# Patient Record
Sex: MALE | Race: ASIAN | NOT HISPANIC OR LATINO
[De-identification: names, ages, dates, MRNs, and addresses within clinical notes are randomized per-mention and may not be internally consistent; named-entity substitution may affect disease eponyms.]

---

## 2017-02-09 ENCOUNTER — RECORDS - HEALTHEAST (OUTPATIENT)
Dept: ADMINISTRATIVE | Facility: OTHER | Age: 22
End: 2017-02-09

## 2017-02-11 ENCOUNTER — RECORDS - HEALTHEAST (OUTPATIENT)
Dept: ADMINISTRATIVE | Facility: OTHER | Age: 22
End: 2017-02-11

## 2017-05-11 ENCOUNTER — RECORDS - HEALTHEAST (OUTPATIENT)
Dept: ADMINISTRATIVE | Facility: OTHER | Age: 22
End: 2017-05-11

## 2017-10-02 ENCOUNTER — RECORDS - HEALTHEAST (OUTPATIENT)
Dept: ADMINISTRATIVE | Facility: OTHER | Age: 22
End: 2017-10-02

## 2018-06-19 ENCOUNTER — OFFICE VISIT (OUTPATIENT)
Dept: FAMILY MEDICINE | Facility: CLINIC | Age: 23
End: 2018-06-19
Payer: COMMERCIAL

## 2018-06-19 VITALS
HEIGHT: 70 IN | OXYGEN SATURATION: 96 % | DIASTOLIC BLOOD PRESSURE: 84 MMHG | TEMPERATURE: 98.9 F | WEIGHT: 315 LBS | HEART RATE: 85 BPM | BODY MASS INDEX: 45.1 KG/M2 | SYSTOLIC BLOOD PRESSURE: 138 MMHG

## 2018-06-19 DIAGNOSIS — M10.072 ACUTE IDIOPATHIC GOUT OF LEFT ANKLE: ICD-10-CM

## 2018-06-19 DIAGNOSIS — Z11.3 SCREENING EXAMINATION FOR VENEREAL DISEASE: ICD-10-CM

## 2018-06-19 DIAGNOSIS — E66.01 MORBID OBESITY (H): ICD-10-CM

## 2018-06-19 DIAGNOSIS — M25.572 PAIN IN JOINT, ANKLE AND FOOT, LEFT: Primary | ICD-10-CM

## 2018-06-19 DIAGNOSIS — R79.82 ELEVATED C-REACTIVE PROTEIN (CRP): ICD-10-CM

## 2018-06-19 LAB
ERYTHROCYTE [SEDIMENTATION RATE] IN BLOOD BY WESTERGREN METHOD: 15 MM/H (ref 0–15)
URATE SERPL-MCNC: 10.9 MG/DL (ref 3.5–7.2)

## 2018-06-19 PROCEDURE — 86140 C-REACTIVE PROTEIN: CPT | Performed by: NURSE PRACTITIONER

## 2018-06-19 PROCEDURE — 36415 COLL VENOUS BLD VENIPUNCTURE: CPT | Performed by: NURSE PRACTITIONER

## 2018-06-19 PROCEDURE — 99214 OFFICE O/P EST MOD 30 MIN: CPT | Performed by: NURSE PRACTITIONER

## 2018-06-19 PROCEDURE — 87591 N.GONORRHOEAE DNA AMP PROB: CPT | Performed by: NURSE PRACTITIONER

## 2018-06-19 PROCEDURE — 87491 CHLMYD TRACH DNA AMP PROBE: CPT | Performed by: NURSE PRACTITIONER

## 2018-06-19 PROCEDURE — 86618 LYME DISEASE ANTIBODY: CPT | Performed by: NURSE PRACTITIONER

## 2018-06-19 PROCEDURE — 86038 ANTINUCLEAR ANTIBODIES: CPT | Performed by: NURSE PRACTITIONER

## 2018-06-19 PROCEDURE — 87389 HIV-1 AG W/HIV-1&-2 AB AG IA: CPT | Performed by: NURSE PRACTITIONER

## 2018-06-19 PROCEDURE — 85652 RBC SED RATE AUTOMATED: CPT | Performed by: NURSE PRACTITIONER

## 2018-06-19 PROCEDURE — 84550 ASSAY OF BLOOD/URIC ACID: CPT | Performed by: NURSE PRACTITIONER

## 2018-06-19 RX ORDER — HYDROCODONE BITARTRATE AND ACETAMINOPHEN 5; 325 MG/1; MG/1
1 TABLET ORAL 2 TIMES DAILY PRN
Qty: 20 TABLET | Refills: 0 | Status: SHIPPED | OUTPATIENT
Start: 2018-06-19 | End: 2021-10-01

## 2018-06-19 RX ORDER — DICLOFENAC SODIUM 75 MG/1
75 TABLET, DELAYED RELEASE ORAL 2 TIMES DAILY PRN
Qty: 30 TABLET | Refills: 1 | Status: SHIPPED | OUTPATIENT
Start: 2018-06-19 | End: 2021-10-01

## 2018-06-19 NOTE — LETTER
June 19, 2018      Saravanan Park  [address] [facility] [address]        To Whom It May Concern:    Saravanan Park  was seen on 06/19/18 due to injury.  Please excuse him  until 6/22/18 due to injury.    If you have questions or concerns please call the clinic.    Sincerely,        ESVIN Maynard, FNP-BC/mp

## 2018-06-19 NOTE — PATIENT INSTRUCTIONS
Call insurance and verify coverage of MRI, schedule as able.  Wear ankle brace when active, to prevent re-injury.  I will let you know your lab/ imaging results when I get them and can let you know plan of care if abnormalities are seen.  Follow below instructions.       Treating Ankle Sprains  Treatment will depend on how bad your sprain is. For a severe sprain, healing may take 3 months or more.  Right after your injury: Use R.I.C.E.    Rest: At first, keep weight off the ankle as much as you can. You may be given crutches to help you walk without putting weight on the ankle.    Ice: Put an ice pack on the ankle for 20 minutes. Remove the pack and wait at least 30 minutes. Repeat for up to 3 days. This helps reduce swelling.    Compression: To reduce swelling and keep the joint stable, you may need to wrap the ankle with an elastic bandage. For more severe sprains, you may need an ankle brace, a boot, or a cast.    Elevation: To reduce swelling, keep your ankle raised above your heart when you sit or lie down.  Medicine  Your healthcare provider may suggest oral nonsteroidal anti-inflammatory medicine (NSAIDs), such as ibuprofen. This relieves the pain and helps reduce swelling. Be sure to take your medicine as directed.  Exercises    After about 2 to 3 weeks, you may be given exercises to strengthen the ligaments and muscles in the ankle. Doing these exercises will help prevent another ankle sprain. Exercises may include standing on your toes and then on your heels and doing ankle curls.  Ankle curls    Sit on the edge of a sturdy table or lie on your back.    Pull your toes toward you. Then point them away from you. Repeat for 2 to 3 minutes.   Date Last Reviewed: 1/1/2018 2000-2017 [employer]. [address] [place] [address], [place] [address]. All rights reserved. This information is not intended as a substitute for professional medical care. Always follow your healthcare professional's  instructions.        Ankle Sprain (Adult)  An ankle sprain is a stretching or tearing of the ligaments that hold the ankle joint together. There are no broken bones.  An ankle sprain is a common injury for both children and adults. It happens when the ankle turns, twists, or rolls in an awkward way. This can be caused by a sports injury. Or it can happen from doing something as simple as stepping on an uneven surface.  Ligaments are made of tough connective tissue. Normally, ligaments stretch a certain amount and then go back to their normal place. A sprain happens when a ligament is forced to stretch more than the normal amount. A severe sprain can actually tear the ligaments. If you have a severe sprain, you may have felt or heard something like a pop when you were injured.  Ankle sprains are given a grade depending on whether they are mild, moderate, or severe:    Grade 1 sprain. A mild sprain with minor stretching and damage to the ligament.    Grade 2 sprain. A moderate sprain where the ligament is partly torn.    Grade 3 sprain. The most severe kind of sprain. The ligament is completely torn.  Most sprains take about 4 to 6 weeks to heal. A severe sprain can take several months to recover.  Your healthcare provider may order X-rays to be sure you don t have a fracture, or broken bone.  The injured area will feel sore.  Swelling and pain may make it hard to walk. You may need crutches if walking is painful. Or your provider may have you use a cast boot or air splint. This will depend on the grade of ankle sprain that you have.    Home care    For a Grade 1 sprain, use RICE (rest, ice, compression, and elevation):    Rest your ankle. Don t walk on it.    Ice should be used right away to help control swelling. Place an ice pack over the injured area for 20 minutes. Do this every 3 to 6 hours for the first 24 to 48 hours. Keep using ice packs to ease pain and swelling as needed. To make an ice pack, put ice cubes  in a plastic bag that seals at the top. Wrap the bag in a clean, thin towel or cloth. Never put ice or an ice pack directly on the skin. The ice pack can be put right on the cast, bandage, or splint. As the ice melts, be careful that the cast, bandage, or splint doesn t get wet. If you have a boot, open it to apply an ice pack, unless told otherwise by your provider.    Compression devices help to control swelling. They also keep the ankle from moving and support your injured ankle. These devices include dressings, bandages, and wraps.    Elevate or raise your ankle above the level of your heart when sitting or lying down. This is very important for the first 48 hours.    Follow the RICE guidelines for a Grade 2 sprain. This type of sprain will take longer to heal. Your provider may have you wear a splint, cast, or brace to keep your ankle from moving.     If you have a Grade 3 sprain, you are at risk for long-term ankle instability. In rare cases, surgery may be needed. Your provider may have you wear a short leg cast or a walking boot for 2 to 3 weeks.    After 48 hours, it may be helpful to apply heat for 20 minutes several times a day. You can do this with a heating pad or warm compress. Or you may want to go back and forth between using ice and heat. Never apply heat directly to the skin. Always wrap the heating pad or warm compress in a clean, thin towel or cloth.    You may use over-the-counter pain medicine (NSAIDS or nonsteroidal anti-inflammatory drugs) to control pain, unless another pain medicine was prescribed. Talk with your provider before using these medicines if you have chronic liver or kidney disease, or have ever had a stomach ulcer or GI (gastrointestinal) bleeding.    Follow any rehabilitation exercises your provider gives you. These can help you be more flexible and improve your balance and coordination. This is helpful in preventing long-term ankle problems.  Prevention  To help prevent ankle  sprains, it s important to have good strength, balance, and flexibility. Be sure to:    Always warm up before you exercise or do something very active    Be careful when walking or running on uneven or cracked surfaces    Wear shoes that are in good condition and fit well    Listen to your body s signals to slow down when you are in pain or tired  Follow-up care  Any X-rays you had today don t show any broken bones, breaks, or fractures. Sometimes fractures don t show up on the first X-ray. Bruises and sprains can sometimes hurt as much as a fracture. These injuries can take time to heal completely. If your symptoms don t get better or they get worse, talk with your healthcare provider. You may need a repeat X-ray.  Follow up with your healthcare provider, or as advised. Check for any warning signs listed below.  When to seek medical advice  Call your healthcare provider right away if any of these occur:    Fever of 100.4 F (38 C) or higher, or as directed by your healthcare provider    The injury doesn t seem to be healing    The swelling comes back    The cast has a bad smell    The plaster cast or splint gets wet or soft    The fiberglass cast or splint gets wet and does not dry for 24 hours    The pain or swelling increases, or redness appears    Your toes become cold, blue, numb, or tingly    The skin is discolored (looks blue, purple, or gray), has blisters, or is irritated    You re-injure your ankle  Date Last Reviewed: 11/20/2015 2000-2017 [employer]. [address] [place] [address], [place] [address]. All rights reserved. This information is not intended as a substitute for professional medical care. Always follow your healthcare professional's instructions.

## 2018-06-19 NOTE — NURSING NOTE
"Chief Complaint   Patient presents with     Establish Care     Pain       Initial BP (!) 148/100  Pulse 85  Temp 98.9  F (37.2  C) (Oral)  Ht 5' 10.47\" (1.79 m)  Wt (!) 355 lb 9.6 oz (161.3 kg)  SpO2 96%  BMI 50.34 kg/m2 Estimated body mass index is 50.34 kg/(m^2) as calculated from the following:    Height as of this encounter: 5' 10.47\" (1.79 m).    Weight as of this encounter: 355 lb 9.6 oz (161.3 kg).  Medication Reconciliation: complete     Lenore Le MA  "

## 2018-06-19 NOTE — MR AVS SNAPSHOT
After Visit Summary   6/19/2018    Saravanan Park    MRN: [id number]           Patient Information     Date Of Birth          1995        Visit Information        Provider Department      6/19/2018 2:00 PM Haleigh Nowak NP [facility]        Today's Diagnoses     Pain in joint, ankle and foot, left    -  1    Screening examination for venereal disease          Care Instructions    Call insurance and verify coverage of MRI, schedule as able.  Wear ankle brace when active, to prevent re-injury.  I will let you know your lab/ imaging results when I get them and can let you know plan of care if abnormalities are seen.  Follow below instructions.       Treating Ankle Sprains  Treatment will depend on how bad your sprain is. For a severe sprain, healing may take 3 months or more.  Right after your injury: Use R.I.C.E.    Rest: At first, keep weight off the ankle as much as you can. You may be given crutches to help you walk without putting weight on the ankle.    Ice: Put an ice pack on the ankle for 20 minutes. Remove the pack and wait at least 30 minutes. Repeat for up to 3 days. This helps reduce swelling.    Compression: To reduce swelling and keep the joint stable, you may need to wrap the ankle with an elastic bandage. For more severe sprains, you may need an ankle brace, a boot, or a cast.    Elevation: To reduce swelling, keep your ankle raised above your heart when you sit or lie down.  Medicine  Your healthcare provider may suggest oral nonsteroidal anti-inflammatory medicine (NSAIDs), such as ibuprofen. This relieves the pain and helps reduce swelling. Be sure to take your medicine as directed.  Exercises    After about 2 to 3 weeks, you may be given exercises to strengthen the ligaments and muscles in the ankle. Doing these exercises will help prevent another ankle sprain. Exercises may include standing on your toes and then on your heels and doing ankle curls.  Ankle curls    Sit  on the edge of a sturdy table or lie on your back.    Pull your toes toward you. Then point them away from you. Repeat for 2 to 3 minutes.   Date Last Reviewed: 1/1/2018 2000-2017 The "[employer] [address] [place] [address], [place] [address]. All rights reserved. This information is not intended as a substitute for professional medical care. Always follow your healthcare professional's instructions.        Ankle Sprain (Adult)  An ankle sprain is a stretching or tearing of the ligaments that hold the ankle joint together. There are no broken bones.  An ankle sprain is a common injury for both children and adults. It happens when the ankle turns, twists, or rolls in an awkward way. This can be caused by a sports injury. Or it can happen from doing something as simple as stepping on an uneven surface.  Ligaments are made of tough connective tissue. Normally, ligaments stretch a certain amount and then go back to their normal place. A sprain happens when a ligament is forced to stretch more than the normal amount. A severe sprain can actually tear the ligaments. If you have a severe sprain, you may have felt or heard something like a pop when you were injured.  Ankle sprains are given a grade depending on whether they are mild, moderate, or severe:    Grade 1 sprain. A mild sprain with minor stretching and damage to the ligament.    Grade 2 sprain. A moderate sprain where the ligament is partly torn.    Grade 3 sprain. The most severe kind of sprain. The ligament is completely torn.  Most sprains take about 4 to 6 weeks to heal. A severe sprain can take several months to recover.  Your healthcare provider may order X-rays to be sure you don t have a fracture, or broken bone.  The injured area will feel sore.  Swelling and pain may make it hard to walk. You may need crutches if walking is painful. Or your provider may have you use a cast boot or air splint. This will depend on the grade of ankle sprain that  you have.    Home care    For a Grade 1 sprain, use RICE (rest, ice, compression, and elevation):    Rest your ankle. Don t walk on it.    Ice should be used right away to help control swelling. Place an ice pack over the injured area for 20 minutes. Do this every 3 to 6 hours for the first 24 to 48 hours. Keep using ice packs to ease pain and swelling as needed. To make an ice pack, put ice cubes in a plastic bag that seals at the top. Wrap the bag in a clean, thin towel or cloth. Never put ice or an ice pack directly on the skin. The ice pack can be put right on the cast, bandage, or splint. As the ice melts, be careful that the cast, bandage, or splint doesn t get wet. If you have a boot, open it to apply an ice pack, unless told otherwise by your provider.    Compression devices help to control swelling. They also keep the ankle from moving and support your injured ankle. These devices include dressings, bandages, and wraps.    Elevate or raise your ankle above the level of your heart when sitting or lying down. This is very important for the first 48 hours.    Follow the RICE guidelines for a Grade 2 sprain. This type of sprain will take longer to heal. Your provider may have you wear a splint, cast, or brace to keep your ankle from moving.     If you have a Grade 3 sprain, you are at risk for long-term ankle instability. In rare cases, surgery may be needed. Your provider may have you wear a short leg cast or a walking boot for 2 to 3 weeks.    After 48 hours, it may be helpful to apply heat for 20 minutes several times a day. You can do this with a heating pad or warm compress. Or you may want to go back and forth between using ice and heat. Never apply heat directly to the skin. Always wrap the heating pad or warm compress in a clean, thin towel or cloth.    You may use over-the-counter pain medicine (NSAIDS or nonsteroidal anti-inflammatory drugs) to control pain, unless another pain medicine was  prescribed. Talk with your provider before using these medicines if you have chronic liver or kidney disease, or have ever had a stomach ulcer or GI (gastrointestinal) bleeding.    Follow any rehabilitation exercises your provider gives you. These can help you be more flexible and improve your balance and coordination. This is helpful in preventing long-term ankle problems.  Prevention  To help prevent ankle sprains, it s important to have good strength, balance, and flexibility. Be sure to:    Always warm up before you exercise or do something very active    Be careful when walking or running on uneven or cracked surfaces    Wear shoes that are in good condition and fit well    Listen to your body s signals to slow down when you are in pain or tired  Follow-up care  Any X-rays you had today don t show any broken bones, breaks, or fractures. Sometimes fractures don t show up on the first X-ray. Bruises and sprains can sometimes hurt as much as a fracture. These injuries can take time to heal completely. If your symptoms don t get better or they get worse, talk with your healthcare provider. You may need a repeat X-ray.  Follow up with your healthcare provider, or as advised. Check for any warning signs listed below.  When to seek medical advice  Call your healthcare provider right away if any of these occur:    Fever of 100.4 F (38 C) or higher, or as directed by your healthcare provider    The injury doesn t seem to be healing    The swelling comes back    The cast has a bad smell    The plaster cast or splint gets wet or soft    The fiberglass cast or splint gets wet and does not dry for 24 hours    The pain or swelling increases, or redness appears    Your toes become cold, blue, numb, or tingly    The skin is discolored (looks blue, purple, or gray), has blisters, or is irritated    You re-injure your ankle  Date Last Reviewed: 11/20/2015 2000-2017 [employer]. [address] [facility],  "[place] [address]. All rights reserved. This information is not intended as a substitute for professional medical care. Always follow your healthcare professional's instructions.                Follow-ups after your visit        Follow-up notes from your care team     Return if symptoms worsen or fail to improve.      Future tests that were ordered for you today     Open Future Orders        Priority Expected Expires Ordered    MR Ankle Left w/o Contrast Routine  6/19/2019 6/19/2018            Who to contact     Normal or non-critical lab and imaging results will be communicated to you by MyChart, letter or phone within 4 business days after the clinic has received the results. If you do not hear from us within 7 days, please contact the clinic through [employer] or phone. If you have a critical or abnormal lab result, we will notify you by phone as soon as possible.  Submit refill requests through [employer] or call your pharmacy and they will forward the refill request to us. Please allow 3 business days for your refill to be completed.          If you need to speak with a  for additional information , please call: [phone]             Additional Information About Your Visit        Care EveryWhere ID     This is your Care EveryWhere ID. This could be used by other organizations to access your [facility] medical records  [id number]        Your Vitals Were     Pulse Temperature Height Pulse Oximetry BMI (Body Mass Index)       85 98.9  F (37.2  C) (Oral) 5' 10.47\" (1.79 m) 96% 50.34 kg/m2        Blood Pressure from Last 3 Encounters:   06/19/18 (!) 148/100    Weight from Last 3 Encounters:   06/19/18 (!) 355 lb 9.6 oz (161.3 kg)              We Performed the Following     Anti Nuclear Christine IgG by IFA with Reflex     Chlamydia trachomatis PCR     CRP inflammation     Erythrocyte sedimentation rate auto     HIV Antigen Antibody Combo     Lyme Disease Christine with reflex to WB Serum     Neisseria gonorrhoeae PCR     " Uric acid          Today's Medication Changes          These changes are accurate as of 6/19/18  2:11 PM.  If you have any questions, ask your nurse or doctor.               Start taking these medicines.        Dose/Directions    diclofenac 75 MG EC tablet   Commonly known as:  VOLTAREN   Used for:  Pain in joint, ankle and foot, left   Started by:  Haleigh Nowak NP        Dose:  75 mg   Take 1 tablet (75 mg) by mouth 2 times daily as needed for moderate pain   Quantity:  30 tablet   Refills:  1       HYDROcodone-acetaminophen 5-325 MG per tablet   Commonly known as:  NORCO   Used for:  Pain in joint, ankle and foot, left   Started by:  Haleigh Nowak NP        Dose:  1 tablet   Take 1 tablet by mouth 2 times daily as needed for moderate to severe pain (use sparingly, can be habit forming.)   Quantity:  20 tablet   Refills:  0            Where to get your medicines      These medications were sent to [facility] - [address] [facility]  [address] [facility] [place] [address]     Phone:  [phone]     diclofenac 75 MG EC tablet         Some of these will need a paper prescription and others can be bought over the counter.  Ask your nurse if you have questions.     Bring a paper prescription for each of these medications     HYDROcodone-acetaminophen 5-325 MG per tablet               Information about OPIOIDS     PRESCRIPTION OPIOIDS: WHAT YOU NEED TO KNOW   We gave you an opioid (narcotic) pain medicine. It is important to manage your pain, but opioids are not always the best choice. You should first try all the other options your care team gave you. Take this medicine for as short a time (and as few doses) as possible.     These medicines have risks:    DO NOT drive when on new or higher doses of pain medicine. These medicines can affect your alertness and reaction times, and you could be arrested for driving under the influence (DUI). If you need to use opioids long-term, talk  to your care team about driving.    DO NOT operate heave machinery    DO NOT do any other dangerous activities while taking these medicines.     DO NOT drink any alcohol while taking these medicines.      If the opioid prescribed includes acetaminophen, DO NOT take with any other medicines that contain acetaminophen. Read all labels carefully. Look for the word  acetaminophen  or  Tylenol.  Ask your pharmacist if you have questions or are unsure.    You can get addicted to pain medicines, especially if you have a history of addiction (chemical, alcohol or substance dependence). Talk to your care team about ways to reduce this risk.    Store your pills in a secure place, locked if possible. We will not replace any lost or stolen medicine. If you don t finish your medicine, please throw away (dispose) as directed by your pharmacist. The [place] Pollution Control Agency has more information about safe disposal: https://www.pca.[facility].[place]/living-green/managing-unwanted-medications.     All opioids tend to cause constipation. Drink plenty of water and eat foods that have a lot of fiber, such as fruits, vegetables, prune juice, apple juice and high-fiber cereal. Take a laxative (Miralax, milk of magnesia, Colace, Senna) if you don t move your bowels at least every other day.          Primary Care Provider Office Phone # Fax #    Haleigh Nowak [phone] [address] [facility] [address]        Equal Access to Services     ZULLY CURRAN : Hadii dalia turner hadasho Soomaali, waaxda luqadaha, qaybta kaalmada deep, kash bobby. So [facility] [phone].    ATENCIÓN: Si habla español, tiene a quesada disposición servicios gratuitos de asistencia lingüística. Eunice al [phone].    We comply with applicable federal civil rights laws and [place] laws. We do not discriminate on the basis of race, color, national origin, age, disability, sex, sexual orientation, or gender  identity.            Thank you!     Thank you for choosing [facility]  for your care. Our goal is always to provide you with excellent care. Hearing back from our patients is one way we can continue to improve our services. Please take a few minutes to complete the written survey that you may receive in the mail after your visit with us. Thank you!             Your Updated Medication List - Protect others around you: Learn how to safely use, store and throw away your medicines at www.disposemymeds.org.          This list is accurate as of 6/19/18  2:11 PM.  Always use your most recent med list.                   Brand Name Dispense Instructions for use Diagnosis    diclofenac 75 MG EC tablet    VOLTAREN    30 tablet    Take 1 tablet (75 mg) by mouth 2 times daily as needed for moderate pain    Pain in joint, ankle and foot, left       HYDROcodone-acetaminophen 5-325 MG per tablet    NORCO    20 tablet    Take 1 tablet by mouth 2 times daily as needed for moderate to severe pain (use sparingly, can be habit forming.)    Pain in joint, ankle and foot, left

## 2018-06-19 NOTE — LETTER
June 22, 2018      Saravanan Park  [address] [address]        Dear ,    Your uric acid level is elevated, if still having pain, I am sending in a course of prednisone. Since CRP inflammation is also high, I am placing a referral for rheumatology for further evaluation.        Resulted Orders   Chlamydia trachomatis PCR   Result Value Ref Range    Specimen Description Urine     Chlamydia Trachomatis PCR Negative NEG^Negative      Comment:      Negative for C. trachomatis rRNA by transcription mediated amplification.  A negative result by transcription mediated amplification does not preclude   the presence of C. trachomatis infection because results are dependent on   proper and adequate collection, absence of inhibitors, and sufficient rRNA to   be detected.     Neisseria gonorrhoeae PCR   Result Value Ref Range    Specimen Descrip Urine     N Gonorrhea PCR Negative NEG^Negative      Comment:      Negative for N. gonorrhoeae rRNA by transcription mediated amplification.  A negative result by transcription mediated amplification does not preclude   the presence of N. gonorrhoeae infection because results are dependent on   proper and adequate collection, absence of inhibitors, and sufficient rRNA to   be detected.     HIV Antigen Antibody Combo   Result Value Ref Range    HIV Antigen Antibody Combo Nonreactive NR^Nonreactive          Comment:      HIV-1 p24 Ag & HIV-1/HIV-2 Ab Not Detected   Uric acid   Result Value Ref Range    Uric Acid 10.9 (H) 3.5 - 7.2 mg/dL   Anti Nuclear Christine IgG by IFA with Reflex   Result Value Ref Range    SERGIO interpretation Negative NEG^Negative      Comment:                                         Reference range:  <1:40  NEGATIVE  1:40 - 1:80  BORDERLINE POSITIVE  >1:80 POSITIVE     Erythrocyte sedimentation rate auto   Result Value Ref Range    Sed Rate 15 0 - 15 mm/h   Lyme Disease Christine with reflex to WB Serum   Result Value Ref Range    Lyme Disease Antibodies  Serum 0.09 0.00 - 0.89      Comment:      Negative, Absence of detectable Borrelia burdorferi antibodies. A negative   result does not exclude the possibility of Borrelia burgdorferi infection. If   early Lyme disease is suspected, a second sample should be collected and   tested 2 to 4 weeks later.     CRP inflammation   Result Value Ref Range    CRP Inflammation 22.7 (H) 0.0 - 8.0 mg/L       If you have any questions or concerns, please call the clinic at the number listed above.       Sincerely,    Haleigh Nowak NP/edward

## 2018-06-19 NOTE — PROGRESS NOTES
SUBJECTIVE:   Saravanan Park is a 23 year old male who presents to clinic today for the following health issues:      New Patient/Transfer of Care    Muscle/Joint Pain     Onset: yesterday    Description:   Location: left ankle  Character: Sharp    Progression of Symptoms: worse; has injurted his ankle several times in the past.  Had xray at [facility] recently- no injury seen. Would like MRI. Pain 7/10 at rest, 8/10 with walking.     Accompanying Signs & Symptoms:  Other symptoms: weakness of ankle and swelling    Precipitating factors:   Trauma or overuse: YES- played sports last night    Alleviating factors:  Improved by: nothing  Therapies Tried and outcome: ibu       Problem list and histories reviewed & adjusted, as indicated.  Additional history: as documented    There is no problem list on file for this patient.    History reviewed. No pertinent surgical history.    Social History   Substance Use Topics     Smoking status: Never Smoker     Smokeless tobacco: Never Used     Alcohol use Yes      Comment: occ     Family History   Problem Relation Age of Onset     Diabetes Mother      Hypertension Father          Current Outpatient Prescriptions   Medication Sig Dispense Refill     diclofenac (VOLTAREN) 75 MG EC tablet Take 1 tablet (75 mg) by mouth 2 times daily as needed for moderate pain 30 tablet 1     HYDROcodone-acetaminophen (NORCO) 5-325 MG per tablet Take 1 tablet by mouth 2 times daily as needed for moderate to severe pain (use sparingly, can be habit forming.) 20 tablet 0     No Known Allergies  BP Readings from Last 3 Encounters:   06/19/18 138/84    Wt Readings from Last 3 Encounters:   06/19/18 (!) 355 lb 9.6 oz (161.3 kg)                  Labs reviewed in EPIC    Reviewed and updated as needed this visit by clinical staff  Tobacco  Allergies  Meds  Med Hx  Surg Hx  Fam Hx  Soc Hx      Reviewed and updated as needed this visit by Provider         ROS:  Constitutional, HEENT,  "cardiovascular, pulmonary, GI, , musculoskeletal, neuro, skin, endocrine and psych systems are negative, except as otherwise noted.    OBJECTIVE:     /84  Pulse 85  Temp 98.9  F (37.2  C) (Oral)  Ht 5' 10.47\" (1.79 m)  Wt (!) 355 lb 9.6 oz (161.3 kg)  SpO2 96%  BMI 50.34 kg/m2  Body mass index is 50.34 kg/(m^2).  GENERAL: healthy, alert and no distress  RESP: lungs clear to auscultation - no rales, rhonchi or wheezes  CV: regular rate and rhythm, normal S1 S2, no S3 or S4, no murmur, click or rub, no peripheral edema and peripheral pulses strong  MS: no gross musculoskeletal defects noted POSITIVE for left ankle edema, pain with palpation of top of foot/ bilateral sides of ankle.  ROM significantly decreased in all directions due to swelling and pain.   SKIN: no suspicious discoloration  PSYCH: mentation appears normal, affect normal/bright    Diagnostic Test Results:  See orders    ASSESSMENT/PLAN:           ICD-10-CM    1. Pain in joint, ankle and foot, left M25.572 MR Ankle Left w/o Contrast     Uric acid     Anti Nuclear Christine IgG by IFA with Reflex     Erythrocyte sedimentation rate auto     Lyme Disease Christine with reflex to WB Serum     CRP inflammation     diclofenac (VOLTAREN) 75 MG EC tablet     HYDROcodone-acetaminophen (NORCO) 5-325 MG per tablet    recurrent   2. Screening examination for venereal disease Z11.3 Chlamydia trachomatis PCR     Neisseria gonorrhoeae PCR     HIV Antigen Antibody Combo       See Patient Instructions: [place]  checked. Call insurance and verify coverage of MRI, schedule as able.  Wear ankle brace when active, to prevent re-injury.  I will let you know your lab/ imaging results when I get them and can let you know plan of care if abnormalities are seen.  Follow below instructions.     Haleigh Nowak, SAMANTHA  [facility] JONNY  "

## 2018-06-20 LAB
ANA SER QL IF: NEGATIVE
B BURGDOR IGG+IGM SER QL: 0.09 (ref 0–0.89)
C TRACH DNA SPEC QL NAA+PROBE: NEGATIVE
CRP SERPL-MCNC: 22.7 MG/L (ref 0–8)
HIV 1+2 AB+HIV1 P24 AG SERPL QL IA: NONREACTIVE
N GONORRHOEA DNA SPEC QL NAA+PROBE: NEGATIVE
SPECIMEN SOURCE: NORMAL
SPECIMEN SOURCE: NORMAL

## 2018-06-21 ENCOUNTER — TELEPHONE (OUTPATIENT)
Dept: FAMILY MEDICINE | Facility: CLINIC | Age: 23
End: 2018-06-21

## 2018-06-21 DIAGNOSIS — M25.572 PAIN IN JOINT INVOLVING ANKLE AND FOOT, LEFT: Primary | ICD-10-CM

## 2018-06-21 PROBLEM — E66.01 MORBID OBESITY (H): Status: ACTIVE | Noted: 2018-06-21

## 2018-06-21 RX ORDER — PREDNISONE 20 MG/1
TABLET ORAL
Qty: 20 TABLET | Refills: 0 | Status: SHIPPED | OUTPATIENT
Start: 2018-06-21 | End: 2021-10-01

## 2018-06-21 NOTE — TELEPHONE ENCOUNTER
Patient would like an order placed for [facility]-he thinks he can get in there sooner than [facility]. He wants to get in this week still.

## 2018-06-21 NOTE — PROGRESS NOTES
Please call pt, uric acid level is elevated, if still having pain, I am sending in a course of prednisone. Since CRP inflammation is also high, I am placing a referral for rheumatology for further evaluation.    SNEHAL Lynn

## 2018-12-11 ENCOUNTER — OFFICE VISIT (OUTPATIENT)
Dept: ORTHOPEDICS | Facility: CLINIC | Age: 23
End: 2018-12-11
Payer: COMMERCIAL

## 2018-12-11 ENCOUNTER — ANCILLARY PROCEDURE (OUTPATIENT)
Dept: GENERAL RADIOLOGY | Facility: CLINIC | Age: 23
End: 2018-12-11
Attending: PEDIATRICS
Payer: COMMERCIAL

## 2018-12-11 VITALS
DIASTOLIC BLOOD PRESSURE: 88 MMHG | HEIGHT: 71 IN | BODY MASS INDEX: 44.1 KG/M2 | WEIGHT: 315 LBS | SYSTOLIC BLOOD PRESSURE: 136 MMHG

## 2018-12-11 DIAGNOSIS — M25.571 RIGHT ANKLE PAIN: ICD-10-CM

## 2018-12-11 DIAGNOSIS — M25.571 RIGHT ANKLE PAIN, UNSPECIFIED CHRONICITY: Primary | ICD-10-CM

## 2018-12-11 PROCEDURE — 99213 OFFICE O/P EST LOW 20 MIN: CPT | Performed by: PEDIATRICS

## 2018-12-11 PROCEDURE — 73610 X-RAY EXAM OF ANKLE: CPT | Mod: RT

## 2018-12-11 ASSESSMENT — MIFFLIN-ST. JEOR: SCORE: 2614.46

## 2018-12-11 NOTE — PATIENT INSTRUCTIONS
Ice and elevate    Activity Modification: as discussed, avoid painful activities     Brace/compress for soreness.     Physical Therapy referral placed    Letter written for work    Take Diclofenac (Voltaren) OR ibuprofen (Advil), not both together. You can take acetaminophen (Tylenol) with either    Prescription dosages are as follow:  -Acetaminophen (Tylenol) 1000mg every 6 hours with food (Maximum of 3000mg/day)  -Ibuprofen (Advil) maximum of 800mg three times a day with food

## 2018-12-11 NOTE — LETTER
12/11/2018         RE: Saravanan Park  [address]  [place] [address]        Dear Colleague,    Thank you for referring your patient, Saravanan Park, to the [facility] [place]. Please see a copy of my visit note below.    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  Patient states he has sprained his ankle a few times, most recently over the summer.  No recent injury, but states it has never been the same.  Pain over the lateral aspect of his ankle.  Does have pain in his Achilles as well.   Has had an increase in pain over the past few days   Currently ambulating with one crutch.  Has a wrap around ankle brace     **  Right anterolateral ankle pain. Denies any injuries or exacerbating factors that could cause pain. Helped by ibuprofen. No pain currently with recent ibuprofen dosage. Positive for swelling. Positive for reduced ROM. Positive for antalgic gait. Positive for calf wil. Denies popping, cracking, swelling.        Injury: HX of inversion ankle sprains     Location of Pain: right lateral ankle   Duration of Pain: 3 day(s) of increasing pain  Rating of Pain at worst: 8/10  Rating of Pain Currently: 4/10  Symptoms are better with: Ibuprofen  Symptoms are worse with: weight bearing   Additional Features:   Positive: swelling and instability   Negative: bruising, popping, grinding, catching, locking, paresthesias, numbness, weakness, pain in other joints and systemic symptoms  Other evaluation and/or treatments so far consists of: Nothing  Prior History of related problems: repeated ankle sprains     Social History:      Review of Systems  Musculoskeletal: as above  Remainder of review of systems is negative including constitutional, CV, pulmonary, GI, Skin and Neurologic except as noted in HPI or medical history.    This document serves as a record  "of the services and decisions personally performed and made by DO SHENA Valdez. It was created on their behalf by Pk Reed, a trained medical scribe. The creation of this document is based the provider's statements to the medical scribe.    Pk Reed December 11, 2018 2:33 PM     Patient Active Problem List   Diagnosis     Morbid obesity (H)     PMHx: above  PSHx: noncontributory    Family History   Problem Relation Age of Onset     Diabetes Mother      Hypertension Father      Social History     Socioeconomic History     Marital status: Single     Spouse name: Not on file     Number of children: Not on file     Years of education: Not on file     Highest education level: Not on file   Social Needs     Financial resource strain: Not on file     Food insecurity - worry: Not on file     Food insecurity - inability: Not on file     Transportation needs - medical: Not on file     Transportation needs - non-medical: Not on file   Occupational History     Not on file   Tobacco Use     Smoking status: Never Smoker     Smokeless tobacco: Never Used   Substance and Sexual Activity     Alcohol use: Yes     Comment: occ     Drug use: No     Sexual activity: Yes     Partners: Female     Birth control/protection: Condom   Other Topics Concern     Parent/sibling w/ CABG, MI or angioplasty before 65F 55M? Not Asked   Social History Narrative     Not on file       Objective  /88   Ht 1.791 m (5' 10.5\")   Wt (!) 161 kg (355 lb)   BMI 50.22 kg/m       GENERAL APPEARANCE: healthy, alert, no distress and obese   GAIT: antalgic and crutches  SKIN: no suspicious lesions or rashes  NEURO: Normal strength and tone, mentation intact and speech normal  PSYCH:  mentation appears normal and affect normal/bright  HEENT: no scleral icterus  CV: no extremity edema  RESP: nonlabored breathing     Right Ankle Exam:    Inspection:       no visible ecchymosis       Diffuse mild swelling lateral ankle, into forefoot    Foot " inspection:       no deformity noted    ROM:        Dorsiflexion limited with achilles pain, mild limitation       Plantarflexion limited with achilles pain and lateral pain, mild limitation       eversion mild limitation with anterolateral ankle pain       inversion moderately limited with anterolateral ankle pain.        digit motion grossly full       Tender:       Inferior to medial malleolus        Lateral ligaments       5th metatarsal    Non-Tender:       remainder of foot and ankle    Skin:       well perfused       capillary refill brisk    Strength:       Able to resist with Dorsiflexion, Plantarflexion, eversion, and inversion  Some lateral pain with resisted PF and DF    Special Tests:       neg (-) anterior drawer        positive (+) talar tilt for lateral pain. No laxity felt    Gait:       antalgic gait       wearing ankle brace       using assitive device    Sensation:  Reduced on medial midfoot and forefoot to light touch, though still able to feel      Radiology:  Visualized radiographs of right foot taken on 12/11/2018, and reviewed the images with the patient.  Impression: no acute abnormality. Posterior calcaneal spur developing. Also with question posterior subtalar joint mild degenerative change. No fracture.      Assessment:  1. Right ankle pain, unspecified chronicity        Plan:  Discussed the assessment with the patient. Primarily lateral pain, with past sprains. Also obese.  Radiologic images reviewed and discussed with patient today   We discussed the following: symptom treatment, activity modification/rest, imaging, rehab and support for the affected area.     Following discussion, plan:   Topical Treatments: Ice or Heat as needed ; icing preferred for swelling, soreness  Over the counter medication: Patient's preferred OTC medication as needed. Has NSAIDs.  Has diclofenac as well. No additional NSAIDs with diclofenac (has rx from this past summer).  Activity Modification: as  discussed  Rehab: Physical Therapy: Referral placed. Plan PT to work on ankle issues.  May continue with compression and bracing. Has options available to him already, currently using compression.   Ok to use crutch(es) prn. Has single crutch today.  Letter written regarding work restrictions. He reports being off yesterday, is off today. Desires to be off for remainder of week. 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  Future consideration for MRI of the right ankle pending above course. No additional imaging required currently. He had discussed imaging previously at prior appointment.  Follow up: if symptoms worsen or fail to improve  Questions answered. The patient indicates understanding of these issues and agrees with the plan.     Messi Calabrese DO, SHENA          Disclaimer: This note consists of symbols derived from keyboarding, dictation and/or voice recognition software. As a result, there may be errors in the script that have gone undetected. Please consider this when interpreting information found in this chart.     The information in this document, created by the medical scribe for me, accurately reflects the services I personally performed and the decisions made by me. I have reviewed and approved this document for accuracy prior to leaving the patient care area.         Again, thank you for allowing me to participate in the care of your patient.        Sincerely,        Messi Calabrese DO

## 2018-12-11 NOTE — PROGRESS NOTES
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  Patient states he has sprained his ankle a few times, most recently over the summer.  No recent injury, but states it has never been the same.  Pain over the lateral aspect of his ankle.  Does have pain in his Achilles as well.   Has had an increase in pain over the past few days   Currently ambulating with one crutch.  Has a wrap around ankle brace     **  Right anterolateral ankle pain. Denies any injuries or exacerbating factors that could cause pain. Helped by ibuprofen. No pain currently with recent ibuprofen dosage. Positive for swelling. Positive for reduced ROM. Positive for antalgic gait. Positive for calf wil. Denies popping, cracking, swelling.        Injury: HX of inversion ankle sprains     Location of Pain: right lateral ankle   Duration of Pain: 3 day(s) of increasing pain  Rating of Pain at worst: 8/10  Rating of Pain Currently: 4/10  Symptoms are better with: Ibuprofen  Symptoms are worse with: weight bearing   Additional Features:   Positive: swelling and instability   Negative: bruising, popping, grinding, catching, locking, paresthesias, numbness, weakness, pain in other joints and systemic symptoms  Other evaluation and/or treatments so far consists of: Nothing  Prior History of related problems: repeated ankle sprains     Social History:      Review of Systems  Musculoskeletal: as above  Remainder of review of systems is negative including constitutional, CV, pulmonary, GI, Skin and Neurologic except as noted in HPI or medical history.    This document serves as a record of the services and decisions personally performed and made by DO SHENA Valdez. It was created on their behalf by Pk Reed, a trained medical scribe. The creation of this document is based the provider's statements to the medical  "radha Whittington Ashleyginny December 11, 2018 2:33 PM     Patient Active Problem List   Diagnosis     Morbid obesity (H)     PMHx: above  PSHx: noncontributory    Family History   Problem Relation Age of Onset     Diabetes Mother      Hypertension Father      Social History     Socioeconomic History     Marital status: Single     Spouse name: Not on file     Number of children: Not on file     Years of education: Not on file     Highest education level: Not on file   Social Needs     Financial resource strain: Not on file     Food insecurity - worry: Not on file     Food insecurity - inability: Not on file     Transportation needs - medical: Not on file     Transportation needs - non-medical: Not on file   Occupational History     Not on file   Tobacco Use     Smoking status: Never Smoker     Smokeless tobacco: Never Used   Substance and Sexual Activity     Alcohol use: Yes     Comment: occ     Drug use: No     Sexual activity: Yes     Partners: Female     Birth control/protection: Condom   Other Topics Concern     Parent/sibling w/ CABG, MI or angioplasty before 65F 55M? Not Asked   Social History Narrative     Not on file       Objective  /88   Ht 1.791 m (5' 10.5\")   Wt (!) 161 kg (355 lb)   BMI 50.22 kg/m      GENERAL APPEARANCE: healthy, alert, no distress and obese   GAIT: antalgic and crutches  SKIN: no suspicious lesions or rashes  NEURO: Normal strength and tone, mentation intact and speech normal  PSYCH:  mentation appears normal and affect normal/bright  HEENT: no scleral icterus  CV: no extremity edema  RESP: nonlabored breathing     Right Ankle Exam:    Inspection:       no visible ecchymosis       Diffuse mild swelling lateral ankle, into forefoot    Foot inspection:       no deformity noted    ROM:        Dorsiflexion limited with achilles pain, mild limitation       Plantarflexion limited with achilles pain and lateral pain, mild limitation       eversion mild limitation with anterolateral ankle " pain       inversion moderately limited with anterolateral ankle pain.        digit motion grossly full       Tender:       Inferior to medial malleolus        Lateral ligaments       5th metatarsal    Non-Tender:       remainder of foot and ankle    Skin:       well perfused       capillary refill brisk    Strength:       Able to resist with Dorsiflexion, Plantarflexion, eversion, and inversion  Some lateral pain with resisted PF and DF    Special Tests:       neg (-) anterior drawer        positive (+) talar tilt for lateral pain. No laxity felt    Gait:       antalgic gait       wearing ankle brace       using assitive device    Sensation:  Reduced on medial midfoot and forefoot to light touch, though still able to feel      Radiology:  Visualized radiographs of right foot taken on 12/11/2018, and reviewed the images with the patient.  Impression: no acute abnormality. Posterior calcaneal spur developing. Also with question posterior subtalar joint mild degenerative change. No fracture.      Assessment:  1. Right ankle pain, unspecified chronicity        Plan:  Discussed the assessment with the patient. Primarily lateral pain, with past sprains. Also obese.  Radiologic images reviewed and discussed with patient today   We discussed the following: symptom treatment, activity modification/rest, imaging, rehab and support for the affected area.     Following discussion, plan:   Topical Treatments: Ice or Heat as needed ; icing preferred for swelling, soreness  Over the counter medication: Patient's preferred OTC medication as needed. Has NSAIDs.  Has diclofenac as well. No additional NSAIDs with diclofenac (has rx from this past summer).  Activity Modification: as discussed  Rehab: Physical Therapy: Referral placed. Plan PT to work on ankle issues.  May continue with compression and bracing. Has options available to him already, currently using compression.   Ok to use crutch(es) prn. Has single crutch  today.  Letter written regarding work restrictions. He reports being off yesterday, is off today. Desires to be off for remainder of week. 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  Future consideration for MRI of the right ankle pending above course. No additional imaging required currently. He had discussed imaging previously at prior appointment.  Follow up: if symptoms worsen or fail to improve  Questions answered. The patient indicates understanding of these issues and agrees with the plan.     Messi Calabrese, DO, CAQ          Disclaimer: This note consists of symbols derived from keyboarding, dictation and/or voice recognition software. As a result, there may be errors in the script that have gone undetected. Please consider this when interpreting information found in this chart.     The information in this document, created by the medical scribe for me, accurately reflects the services I personally performed and the decisions made by me. I have reviewed and approved this document for accuracy prior to leaving the patient care area.

## 2018-12-11 NOTE — LETTER
[facility] [place]  [address]  [place] [address]  Phone: [phone]  Fax: [phone]      December 11, 2018      RE: Saravanan Park  [address]  [place] [address]        To whom it may concern:    Saravanan Park was seen in clinic today for a musculoskeletal issue. He reports being absent from work yesterday (12/10/18) due to this same issue. He will remain off work through Friday, 12/14/18.      Sincerely,            Messi ESCOTO

## 2019-07-30 ENCOUNTER — OFFICE VISIT (OUTPATIENT)
Dept: ORTHOPEDICS | Facility: CLINIC | Age: 24
End: 2019-07-30
Payer: COMMERCIAL

## 2019-07-30 VITALS
HEIGHT: 71 IN | WEIGHT: 315 LBS | DIASTOLIC BLOOD PRESSURE: 84 MMHG | SYSTOLIC BLOOD PRESSURE: 118 MMHG | BODY MASS INDEX: 44.1 KG/M2

## 2019-07-30 DIAGNOSIS — M25.571 CHRONIC PAIN OF RIGHT ANKLE: Primary | ICD-10-CM

## 2019-07-30 DIAGNOSIS — G89.29 CHRONIC PAIN OF RIGHT ANKLE: Primary | ICD-10-CM

## 2019-07-30 PROCEDURE — 99214 OFFICE O/P EST MOD 30 MIN: CPT | Performed by: FAMILY MEDICINE

## 2019-07-30 ASSESSMENT — MIFFLIN-ST. JEOR: SCORE: 2684.3

## 2019-07-30 NOTE — PROGRESS NOTES
ASSESSMENT & PLAN  Saravanan was seen today for pain.    Diagnoses and all orders for this visit:    Chronic pain of right ankle  -     MR Ankle Right w/o Contrast; Future      Patient is a 24 year old male presenting for evaluation of   Chief Complaint   Patient presents with     Right Ankle - Pain     Chronic Right Ankle Pain: Notable over the medial ankle with pos too many toes sign.  Previous XR on 12/18 negative.  US showing significant fluid over posterior tibialis tendon concern for PTTI vs tear.  Given chronicity and sig pain plan to order MRI and f/u afterwards  Treatment: Ankle MRI,  Ankle brace, work letter written excusing 2 days  Physical Therapy none  Injection none  Medications  Limited NSAIDs/Tylenol    Concerning signs/sx that would warrant urgent evaluation were discussed.  All questions were answered, patient understands and agrees with plan.      Return in about 1 week (around 8/6/2019).      -----    SUBJECTIVE  Saravanan Park is a/an 24 year old male who is seen as a self referral AIC for evaluation of right ankle pain. The patient is seen by themselves.    Onset: 2 month(s) ago. Reports insidious onset without acute precipitating event. Works in medical assembly   Location of Pain: right ankle-diffuse   Rating of Pain at worst: 10/10  Rating of Pain Currently: 6/10  Worsened by: walking   Better with: rest, sitting   Treatments tried: ibuprofen, brace   Associated symptoms: swelling  Orthopedic history: YES -saw Dr. Calabrese 12/11/18, self limiting pain for years   Relevant surgical history: NO  History reviewed. No pertinent past medical history.  Social History     Socioeconomic History     Marital status: Single     Spouse name: None     Number of children: None     Years of education: None     Highest education level: None   Occupational History     None   Social Needs     Financial resource strain: None     Food insecurity:     Worry: None     Inability: None     Transportation needs:      "Medical: None     Non-medical: None   Tobacco Use     Smoking status: Never Smoker     Smokeless tobacco: Never Used   Substance and Sexual Activity     Alcohol use: Yes     Comment: occ     Drug use: No     Sexual activity: Yes     Partners: Female     Birth control/protection: Condom   Lifestyle     Physical activity:     Days per week: None     Minutes per session: None     Stress: None   Relationships     Social connections:     Talks on phone: None     Gets together: None     Attends Roman Catholic service: None     Active member of club or organization: None     Attends meetings of clubs or organizations: None     Relationship status: None     Intimate partner violence:     Fear of current or ex partner: None     Emotionally abused: None     Physically abused: None     Forced sexual activity: None   Other Topics Concern     Parent/sibling w/ CABG, MI or angioplasty before 65F 55M? Not Asked   Social History Narrative     None         Patient's past medical, surgical, social, and family histories were reviewed today and no changes are noted.    REVIEW OF SYSTEMS:  10 point ROS is negative other than symptoms noted above in HPI, Past Medical History or as stated below  Constitutional: NEGATIVE for fever, chills, change in weight  Skin: NEGATIVE for worrisome rashes, moles or lesions  GI/: NEGATIVE for bowel or bladder changes  Neuro: NEGATIVE for weakness, dizziness or paresthesias    OBJECTIVE:  /84   Ht 1.791 m (5' 10.5\")   Wt (!) 168.5 kg (371 lb 8 oz)   BMI 52.55 kg/m     General: healthy, alert and in no distress  HEENT: no scleral icterus or conjunctival erythema  Skin: no suspicious lesions or rash. No jaundice.  CV:  no pedal edema  Resp: normal respiratory effort without conversational dyspnea   Psych: normal mood and affect  Gait: normal steady gait with appropriate coordination and balance  Neuro: Normal light sensory exam of lower extremity  MSK:  RIGHT ANKLE  Inspection:  Mild medial ankle " swelling, too many toes sign  Palpation:    Tender about the posterior tibial tendon. Remainder of bony and ligamentous landmarks are nontender.  Range of Motion:     Plantarflexion limited slightly by pain / dorsiflexion full / inversion full / eversion full  Strength:    limited slightly by pain with plantar flexion, otherwise 5/5  Special Tests:    negative anterior drawer, negative talar tilt, negative valgus stress, negative forced external rotation/eversion, negative Boo sign, negative squeeze test. Unable to perform heel raise and Unable to hop.    RIGHT FOOT  Inspection:    no swelling or ecchymosis  Palpation:  Non-tender.  Range of Motion:     Grossly intact and non-painful  Strength:    Grossly intact in all planes  Special Tests:    Positive: None    Negative: anterior drawer, heel strike and calcaneal squeeze    Independent visualization of the below image:  No results found for this or any previous visit (from the past 24 hour(s)).  RIGHT ANKLE THREE OR MORE VIEWS   12/11/2018 2:21 PM      HISTORY: Right ankle pain.                                                                    IMPRESSION: Unremarkable exam.     TRIXIE ADKINS MD    Patient's conditions were thoroughly discussed during today's visit with greater than 50% of the visit spent counseling the patient with total time spent face-to-face with the patient being 30 minutes.    Mukul Castañeda MD [facility]

## 2019-07-30 NOTE — LETTER
7/30/2019         RE: Saravanan Park  [address]  [place] [address]        Dear Colleague,    Thank you for referring your patient, Saravanan Park, to the [facility] [place]. Please see a copy of my visit note below.    ASSESSMENT & PLAN  Saravanan was seen today for pain.    Diagnoses and all orders for this visit:    Chronic pain of right ankle  -     MR Ankle Right w/o Contrast; Future      Patient is a 24 year old male presenting for evaluation of   Chief Complaint   Patient presents with     Right Ankle - Pain     Chronic Right Ankle Pain: Notable over the medial ankle with pos too many toes sign.  Previous XR on 12/18 negative.  US showing significant fluid over posterior tibialis tendon concern for PTTI vs tear.  Given chronicity and sig pain plan to order MRI and f/u afterwards  Treatment: Ankle MRI,  Ankle brace, work letter written excusing 2 days  Physical Therapy none  Injection none  Medications  Limited NSAIDs/Tylenol    Concerning signs/sx that would warrant urgent evaluation were discussed.  All questions were answered, patient understands and agrees with plan.      Return in about 1 week (around 8/6/2019).      -----    SUBJECTIVE  Saravanan Park is a/an 24 year old male who is seen as a self referral AIC for evaluation of right ankle pain. The patient is seen by themselves.    Onset: 2 month(s) ago. Reports insidious onset without acute precipitating event. Works in medical assembly   Location of Pain: right ankle-diffuse   Rating of Pain at worst: 10/10  Rating of Pain Currently: 6/10  Worsened by: walking   Better with: rest, sitting   Treatments tried: ibuprofen, brace   Associated symptoms: swelling  Orthopedic history: YES -saw Dr. Calabrese 12/11/18, self limiting pain for years   Relevant surgical history: NO  History reviewed. No pertinent past medical history.  Social History     Socioeconomic History     Marital status: Single     Spouse name: None     Number of  "children: None     Years of education: None     Highest education level: None   Occupational History     None   Social Needs     Financial resource strain: None     Food insecurity:     Worry: None     Inability: None     Transportation needs:     Medical: None     Non-medical: None   Tobacco Use     Smoking status: Never Smoker     Smokeless tobacco: Never Used   Substance and Sexual Activity     Alcohol use: Yes     Comment: occ     Drug use: No     Sexual activity: Yes     Partners: Female     Birth control/protection: Condom   Lifestyle     Physical activity:     Days per week: None     Minutes per session: None     Stress: None   Relationships     Social connections:     Talks on phone: None     Gets together: None     Attends Anabaptist service: None     Active member of club or organization: None     Attends meetings of clubs or organizations: None     Relationship status: None     Intimate partner violence:     Fear of current or ex partner: None     Emotionally abused: None     Physically abused: None     Forced sexual activity: None   Other Topics Concern     Parent/sibling w/ CABG, MI or angioplasty before 65F 55M? Not Asked   Social History Narrative     None         Patient's past medical, surgical, social, and family histories were reviewed today and no changes are noted.    REVIEW OF SYSTEMS:  10 point ROS is negative other than symptoms noted above in HPI, Past Medical History or as stated below  Constitutional: NEGATIVE for fever, chills, change in weight  Skin: NEGATIVE for worrisome rashes, moles or lesions  GI/: NEGATIVE for bowel or bladder changes  Neuro: NEGATIVE for weakness, dizziness or paresthesias    OBJECTIVE:  /84   Ht 1.791 m (5' 10.5\")   Wt (!) 168.5 kg (371 lb 8 oz)   BMI 52.55 kg/m      General: healthy, alert and in no distress  HEENT: no scleral icterus or conjunctival erythema  Skin: no suspicious lesions or rash. No jaundice.  CV:  no pedal edema  Resp: normal " respiratory effort without conversational dyspnea   Psych: normal mood and affect  Gait: normal steady gait with appropriate coordination and balance  Neuro: Normal light sensory exam of lower extremity  MSK:  RIGHT ANKLE  Inspection:  Mild medial ankle swelling, too many toes sign  Palpation:    Tender about the posterior tibial tendon. Remainder of bony and ligamentous landmarks are nontender.  Range of Motion:     Plantarflexion limited slightly by pain / dorsiflexion full / inversion full / eversion full  Strength:    limited slightly by pain with plantar flexion, otherwise 5/5  Special Tests:    negative anterior drawer, negative talar tilt, negative valgus stress, negative forced external rotation/eversion, negative Boo sign, negative squeeze test. Unable to perform heel raise and Unable to hop.    RIGHT FOOT  Inspection:    no swelling or ecchymosis  Palpation:  Non-tender.  Range of Motion:     Grossly intact and non-painful  Strength:    Grossly intact in all planes  Special Tests:    Positive: None    Negative: anterior drawer, heel strike and calcaneal squeeze    Independent visualization of the below image:  No results found for this or any previous visit (from the past 24 hour(s)).  RIGHT ANKLE THREE OR MORE VIEWS   12/11/2018 2:21 PM      HISTORY: Right ankle pain.                                                                    IMPRESSION: Unremarkable exam.     TRIXIE ADKINS MD    Patient's conditions were thoroughly discussed during today's visit with greater than 50% of the visit spent counseling the patient with total time spent face-to-face with the patient being 30 minutes.    Mukul Castañeda MD [facility]      Again, thank you for allowing me to participate in the care of your patient.        Sincerely,        Mukul Castañeda MD

## 2019-07-30 NOTE — PATIENT INSTRUCTIONS
Diagnosis: Chronic Right Ankle Pain  Image Findings: Neg ankle X-ray  Treatment: Continue ankle brace  Medications: Limited tylenol/ibuprofen  Follow-up: After MRI for possible steroid injection    It was great seeing you today!    Mukul Castañeda

## 2019-07-30 NOTE — LETTER
[facility]  [address]  [place]  [address]  [phone]          2019    Saravanan Park  [address]  [place] [address] [phone] (home)     :     1995      To Whom it May Concern:    This patient was seen on 2019 for chronic right ankle pain.  He can return to work without restrictions starting 19    Please contact me for questions or concerns.    Sincerely,    Mukul Castañeda

## 2019-07-31 ENCOUNTER — ANCILLARY PROCEDURE (OUTPATIENT)
Dept: MRI IMAGING | Facility: CLINIC | Age: 24
End: 2019-07-31
Attending: FAMILY MEDICINE
Payer: COMMERCIAL

## 2019-07-31 DIAGNOSIS — G89.29 CHRONIC PAIN OF RIGHT ANKLE: ICD-10-CM

## 2019-07-31 DIAGNOSIS — M25.571 CHRONIC PAIN OF RIGHT ANKLE: ICD-10-CM

## 2019-07-31 PROCEDURE — 73721 MRI JNT OF LWR EXTRE W/O DYE: CPT | Mod: TC

## 2019-08-06 ENCOUNTER — OFFICE VISIT (OUTPATIENT)
Dept: ORTHOPEDICS | Facility: CLINIC | Age: 24
End: 2019-08-06
Payer: COMMERCIAL

## 2019-08-06 VITALS
BODY MASS INDEX: 44.1 KG/M2 | WEIGHT: 315 LBS | DIASTOLIC BLOOD PRESSURE: 84 MMHG | SYSTOLIC BLOOD PRESSURE: 128 MMHG | HEIGHT: 71 IN

## 2019-08-06 DIAGNOSIS — M25.571 CHRONIC PAIN OF RIGHT ANKLE: Primary | ICD-10-CM

## 2019-08-06 DIAGNOSIS — G89.29 CHRONIC PAIN OF RIGHT ANKLE: Primary | ICD-10-CM

## 2019-08-06 PROCEDURE — 99214 OFFICE O/P EST MOD 30 MIN: CPT | Performed by: FAMILY MEDICINE

## 2019-08-06 RX ORDER — CYCLOBENZAPRINE HCL 10 MG
10 TABLET ORAL
Qty: 30 TABLET | Refills: 1 | Status: SHIPPED | OUTPATIENT
Start: 2019-08-06 | End: 2021-10-01

## 2019-08-06 RX ORDER — NABUMETONE 500 MG/1
500 TABLET, FILM COATED ORAL 2 TIMES DAILY
Qty: 30 TABLET | Refills: 1 | Status: SHIPPED | OUTPATIENT
Start: 2019-08-06 | End: 2021-10-01

## 2019-08-06 ASSESSMENT — MIFFLIN-ST. JEOR: SCORE: 2682.03

## 2019-08-06 NOTE — LETTER
8/6/2019         RE: Saravanan Park  [address]  [place] [address]        Dear Colleague,    Thank you for referring your patient, Saravanan Park, to the [facility] [place]. Please see a copy of my visit note below.    ASSESSMENT & PLAN  Saravanan was seen today for pain.    Diagnoses and all orders for this visit:    Chronic pain of right ankle  -     nabumetone (RELAFEN) 500 MG tablet; Take 1 tablet (500 mg) by mouth 2 times daily  -     cyclobenzaprine (FLEXERIL) 10 MG tablet; Take 1 tablet (10 mg) by mouth nightly as needed for muscle spasms      Patient is a 24 year old male presenting for evaluation of   Chief Complaint   Patient presents with     Right Ankle - Pain     Chronic Right Ankle Pain: Notable over the medial ankle with pos too many toes sign.  Previous XR on 12/18 negative. MRI showing medial talus/calcaneus marrow edema concerning for stress reaction with chronic Achilles tendinopathy.  Given pain persisting plan to place in boot for 3 weeks and f/u for reevaluation  Treatment: CAM boot  Physical Therapy none  Injection none  Medications  Limited NSAIDs/Tylenol    Concerning signs/sx that would warrant urgent evaluation were discussed.  All questions were answered, patient understands and agrees with plan.      Return in about 3 weeks (around 8/27/2019).      -----    SUBJECTIVE  Saravanan Park is a/an 24 year old male who is seen as a self referral AIC for evaluation of right ankle pain. The patient is seen by themselves.    Onset: 2 month(s) ago. Reports insidious onset without acute precipitating event. 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 Calabrese 12/11/18, self limiting pain for years   Relevant  surgical history: NO    Interim History - August 6, 2019  Since last visit on 7/30/2019 patient has persisting pain Achilles and medial ankle.  Patient is here today to review MRI results.  No interim injury.       History reviewed. No pertinent past medical history.  Social History     Socioeconomic History     Marital status: Single     Spouse name: None     Number of children: None     Years of education: None     Highest education level: None   Occupational History     None   Social Needs     Financial resource strain: None     Food insecurity:     Worry: None     Inability: None     Transportation needs:     Medical: None     Non-medical: None   Tobacco Use     Smoking status: Never Smoker     Smokeless tobacco: Never Used   Substance and Sexual Activity     Alcohol use: Yes     Comment: occ     Drug use: No     Sexual activity: Yes     Partners: Female     Birth control/protection: Condom   Lifestyle     Physical activity:     Days per week: None     Minutes per session: None     Stress: None   Relationships     Social connections:     Talks on phone: None     Gets together: None     Attends Yazidism service: None     Active member of club or organization: None     Attends meetings of clubs or organizations: None     Relationship status: None     Intimate partner violence:     Fear of current or ex partner: None     Emotionally abused: None     Physically abused: None     Forced sexual activity: None   Other Topics Concern     Parent/sibling w/ CABG, MI or angioplasty before 65F 55M? Not Asked   Social History Narrative     None         Patient's past medical, surgical, social, and family histories were reviewed today and no changes are noted.    REVIEW OF SYSTEMS:  10 point ROS is negative other than symptoms noted above in HPI, Past Medical History or as stated below  Constitutional: NEGATIVE for fever, chills, change in weight  Skin: NEGATIVE for worrisome rashes, moles or lesions  GI/: NEGATIVE for  "bowel or bladder changes  Neuro: NEGATIVE for weakness, dizziness or paresthesias    OBJECTIVE:  /84   Ht 1.791 m (5' 10.5\")   Wt (!) 168.3 kg (371 lb)   BMI 52.48 kg/m      General: healthy, alert and in no distress  HEENT: no scleral icterus or conjunctival erythema  Skin: no suspicious lesions or rash. No jaundice.  CV:  no pedal edema  Resp: normal respiratory effort without conversational dyspnea   Psych: normal mood and affect  Gait: normal steady gait with appropriate coordination and balance  Neuro: Normal light sensory exam of lower extremity  MSK:  RIGHT ANKLE  Inspection:  Mild medial ankle swelling, too many toes sign  Palpation:    Tender about the medial ankle. Remainder of bony and ligamentous landmarks are nontender.  Range of Motion:     Plantarflexion limited slightly by pain / dorsiflexion full / inversion full / eversion full  Strength:    limited slightly by pain with plantar flexion, otherwise 5/5  Special Tests:    negative anterior drawer, negative talar tilt, negative valgus stress, negative forced external rotation/eversion, negative Boo sign, negative squeeze test. Unable to perform heel raise and Unable to hop.    RIGHT FOOT  Inspection:    no swelling or ecchymosis  Palpation:  Non-tender.  Range of Motion:     Grossly intact and non-painful  Strength:    Grossly intact in all planes  Special Tests:    Positive: None    Negative: anterior drawer, heel strike and calcaneal squeeze    Independent visualization of the below image:  No results found for this or any previous visit (from the past 24 hour(s)).   MR ANKLE RIGHT WITHOUT CONTRAST 7/31/2019 3:17 PM  IMPRESSION:    1. Small amount of ill-defined bone marrow edema in the lateral  calcaneus and lateral talar neck, potentially representing mild stress  reactions. No fracture is identified.  2. Mild chronic Achilles tendinitis.  3. Chronic tear anterior talofibular ligament. Probable chronic tear  interosseous " talocalcaneal ligament.  4. Sinus tarsi syndrome.     BRANDIN RINALDI MD    RIGHT ANKLE THREE OR MORE VIEWS   12/11/2018 2:21 PM      HISTORY: Right ankle pain.                                                                    IMPRESSION: Unremarkable exam.     RTIXIE ADKINS MD    Patient's conditions were thoroughly discussed during today's visit with greater than 50% of the visit spent counseling the patient with total time spent face-to-face with the patient being 30 minutes.    Mukul Castañeda MD [facility]      Again, thank you for allowing me to participate in the care of your patient.        Sincerely,        Mukul Castañeda MD

## 2019-08-06 NOTE — LETTER
[facility]  [address]  [place]  [address]  [phone]          2019      Saravanan Park  [address]  [place] [address] [phone] (home)     :     1995      To Whom it May Concern:    This patient was seen on 2019 for chronic right ankle pain.  He can return to work without restrictions starting 19 but will need to be in CAM boot.    Please contact me for questions or concerns.    Sincerely,    Mukul Castañeda

## 2019-08-06 NOTE — PROGRESS NOTES
ASSESSMENT & PLAN  Saravanan was seen today for pain.    Diagnoses and all orders for this visit:    Chronic pain of right ankle  -     nabumetone (RELAFEN) 500 MG tablet; Take 1 tablet (500 mg) by mouth 2 times daily  -     cyclobenzaprine (FLEXERIL) 10 MG tablet; Take 1 tablet (10 mg) by mouth nightly as needed for muscle spasms      Patient is a 24 year old male presenting for evaluation of   Chief Complaint   Patient presents with     Right Ankle - Pain     Chronic Right Ankle Pain: Notable over the medial ankle with pos too many toes sign.  Previous XR on 12/18 negative. MRI showing medial talus/calcaneus marrow edema concerning for stress reaction with chronic Achilles tendinopathy.  Given pain persisting plan to place in boot for 3 weeks and f/u for reevaluation  Treatment: CAM boot  Physical Therapy none  Injection none  Medications  Limited NSAIDs/Tylenol    Concerning signs/sx that would warrant urgent evaluation were discussed.  All questions were answered, patient understands and agrees with plan.      Return in about 3 weeks (around 8/27/2019).      -----    SUBJECTIVE  Saravanan Park is a/an 24 year old male who is seen as a self referral AIC for evaluation of right ankle pain. The patient is seen by themselves.    Onset: 2 month(s) ago. Reports insidious onset without acute precipitating event. 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 Calabrese 12/11/18, self limiting pain for years   Relevant surgical history: NO    Interim History - August 6, 2019  Since last visit on 7/30/2019 patient has persisting pain Achilles and medial ankle.  Patient is here today to review MRI results.  No interim injury.       History reviewed. No pertinent past  "medical history.  Social History     Socioeconomic History     Marital status: Single     Spouse name: None     Number of children: None     Years of education: None     Highest education level: None   Occupational History     None   Social Needs     Financial resource strain: None     Food insecurity:     Worry: None     Inability: None     Transportation needs:     Medical: None     Non-medical: None   Tobacco Use     Smoking status: Never Smoker     Smokeless tobacco: Never Used   Substance and Sexual Activity     Alcohol use: Yes     Comment: occ     Drug use: No     Sexual activity: Yes     Partners: Female     Birth control/protection: Condom   Lifestyle     Physical activity:     Days per week: None     Minutes per session: None     Stress: None   Relationships     Social connections:     Talks on phone: None     Gets together: None     Attends [employer] service: None     Active member of club or organization: None     Attends meetings of clubs or organizations: None     Relationship status: None     Intimate partner violence:     Fear of current or ex partner: None     Emotionally abused: None     Physically abused: None     Forced sexual activity: None   Other Topics Concern     Parent/sibling w/ CABG, MI or angioplasty before 65F 55M? Not Asked   Social History Narrative     None         Patient's past medical, surgical, social, and family histories were reviewed today and no changes are noted.    REVIEW OF SYSTEMS:  10 point ROS is negative other than symptoms noted above in HPI, Past Medical History or as stated below  Constitutional: NEGATIVE for fever, chills, change in weight  Skin: NEGATIVE for worrisome rashes, moles or lesions  GI/: NEGATIVE for bowel or bladder changes  Neuro: NEGATIVE for weakness, dizziness or paresthesias    OBJECTIVE:  /84   Ht 1.791 m (5' 10.5\")   Wt (!) 168.3 kg (371 lb)   BMI 52.48 kg/m     General: healthy, alert and in no distress  HEENT: no scleral icterus or " conjunctival erythema  Skin: no suspicious lesions or rash. No jaundice.  CV:  no pedal edema  Resp: normal respiratory effort without conversational dyspnea   Psych: normal mood and affect  Gait: normal steady gait with appropriate coordination and balance  Neuro: Normal light sensory exam of lower extremity  MSK:  RIGHT ANKLE  Inspection:  Mild medial ankle swelling, too many toes sign  Palpation:    Tender about the medial ankle. Remainder of bony and ligamentous landmarks are nontender.  Range of Motion:     Plantarflexion limited slightly by pain / dorsiflexion full / inversion full / eversion full  Strength:    limited slightly by pain with plantar flexion, otherwise 5/5  Special Tests:    negative anterior drawer, negative talar tilt, negative valgus stress, negative forced external rotation/eversion, negative Boo sign, negative squeeze test. Unable to perform heel raise and Unable to hop.    RIGHT FOOT  Inspection:    no swelling or ecchymosis  Palpation:  Non-tender.  Range of Motion:     Grossly intact and non-painful  Strength:    Grossly intact in all planes  Special Tests:    Positive: None    Negative: anterior drawer, heel strike and calcaneal squeeze    Independent visualization of the below image:  No results found for this or any previous visit (from the past 24 hour(s)).   MR ANKLE RIGHT WITHOUT CONTRAST 7/31/2019 3:17 PM  IMPRESSION:    1. Small amount of ill-defined bone marrow edema in the lateral  calcaneus and lateral talar neck, potentially representing mild stress  reactions. No fracture is identified.  2. Mild chronic Achilles tendinitis.  3. Chronic tear anterior talofibular ligament. Probable chronic tear  interosseous talocalcaneal ligament.  4. Sinus tarsi syndrome.     BRANDIN RINALDI MD    RIGHT ANKLE THREE OR MORE VIEWS   12/11/2018 2:21 PM      HISTORY: Right ankle pain.                                                                    IMPRESSION: Unremarkable exam.     TRIXIE  MD JED    Patient's conditions were thoroughly discussed during today's visit with greater than 50% of the visit spent counseling the patient with total time spent face-to-face with the patient being 30 minutes.    Mukul Castañeda MD [facility]

## 2019-08-06 NOTE — PATIENT INSTRUCTIONS
Diagnosis: Right Ankle Medial Stress Reaction at Talus and Calcaneus, chronic Achilles tendinitis  Image Findings: Stress reaction at medial calcaneus/talus, Achilles Tendinitis  Treatment: CAM boot  Medications: Relafen as Prescribed, Flexeril at night  Follow-up: 3 weeks    It was great seeing you again today!    Mukul Castañeda

## 2020-08-10 ENCOUNTER — AMBULATORY - HEALTHEAST (OUTPATIENT)
Dept: CARE COORDINATION | Facility: CLINIC | Age: 25
End: 2020-08-10

## 2020-08-10 DIAGNOSIS — U07.1 2019 NOVEL CORONAVIRUS DISEASE (COVID-19): ICD-10-CM

## 2020-08-12 ENCOUNTER — COMMUNICATION - HEALTHEAST (OUTPATIENT)
Dept: NURSING | Facility: CLINIC | Age: 25
End: 2020-08-12

## 2020-08-12 ENCOUNTER — OFFICE VISIT - HEALTHEAST (OUTPATIENT)
Dept: FAMILY MEDICINE | Facility: CLINIC | Age: 25
End: 2020-08-12

## 2020-08-12 DIAGNOSIS — U07.1 COVID-19: ICD-10-CM

## 2020-08-12 DIAGNOSIS — J12.82 PNEUMONIA DUE TO 2019 NOVEL CORONAVIRUS: ICD-10-CM

## 2020-08-12 DIAGNOSIS — E66.01 MORBID OBESITY (H): ICD-10-CM

## 2020-08-12 DIAGNOSIS — E11.9 INSULIN DEPENDENT TYPE 2 DIABETES MELLITUS (H): ICD-10-CM

## 2020-08-12 DIAGNOSIS — E11.65 TYPE 2 DIABETES MELLITUS WITH HYPERGLYCEMIA, UNSPECIFIED WHETHER LONG TERM INSULIN USE (H): ICD-10-CM

## 2020-08-12 DIAGNOSIS — Z79.4 INSULIN DEPENDENT TYPE 2 DIABETES MELLITUS (H): ICD-10-CM

## 2020-08-12 DIAGNOSIS — U07.1 PNEUMONIA DUE TO 2019 NOVEL CORONAVIRUS: ICD-10-CM

## 2020-08-18 ENCOUNTER — OFFICE VISIT - HEALTHEAST (OUTPATIENT)
Dept: FAMILY MEDICINE | Facility: CLINIC | Age: 25
End: 2020-08-18

## 2020-08-18 DIAGNOSIS — U07.1 PNEUMONIA DUE TO 2019 NOVEL CORONAVIRUS: ICD-10-CM

## 2020-08-18 DIAGNOSIS — E11.9 INSULIN DEPENDENT TYPE 2 DIABETES MELLITUS (H): ICD-10-CM

## 2020-08-18 DIAGNOSIS — Z79.4 INSULIN DEPENDENT TYPE 2 DIABETES MELLITUS (H): ICD-10-CM

## 2020-08-18 DIAGNOSIS — J12.82 PNEUMONIA DUE TO 2019 NOVEL CORONAVIRUS: ICD-10-CM

## 2020-08-25 ENCOUNTER — OFFICE VISIT - HEALTHEAST (OUTPATIENT)
Dept: FAMILY MEDICINE | Facility: CLINIC | Age: 25
End: 2020-08-25

## 2020-08-25 DIAGNOSIS — E11.9 INSULIN DEPENDENT TYPE 2 DIABETES MELLITUS (H): ICD-10-CM

## 2020-08-25 DIAGNOSIS — Z79.4 INSULIN DEPENDENT TYPE 2 DIABETES MELLITUS (H): ICD-10-CM

## 2020-08-25 DIAGNOSIS — U07.1 COVID-19: ICD-10-CM

## 2020-09-08 ENCOUNTER — COMMUNICATION - HEALTHEAST (OUTPATIENT)
Dept: FAMILY MEDICINE | Facility: CLINIC | Age: 25
End: 2020-09-08

## 2020-09-18 ENCOUNTER — OFFICE VISIT - HEALTHEAST (OUTPATIENT)
Dept: FAMILY MEDICINE | Facility: CLINIC | Age: 25
End: 2020-09-18

## 2020-09-18 ENCOUNTER — COMMUNICATION - HEALTHEAST (OUTPATIENT)
Dept: FAMILY MEDICINE | Facility: CLINIC | Age: 25
End: 2020-09-18

## 2020-09-18 DIAGNOSIS — E66.01 MORBID OBESITY (H): ICD-10-CM

## 2020-09-18 DIAGNOSIS — Z23 NEED FOR IMMUNIZATION AGAINST INFLUENZA: ICD-10-CM

## 2020-09-18 DIAGNOSIS — E11.9 INSULIN DEPENDENT TYPE 2 DIABETES MELLITUS (H): ICD-10-CM

## 2020-09-18 DIAGNOSIS — Z86.16 HISTORY OF 2019 NOVEL CORONAVIRUS DISEASE (COVID-19): ICD-10-CM

## 2020-09-18 DIAGNOSIS — B07.0 PLANTAR WARTS: ICD-10-CM

## 2020-09-18 DIAGNOSIS — Z79.4 INSULIN DEPENDENT TYPE 2 DIABETES MELLITUS (H): ICD-10-CM

## 2020-09-18 DIAGNOSIS — E78.5 HYPERLIPIDEMIA, UNSPECIFIED HYPERLIPIDEMIA TYPE: ICD-10-CM

## 2020-09-18 LAB
ALBUMIN SERPL-MCNC: 3.9 G/DL (ref 3.5–5)
ALP SERPL-CCNC: 40 U/L (ref 45–120)
ALT SERPL W P-5'-P-CCNC: 42 U/L (ref 0–45)
ANION GAP SERPL CALCULATED.3IONS-SCNC: 10 MMOL/L (ref 5–18)
AST SERPL W P-5'-P-CCNC: 42 U/L (ref 0–40)
BILIRUB SERPL-MCNC: 0.8 MG/DL (ref 0–1)
BUN SERPL-MCNC: 9 MG/DL (ref 8–22)
CALCIUM SERPL-MCNC: 9.8 MG/DL (ref 8.5–10.5)
CHLORIDE BLD-SCNC: 107 MMOL/L (ref 98–107)
CHOLEST SERPL-MCNC: 197 MG/DL
CO2 SERPL-SCNC: 24 MMOL/L (ref 22–31)
CREAT SERPL-MCNC: 0.81 MG/DL (ref 0.7–1.3)
CREAT UR-MCNC: 125.4 MG/DL
ERYTHROCYTE [DISTWIDTH] IN BLOOD BY AUTOMATED COUNT: 12.6 % (ref 11–14.5)
FASTING STATUS PATIENT QL REPORTED: NO
GFR SERPL CREATININE-BSD FRML MDRD: >60 ML/MIN/1.73M2
GLUCOSE BLD-MCNC: 123 MG/DL (ref 70–125)
HBA1C MFR BLD: 9.3 %
HCT VFR BLD AUTO: 46.1 % (ref 40–54)
HDLC SERPL-MCNC: 45 MG/DL
HGB BLD-MCNC: 15.3 G/DL (ref 14–18)
LDLC SERPL CALC-MCNC: 122 MG/DL
MCH RBC QN AUTO: 28.9 PG (ref 27–34)
MCHC RBC AUTO-ENTMCNC: 33.2 G/DL (ref 32–36)
MCV RBC AUTO: 87 FL (ref 80–100)
MICROALBUMIN UR-MCNC: 0.62 MG/DL (ref 0–1.99)
MICROALBUMIN/CREAT UR: 4.9 MG/G
PLATELET # BLD AUTO: 209 THOU/UL (ref 140–440)
PMV BLD AUTO: 8.8 FL (ref 7–10)
POTASSIUM BLD-SCNC: 4.2 MMOL/L (ref 3.5–5)
PROT SERPL-MCNC: 8 G/DL (ref 6–8)
RBC # BLD AUTO: 5.28 MILL/UL (ref 4.4–6.2)
SODIUM SERPL-SCNC: 141 MMOL/L (ref 136–145)
TRIGL SERPL-MCNC: 152 MG/DL
WBC: 4.9 THOU/UL (ref 4–11)

## 2020-09-18 ASSESSMENT — MIFFLIN-ST. JEOR: SCORE: 2416.89

## 2020-09-21 ENCOUNTER — COMMUNICATION - HEALTHEAST (OUTPATIENT)
Dept: FAMILY MEDICINE | Facility: CLINIC | Age: 25
End: 2020-09-21

## 2020-09-22 ENCOUNTER — RECORDS - HEALTHEAST (OUTPATIENT)
Dept: ADMINISTRATIVE | Facility: OTHER | Age: 25
End: 2020-09-22

## 2020-09-29 ENCOUNTER — OFFICE VISIT - HEALTHEAST (OUTPATIENT)
Dept: FAMILY MEDICINE | Facility: CLINIC | Age: 25
End: 2020-09-29

## 2020-09-29 DIAGNOSIS — E66.01 MORBID OBESITY (H): ICD-10-CM

## 2020-09-29 DIAGNOSIS — L03.314 CELLULITIS OF GROIN: ICD-10-CM

## 2020-09-29 DIAGNOSIS — K61.2 ABSCESS OF ANAL AND RECTAL REGIONS: ICD-10-CM

## 2020-09-29 DIAGNOSIS — L02.214 ABSCESS OF GROIN, LEFT: ICD-10-CM

## 2020-09-29 ASSESSMENT — MIFFLIN-ST. JEOR: SCORE: 2430.78

## 2020-10-08 ENCOUNTER — RECORDS - HEALTHEAST (OUTPATIENT)
Dept: ADMINISTRATIVE | Facility: OTHER | Age: 25
End: 2020-10-08

## 2020-10-08 LAB — RETINOPATHY: NEGATIVE

## 2020-10-14 ENCOUNTER — RECORDS - HEALTHEAST (OUTPATIENT)
Dept: HEALTH INFORMATION MANAGEMENT | Facility: CLINIC | Age: 25
End: 2020-10-14

## 2020-10-19 ENCOUNTER — OFFICE VISIT - HEALTHEAST (OUTPATIENT)
Dept: SURGERY | Facility: CLINIC | Age: 25
End: 2020-10-19

## 2020-10-19 ENCOUNTER — SURGERY - HEALTHEAST (OUTPATIENT)
Dept: SURGERY | Facility: CLINIC | Age: 25
End: 2020-10-19

## 2020-10-19 DIAGNOSIS — K61.0 PERIANAL ABSCESS: ICD-10-CM

## 2020-10-22 ENCOUNTER — COMMUNICATION - HEALTHEAST (OUTPATIENT)
Dept: SURGERY | Facility: CLINIC | Age: 25
End: 2020-10-22

## 2020-10-28 ENCOUNTER — COMMUNICATION - HEALTHEAST (OUTPATIENT)
Dept: FAMILY MEDICINE | Facility: CLINIC | Age: 25
End: 2020-10-28

## 2020-10-28 DIAGNOSIS — E11.65 TYPE 2 DIABETES MELLITUS WITH HYPERGLYCEMIA, UNSPECIFIED WHETHER LONG TERM INSULIN USE (H): ICD-10-CM

## 2020-10-29 ENCOUNTER — COMMUNICATION - HEALTHEAST (OUTPATIENT)
Dept: SURGERY | Facility: CLINIC | Age: 25
End: 2020-10-29

## 2020-10-30 ENCOUNTER — AMBULATORY - HEALTHEAST (OUTPATIENT)
Dept: SURGERY | Facility: AMBULATORY SURGERY CENTER | Age: 25
End: 2020-10-30

## 2020-10-30 DIAGNOSIS — Z11.59 ENCOUNTER FOR SCREENING FOR OTHER VIRAL DISEASES: ICD-10-CM

## 2020-11-11 ENCOUNTER — COMMUNICATION - HEALTHEAST (OUTPATIENT)
Dept: EDUCATION SERVICES | Facility: CLINIC | Age: 25
End: 2020-11-11

## 2020-11-14 ENCOUNTER — AMBULATORY - HEALTHEAST (OUTPATIENT)
Dept: FAMILY MEDICINE | Facility: CLINIC | Age: 25
End: 2020-11-14

## 2020-11-14 DIAGNOSIS — Z11.59 ENCOUNTER FOR SCREENING FOR OTHER VIRAL DISEASES: ICD-10-CM

## 2020-11-15 LAB
SARS-COV-2 PCR COMMENT: NORMAL
SARS-COV-2 RNA SPEC QL NAA+PROBE: NEGATIVE
SARS-COV-2 VIRUS SPECIMEN SOURCE: NORMAL

## 2020-11-16 ENCOUNTER — COMMUNICATION - HEALTHEAST (OUTPATIENT)
Dept: SCHEDULING | Facility: CLINIC | Age: 25
End: 2020-11-16

## 2020-11-16 ENCOUNTER — ANESTHESIA - HEALTHEAST (OUTPATIENT)
Dept: SURGERY | Facility: AMBULATORY SURGERY CENTER | Age: 25
End: 2020-11-16

## 2020-11-16 ASSESSMENT — MIFFLIN-ST. JEOR: SCORE: 2439.57

## 2020-11-17 ENCOUNTER — SURGERY - HEALTHEAST (OUTPATIENT)
Dept: SURGERY | Facility: AMBULATORY SURGERY CENTER | Age: 25
End: 2020-11-17

## 2020-11-30 ENCOUNTER — COMMUNICATION - HEALTHEAST (OUTPATIENT)
Dept: SURGERY | Facility: CLINIC | Age: 25
End: 2020-11-30

## 2020-11-30 ENCOUNTER — OFFICE VISIT - HEALTHEAST (OUTPATIENT)
Dept: SURGERY | Facility: CLINIC | Age: 25
End: 2020-11-30

## 2020-11-30 DIAGNOSIS — K60.30 FISTULA-IN-ANO: ICD-10-CM

## 2020-12-03 ENCOUNTER — AMBULATORY - HEALTHEAST (OUTPATIENT)
Dept: EDUCATION SERVICES | Facility: CLINIC | Age: 25
End: 2020-12-03

## 2020-12-03 DIAGNOSIS — E11.9 INSULIN DEPENDENT TYPE 2 DIABETES MELLITUS (H): ICD-10-CM

## 2020-12-03 DIAGNOSIS — Z79.4 INSULIN DEPENDENT TYPE 2 DIABETES MELLITUS (H): ICD-10-CM

## 2020-12-04 ENCOUNTER — AMBULATORY - HEALTHEAST (OUTPATIENT)
Dept: EDUCATION SERVICES | Facility: CLINIC | Age: 25
End: 2020-12-04

## 2020-12-16 ENCOUNTER — OFFICE VISIT - HEALTHEAST (OUTPATIENT)
Dept: FAMILY MEDICINE | Facility: CLINIC | Age: 25
End: 2020-12-16

## 2020-12-16 DIAGNOSIS — E11.65 TYPE 2 DIABETES MELLITUS WITH HYPERGLYCEMIA, UNSPECIFIED WHETHER LONG TERM INSULIN USE (H): ICD-10-CM

## 2020-12-16 LAB — HBA1C MFR BLD: 7 %

## 2020-12-16 ASSESSMENT — MIFFLIN-ST. JEOR: SCORE: 2489.47

## 2021-01-04 ENCOUNTER — RECORDS - HEALTHEAST (OUTPATIENT)
Dept: ADMINISTRATIVE | Facility: OTHER | Age: 26
End: 2021-01-04

## 2021-01-05 ENCOUNTER — AMBULATORY - HEALTHEAST (OUTPATIENT)
Dept: SURGERY | Facility: AMBULATORY SURGERY CENTER | Age: 26
End: 2021-01-05

## 2021-01-05 ENCOUNTER — RECORDS - HEALTHEAST (OUTPATIENT)
Dept: ADMINISTRATIVE | Facility: OTHER | Age: 26
End: 2021-01-05

## 2021-01-05 DIAGNOSIS — Z11.59 ENCOUNTER FOR SCREENING FOR OTHER VIRAL DISEASES: ICD-10-CM

## 2021-01-22 ENCOUNTER — COMMUNICATION - HEALTHEAST (OUTPATIENT)
Dept: FAMILY MEDICINE | Facility: CLINIC | Age: 26
End: 2021-01-22

## 2021-01-22 ENCOUNTER — OFFICE VISIT - HEALTHEAST (OUTPATIENT)
Dept: FAMILY MEDICINE | Facility: CLINIC | Age: 26
End: 2021-01-22

## 2021-01-22 DIAGNOSIS — R06.83 SNORING: ICD-10-CM

## 2021-01-22 DIAGNOSIS — E66.01 MORBID OBESITY (H): ICD-10-CM

## 2021-01-22 DIAGNOSIS — Z01.818 PREOPERATIVE EXAMINATION: ICD-10-CM

## 2021-01-22 DIAGNOSIS — E11.9 TYPE 2 DIABETES MELLITUS WITHOUT COMPLICATION, WITHOUT LONG-TERM CURRENT USE OF INSULIN (H): ICD-10-CM

## 2021-01-22 LAB
ANION GAP SERPL CALCULATED.3IONS-SCNC: 15 MMOL/L (ref 5–18)
BUN SERPL-MCNC: 9 MG/DL (ref 8–22)
CALCIUM SERPL-MCNC: 9.3 MG/DL (ref 8.5–10.5)
CHLORIDE BLD-SCNC: 105 MMOL/L (ref 98–107)
CO2 SERPL-SCNC: 18 MMOL/L (ref 22–31)
CREAT SERPL-MCNC: 0.95 MG/DL (ref 0.7–1.3)
ERYTHROCYTE [DISTWIDTH] IN BLOOD BY AUTOMATED COUNT: 11.4 % (ref 11–14.5)
GFR SERPL CREATININE-BSD FRML MDRD: >60 ML/MIN/1.73M2
GLUCOSE BLD-MCNC: 181 MG/DL (ref 70–125)
HCT VFR BLD AUTO: 47 % (ref 40–54)
HGB BLD-MCNC: 15.6 G/DL (ref 14–18)
MCH RBC QN AUTO: 28.8 PG (ref 27–34)
MCHC RBC AUTO-ENTMCNC: 33.3 G/DL (ref 32–36)
MCV RBC AUTO: 87 FL (ref 80–100)
PLATELET # BLD AUTO: 178 THOU/UL (ref 140–440)
PMV BLD AUTO: 9.6 FL (ref 7–10)
POTASSIUM BLD-SCNC: 4.3 MMOL/L (ref 3.5–5)
RBC # BLD AUTO: 5.43 MILL/UL (ref 4.4–6.2)
SODIUM SERPL-SCNC: 138 MMOL/L (ref 136–145)
WBC: 6.9 THOU/UL (ref 4–11)

## 2021-01-24 ENCOUNTER — AMBULATORY - HEALTHEAST (OUTPATIENT)
Dept: FAMILY MEDICINE | Facility: CLINIC | Age: 26
End: 2021-01-24

## 2021-01-24 DIAGNOSIS — Z11.59 ENCOUNTER FOR SCREENING FOR OTHER VIRAL DISEASES: ICD-10-CM

## 2021-01-25 ENCOUNTER — RECORDS - HEALTHEAST (OUTPATIENT)
Dept: ADMINISTRATIVE | Facility: OTHER | Age: 26
End: 2021-01-25

## 2021-01-25 ENCOUNTER — COMMUNICATION - HEALTHEAST (OUTPATIENT)
Dept: SCHEDULING | Facility: CLINIC | Age: 26
End: 2021-01-25

## 2021-01-25 ENCOUNTER — AMBULATORY - HEALTHEAST (OUTPATIENT)
Dept: SURGERY | Facility: HOSPITAL | Age: 26
End: 2021-01-25

## 2021-01-25 DIAGNOSIS — Z11.59 ENCOUNTER FOR SCREENING FOR OTHER VIRAL DISEASES: ICD-10-CM

## 2021-01-26 ENCOUNTER — ANESTHESIA - HEALTHEAST (OUTPATIENT)
Dept: SURGERY | Facility: HOSPITAL | Age: 26
End: 2021-01-26

## 2021-01-27 ENCOUNTER — SURGERY - HEALTHEAST (OUTPATIENT)
Dept: SURGERY | Facility: HOSPITAL | Age: 26
End: 2021-01-27

## 2021-02-02 ENCOUNTER — COMMUNICATION - HEALTHEAST (OUTPATIENT)
Dept: SURGERY | Facility: CLINIC | Age: 26
End: 2021-02-02

## 2021-02-05 ENCOUNTER — OFFICE VISIT - HEALTHEAST (OUTPATIENT)
Dept: SURGERY | Facility: CLINIC | Age: 26
End: 2021-02-05

## 2021-02-05 DIAGNOSIS — K60.30 PERIANAL FISTULA: ICD-10-CM

## 2021-02-05 DIAGNOSIS — Z87.898 HISTORY OF SNORING: ICD-10-CM

## 2021-02-05 DIAGNOSIS — E66.01 OBESITY, MORBID, BMI 50 OR HIGHER (H): ICD-10-CM

## 2021-02-05 DIAGNOSIS — E11.628 TYPE 2 DIABETES MELLITUS WITH OTHER SKIN COMPLICATION, WITHOUT LONG-TERM CURRENT USE OF INSULIN (H): ICD-10-CM

## 2021-02-05 ASSESSMENT — MIFFLIN-ST. JEOR: SCORE: 2528.02

## 2021-02-10 ENCOUNTER — COMMUNICATION - HEALTHEAST (OUTPATIENT)
Dept: FAMILY MEDICINE | Facility: CLINIC | Age: 26
End: 2021-02-10

## 2021-02-10 ENCOUNTER — COMMUNICATION - HEALTHEAST (OUTPATIENT)
Dept: SURGERY | Facility: CLINIC | Age: 26
End: 2021-02-10

## 2021-02-15 ENCOUNTER — AMBULATORY - HEALTHEAST (OUTPATIENT)
Dept: SURGERY | Facility: CLINIC | Age: 26
End: 2021-02-15

## 2021-02-15 DIAGNOSIS — K60.30 PERIANAL FISTULA: ICD-10-CM

## 2021-02-15 DIAGNOSIS — E11.628 TYPE 2 DIABETES MELLITUS WITH OTHER SKIN COMPLICATION, WITHOUT LONG-TERM CURRENT USE OF INSULIN (H): ICD-10-CM

## 2021-02-15 DIAGNOSIS — E66.01 OBESITY, MORBID, BMI 50 OR HIGHER (H): ICD-10-CM

## 2021-03-16 ENCOUNTER — COMMUNICATION - HEALTHEAST (OUTPATIENT)
Dept: SURGERY | Facility: CLINIC | Age: 26
End: 2021-03-16

## 2021-03-17 ENCOUNTER — OFFICE VISIT - HEALTHEAST (OUTPATIENT)
Dept: FAMILY MEDICINE | Facility: CLINIC | Age: 26
End: 2021-03-17

## 2021-03-17 DIAGNOSIS — E66.01 OBESITY, MORBID, BMI 50 OR HIGHER (H): ICD-10-CM

## 2021-03-17 DIAGNOSIS — E11.628 TYPE 2 DIABETES MELLITUS WITH OTHER SKIN COMPLICATION, WITHOUT LONG-TERM CURRENT USE OF INSULIN (H): ICD-10-CM

## 2021-03-17 DIAGNOSIS — E11.9 TYPE 2 DIABETES MELLITUS WITHOUT COMPLICATION, WITHOUT LONG-TERM CURRENT USE OF INSULIN (H): ICD-10-CM

## 2021-03-17 LAB
ALBUMIN SERPL-MCNC: 4.1 G/DL (ref 3.5–5)
ALP SERPL-CCNC: 43 U/L (ref 45–120)
ALT SERPL W P-5'-P-CCNC: 33 U/L (ref 0–45)
ANION GAP SERPL CALCULATED.3IONS-SCNC: 13 MMOL/L (ref 5–18)
AST SERPL W P-5'-P-CCNC: 27 U/L (ref 0–40)
BILIRUB SERPL-MCNC: 1.2 MG/DL (ref 0–1)
BUN SERPL-MCNC: 9 MG/DL (ref 8–22)
CALCIUM SERPL-MCNC: 9.3 MG/DL (ref 8.5–10.5)
CHLORIDE BLD-SCNC: 102 MMOL/L (ref 98–107)
CO2 SERPL-SCNC: 22 MMOL/L (ref 22–31)
CREAT SERPL-MCNC: 0.83 MG/DL (ref 0.7–1.3)
FERRITIN SERPL-MCNC: 709 NG/ML (ref 27–300)
FOLATE SERPL-MCNC: 9.5 NG/ML
GFR SERPL CREATININE-BSD FRML MDRD: >60 ML/MIN/1.73M2
GLUCOSE BLD-MCNC: 128 MG/DL (ref 70–125)
HBA1C MFR BLD: 8.3 %
MAGNESIUM SERPL-MCNC: 1.8 MG/DL (ref 1.8–2.6)
POTASSIUM BLD-SCNC: 4 MMOL/L (ref 3.5–5)
PROT SERPL-MCNC: 7.5 G/DL (ref 6–8)
PTH-INTACT SERPL-MCNC: 60 PG/ML (ref 10–86)
SODIUM SERPL-SCNC: 137 MMOL/L (ref 136–145)
TSH SERPL DL<=0.005 MIU/L-ACNC: 2.2 UIU/ML (ref 0.3–5)

## 2021-03-17 ASSESSMENT — MIFFLIN-ST. JEOR: SCORE: 2539.36

## 2021-03-18 LAB
25(OH)D3 SERPL-MCNC: 8.8 NG/ML (ref 30–80)
C PEPTIDE SERPL-MCNC: 6.3 NG/ML (ref 0.9–6.9)

## 2021-03-20 LAB
ANNOTATION COMMENT IMP: NORMAL
COPPER SERPL-MCNC: 101.3 UG/DL (ref 70–140)
VIT A SERPL-MCNC: 0.87 MG/L (ref 0.3–1.2)
VITAMIN A (RETINYL PALMITATE): <0.02 MG/L (ref 0–0.1)

## 2021-03-21 LAB — VIT B1 PYROPHOSHATE BLD-SCNC: 64 NMOL/L (ref 70–180)

## 2021-03-22 ENCOUNTER — RECORDS - HEALTHEAST (OUTPATIENT)
Dept: ADMINISTRATIVE | Facility: OTHER | Age: 26
End: 2021-03-22

## 2021-03-22 LAB — ZINC SERPL-MCNC: 66 UG/DL (ref 60–120)

## 2021-03-24 ENCOUNTER — AMBULATORY - HEALTHEAST (OUTPATIENT)
Dept: SURGERY | Facility: CLINIC | Age: 26
End: 2021-03-24

## 2021-03-24 ENCOUNTER — COMMUNICATION - HEALTHEAST (OUTPATIENT)
Dept: SURGERY | Facility: CLINIC | Age: 26
End: 2021-03-24

## 2021-03-24 DIAGNOSIS — R79.89 LOW VITAMIN D LEVEL: ICD-10-CM

## 2021-03-24 DIAGNOSIS — E51.9 THIAMINE DEFICIENCY: ICD-10-CM

## 2021-03-28 ENCOUNTER — AMBULATORY - HEALTHEAST (OUTPATIENT)
Dept: SURGERY | Facility: CLINIC | Age: 26
End: 2021-03-28

## 2021-03-28 DIAGNOSIS — E66.01 OBESITY, MORBID, BMI 50 OR HIGHER (H): ICD-10-CM

## 2021-04-09 ENCOUNTER — AMBULATORY - HEALTHEAST (OUTPATIENT)
Dept: NURSING | Facility: CLINIC | Age: 26
End: 2021-04-09

## 2021-04-30 ENCOUNTER — AMBULATORY - HEALTHEAST (OUTPATIENT)
Dept: NURSING | Facility: CLINIC | Age: 26
End: 2021-04-30

## 2021-05-27 ENCOUNTER — RECORDS - HEALTHEAST (OUTPATIENT)
Dept: ADMINISTRATIVE | Facility: CLINIC | Age: 26
End: 2021-05-27

## 2021-06-04 VITALS
BODY MASS INDEX: 46.65 KG/M2 | DIASTOLIC BLOOD PRESSURE: 86 MMHG | SYSTOLIC BLOOD PRESSURE: 134 MMHG | WEIGHT: 315 LBS | TEMPERATURE: 98.4 F | HEART RATE: 80 BPM | RESPIRATION RATE: 18 BRPM | HEIGHT: 69 IN

## 2021-06-04 VITALS — HEIGHT: 69 IN | BODY MASS INDEX: 46.65 KG/M2 | WEIGHT: 315 LBS

## 2021-06-04 VITALS
DIASTOLIC BLOOD PRESSURE: 84 MMHG | SYSTOLIC BLOOD PRESSURE: 136 MMHG | HEART RATE: 94 BPM | HEIGHT: 69 IN | BODY MASS INDEX: 46.65 KG/M2 | OXYGEN SATURATION: 97 % | WEIGHT: 315 LBS | TEMPERATURE: 99.4 F

## 2021-06-05 VITALS
BODY MASS INDEX: 46.65 KG/M2 | HEIGHT: 69 IN | OXYGEN SATURATION: 97 % | HEART RATE: 104 BPM | SYSTOLIC BLOOD PRESSURE: 135 MMHG | DIASTOLIC BLOOD PRESSURE: 85 MMHG | TEMPERATURE: 99.2 F | WEIGHT: 315 LBS

## 2021-06-05 VITALS
TEMPERATURE: 98.7 F | RESPIRATION RATE: 20 BRPM | WEIGHT: 315 LBS | SYSTOLIC BLOOD PRESSURE: 136 MMHG | HEART RATE: 88 BPM | BODY MASS INDEX: 50.91 KG/M2 | DIASTOLIC BLOOD PRESSURE: 82 MMHG | OXYGEN SATURATION: 97 %

## 2021-06-05 VITALS
DIASTOLIC BLOOD PRESSURE: 76 MMHG | BODY MASS INDEX: 46.65 KG/M2 | WEIGHT: 315 LBS | SYSTOLIC BLOOD PRESSURE: 124 MMHG | HEIGHT: 69 IN

## 2021-06-05 VITALS
HEART RATE: 68 BPM | TEMPERATURE: 98 F | SYSTOLIC BLOOD PRESSURE: 138 MMHG | OXYGEN SATURATION: 98 % | DIASTOLIC BLOOD PRESSURE: 80 MMHG | HEIGHT: 69 IN | WEIGHT: 315 LBS | BODY MASS INDEX: 46.65 KG/M2

## 2021-06-10 NOTE — PROGRESS NOTES
"Saravanan Park is a 25 y.o. male who is being evaluated via a billable telephone visit.      The patient has been notified of following:     \"This telephone visit will be conducted via a call between you and your physician/provider. We have found that certain health care needs can be provided without the need for a physical exam.  This service lets us provide the care you need with a short phone conversation.  If a prescription is necessary we can send it directly to your pharmacy.  If lab work is needed we can place an order for that and you can then stop by our lab to have the test done at a later time.    Telephone visits are billed at different rates depending on your insurance coverage. During this emergency period, for some insurers they may be billed the same as an in-person visit.  Please reach out to your insurance provider with any questions.    If during the course of the call the physician/provider feels a telephone visit is not appropriate, you will not be charged for this service.\"    Patient has given verbal consent to a Telephone visit? Yes    What phone number would you like to be contacted at? See chart    Patient would like to receive their AVS by: declines    Additional provider notes:      Fasting 147, 180 (150-200)  Lantus- 30 units  Insulin lispro 10 units with meals.  Took metformin 500 this morning- tolerated well  Letter for work was through 8/21  He reports HR is requesting 2 negative tests- he was planning to go to [facility] this weekend and next week.   Still feeling shortness of breath with minimal activity    How are you feeling today? Much better  In the past 24 hours have you had shortness of breath when speaking, walking, or climbing stairs? My breathing issues have improved  Do you have a cough? Yes, I have a cough but it's not worse  When is the last time you had a fever greater than 100? Not in the last week  Are you having any other symptoms? Tired, shortness of breath with going " up stairs and downstairs.  Do you have any other stressors you would like to discuss with your provider? No            Assessment/Plan:    Saravanan was seen today for follow-up.    Diagnoses and all orders for this visit:    Insulin dependent type 2 diabetes mellitus (H): Continue lantus 30 units, lispro 10 units with meals. Continue checking fasting sugars and write down to review in 1 week. Ramp up metformin- he started today- reviewed how to titrate up to 2 g daily.     Pneumonia due to 2019 novel coronavirus: Improved overall but still with significant shortness of breath with minimal exertion. Cough improved. Will write letter stating he should remain out of work through 8/28/20- will reassess in 1 week. He reports short term disability paperwork- gave fax # for HR to send to me to fill out. He reports he needs two negative Covid-19 tests before going back to work per company policy- he will get this done at [facility]- he will notify me if he needs any orders.       Phone call duration:  11 minutes    Kelly Watt MD

## 2021-06-10 NOTE — PROGRESS NOTES
"Saravanan Park is a 25 y.o. male who is being evaluated via a billable telephone visit.      The patient has been notified of following:     \"This telephone visit will be conducted via a call between you and your physician/provider. We have found that certain health care needs can be provided without the need for a physical exam.  This service lets us provide the care you need with a short phone conversation.  If a prescription is necessary we can send it directly to your pharmacy.  If lab work is needed we can place an order for that and you can then stop by our lab to have the test done at a later time.    Telephone visits are billed at different rates depending on your insurance coverage. During this emergency period, for some insurers they may be billed the same as an in-person visit.  Please reach out to your insurance provider with any questions.    If during the course of the call the physician/provider feels a telephone visit is not appropriate, you will not be charged for this service.\"    Patient has given verbal consent to a Telephone visit? Yes    What phone number would you like to be contacted at? [phone]    Patient would like to receive their AVS by AVS Preference: Mail a copy.    Additional provider notes:     Chief Complaint   Patient presents with     Diabetes     covid  f/u     Able to do a lot more things  Able to do everyday tasks- able to walk up stairs  Shortness of breath much improved  Has not gotten back to gym.  Still with cough- much better  Has better appetite  Sleep schedule has been off.  Had testing on 8/21 that was negative  His employer requires two negative tests prior to coming back to work  Has scheduled testing on 8/28 at [facility] and will get results back same day    Taking metformin 500 mg two times a day   Tolerating well  Sugars 170-200 in mornings  Insulin lantus 30 units  Short acting 10 with meals  Has noticed some looser stools but it is not bothering him  Has tried " to eat smaller portions because he lost so much weight in hospital and wants to keep that weight off    Assessment/Plan:    Saravanan was seen today for diabetes.    Diagnoses and all orders for this visit:    Insulin dependent type 2 diabetes mellitus (H): Doing well on insulin lantus 30 units daily with 10 units short acting humalog with  Meals. He has titrated up to metformin 1000 mg daily- encouraged to titrate up further to 2000 mg daily as tolerated. Advised him that he could take all 4 tablets at once to help with compliance. Reviewed carb portions- he will return for clinic visit for labs, health maintenance update.    COVID-19: Recovering well, symptoms much improved. Refilled tessalon perles for cough. Encouraged increasing activity. His employer unfortunately requires two negative tests prior to going back to work- he has had 1 negative test last weekend and has one scheduled this weekend as well. Wrote letter that he is safe to resume work on 8/31/20 without restrictions and emailed to patient per his request.  -     benzonatate (TESSALON) 200 MG capsule; Take 1 capsule (200 mg total) by mouth 3 (three) times a day as needed for cough.      RTC in 1 month- FTF- scheduled this visit    Phone call duration:  12 minutes    Kelly Watt MD

## 2021-06-10 NOTE — PROGRESS NOTES
"Clinic Care Coordination Contact    Situation: Referral from [facility] COVID-19 discharge list.     Background: Patient at [facility] and [facility] on 8/4/20 to 8/9/20. Newly diagnosed DM. No PCP.     Assessment: Spoke with patient today. Patient stated he is feeling \"better\", continues to have residual cough. He will  Tessalon capsules from the pharmacy today. He stated he does experience some shortness of breath after coughing at times. Instructed patient to go to the ED is shortness of breath continues or worsens. Patient taking his medications as directed. He was able to state the correct dosages of his insulin during phone visit. He stated he is checking his blood glucose levels 3 times a day. Discussed with patient the need for good follow up care with PCP. Patient agreed to establish care with the [facility] and was scheduled with a virtual visit with Dr. Watt today. Reviewed COVID-19 precautions and AVS from [facility]. Patient verbalized understanding of information shared. Discussed Clinic Care Coordination program with patient. He said she did not think he needed this program currently     Plan/Recommendations: No further CCC outreaches at his time.           "

## 2021-06-10 NOTE — PROGRESS NOTES
"Saravanan Park is a 25 y.o. male who is being evaluated via a billable telephone visit.      The patient has been notified of following:     \"This telephone visit will be conducted via a call between you and your physician/provider. We have found that certain health care needs can be provided without the need for a physical exam.  This service lets us provide the care you need with a short phone conversation.  If a prescription is necessary we can send it directly to your pharmacy.  If lab work is needed we can place an order for that and you can then stop by our lab to have the test done at a later time.    Telephone visits are billed at different rates depending on your insurance coverage. During this emergency period, for some insurers they may be billed the same as an in-person visit.  Please reach out to your insurance provider with any questions.    If during the course of the call the physician/provider feels a telephone visit is not appropriate, you will not be charged for this service.\"    Patient has given verbal consent to a Telephone visit? Yes    What phone number would you like to be contacted at? transferred    Patient would like to receive their AVS by: declines    Additional provider notes:   How are you feeling today? Better  In the past 24 hours have you had shortness of breath when speaking, walking, or climbing stairs? My breathing issues have improved  Do you have a cough? Yes, I have a cough but it's not worse  When is the last time you had a fever greater than 100? When he was in hospital, 8/8/20.  Are you having any other symptoms? Loss of appetite and Fatigue   Do you have any other stressors you would like to discuss with your provider? OTHER: mom is hospitalized at [facility] now with Covid-19 pneumonia    Works in [place].  First day of symptoms- 7/29/20- cough and fever and headache  7/31- Covid-19 positive  8/4- went to ED and required oxygen (up to 2L) and discharged " 8/9/20  Needs letter for work  Younger brother and nephew had Covid-19  Mom is hospitalized at [facility]- on 6 L oxygen    History of prediabetes- had borderline A1C  During hospitalization A1C was elevated- diagnosed diabetes   Family history of diabetes- mother, brother  Taking glargine 40 units at bedtime  Humalog 10 units with meals  Had a few oranges 1 hour ago-  (checked while on phone)  360 lb down to 313 lb on discharge.  He feels he knows what to eat and is not interested in referral to diabetes education.      Assessment/Plan:    Saravanan was seen today for hospital visit follow up.    Diagnoses and all orders for this visit:    Type 2 diabetes mellitus with hyperglycemia: Newly diagnosed during hospitalization for Covid-19. A1C 13.7. He was started on insulin- taking glargine 40 units at bedtime and 10 units short-acting with meals. He has not been checking his sugar regularly but has glucometer at home- he checked while we were on the phone. Encouraged him to start metformin in 1 week- I did review his labs and his renal function was normal- and titrate up to 1000 mg two times a day. He declined referral to diabetes education- reviewed dietary and exercise recommendations. He obviously has poor appetite and exercise tolerance currently due to recovering from Covid-19. I discussed that we can start oral medications with the hope of titrating down insulin and maybe even off insulin. He did lose significant amount of weight during hospitalization- of note, he was on steroids- so he will check fasting sugars daily as he starts metformin and I discussed how to titrate down on insulin if needed based on fasting numbers. Continue prandial insulin. He will need face to face visit in 1 month for A1C, labs, microalbumin, foot exam.  -     metFORMIN (GLUCOPHAGE XR) 500 MG 24 hr tablet; Take 1 tablet by mouth twice daily for 7 days, then increase to 2 tablets by mouth twice daily    Morbid obesity (H): Lost  significant amount of weight due to hospitalization for Covid-19. Reviewed importance of continuing to focus on healthy eating, exercise, once he is able following this acute illness.    COVID-19  Pneumonia due to 2019 novel coronavirus: Reviewed discharge summary- he required up to 2L oxygen while hospitalized. Overall doing well and his cough and shortness of breath has slightly improved from discharge but still with poor appetite, poor activity tolerance and fatigue. Offered support reassuring patient that recovery will be a process- I did write letter for his employer to remain off work until 8/21- will set up visit in 1 week virtual to reassess patient's status.      Phone call duration:  36 minutes    Kelly Watt MD

## 2021-06-11 NOTE — PROGRESS NOTES
"SUBJECTIVE  Saravanan Park is a 25 y.o. male here for:    DM type 2  Fasting AM- 150-230  Depends on what he eats the night before  Not checking other times of day  Quit drinking pop and juice. Ice cream- likes to eat.  Eats 2-3 meals.  Lots weight after being in hospital for covid-19- gained back about 5 pounds  Trying to be healthier and eat better.   30 units lantus daily  10 units with meals  Metformin 2000 mg daily- some looser stools.  Has not had eye exam.    Due for Td, flu, HPV- okay with all these today    Wants referral to eye doctor    ROS  Complete 10 point review of systems negative except as noted above in HPI    Reviewed Past Medical History, Medications, Family History and Social History in Epic and up to date with no new changes.    OBJECTIVE  /84 (Patient Site: Right Arm, Patient Position: Sitting, Cuff Size: Adult Large)   Pulse 94   Temp 99.4  F (37.4  C) (Oral)   Ht 5' 9\" (1.753 m)   Wt (!) 320 lb (145.2 kg)   SpO2 97%   BMI 47.26 kg/m       General: Cooperative, pleasant, in no acute distress  CV: RRR, normal S1/S2, no murmur, rubs, gallops  Resp: No respiratory distress. Clear to auscultation bilaterally. No wheezes, rales, rhonchi  Diabetic foot exam: normal DP and PT pulses, no trophic changes or ulcerative lesions, normal sensory exam and normal monofilament exam. R foot with plantar wart      LABS & IMAGES   Results for orders placed or performed in visit on 09/18/20   HM2(CBC w/o Differential)   Result Value Ref Range    WBC 4.9 4.0 - 11.0 thou/uL    RBC 5.28 4.40 - 6.20 mill/uL    Hemoglobin 15.3 14.0 - 18.0 g/dL    Hematocrit 46.1 40.0 - 54.0 %    MCV 87 80 - 100 fL    MCH 28.9 27.0 - 34.0 pg    MCHC 33.2 32.0 - 36.0 g/dL    RDW 12.6 11.0 - 14.5 %    Platelets 209 140 - 440 thou/uL    MPV 8.8 7.0 - 10.0 fL   Glycosylated Hemoglobin A1c   Result Value Ref Range    Hemoglobin A1c 9.3 (H) <=5.6 %         ASSESSMENT/PLAN:   Saravanan was seen today for follow-up.    Diagnoses and " all orders for this visit:    Insulin dependent type 2 diabetes mellitus (H): Diagnosed during hospitalization for Covid-19- he has been doing better on insulin. Has titrated up to 2 g daily of metformin. He is tolerating okay- having some looser stools but tolerating. Foot exam done today. Referred for eye exam. Reviewed importance of glycemic control to prevent kidney problems, heart disease and vision/neuropathy. He is taking insulin 30 units lantus and 10 units with meals. Repeat A1C today- his fasting #'s are elevated and he has not been checking at other times- will possibly add another oral agent and try to down titrate on insulin to help with weight loss as well. Check labs, urine.  -     HM2(CBC w/o Differential)  -     Comprehensive Metabolic Panel  -     Lipid Cascade RANDOM  -     Glycosylated Hemoglobin A1c  -     Microalbumin, Random Urine  -     insulin lispro (HUMALOG KWIKPEN) 100 unit/mL pen; Inject 10 Units under the skin 3 (three) times a day with meals. Check blood sugar four (4) times daily.  11.65 Type 2 with hyperglycemia  BD Ultra-fine Maya Pen Needles - NDC [address]-09 - dispense 1 case, refill PRN for 1 year  Accu-chek Guide test strips (50 ct. boxes) - dispense 1, refill PRN for 1 year  Accu-chek FastClix lancets (box of 102 ct.) - dispense 1, refill PRN for 1 year  -     LANTUS SOLOSTAR U-100 INSULIN 100 unit/mL (3 mL) pen; Inject 30 Units under the skin at bedtime. 11.65 Type 2 with hyperglycemia  Contact provider if insulin prescribed is not the preferred insulin per insurance.  -     Ambulatory referral to Ophthalmology    Need for immunization against influenza  -     Influenza, Seasonal Quad, PF =/> 6months    Hyperlipidemia, unspecified hyperlipidemia type: Check lipids.    Morbid obesity (H): Reviewed importance of maintaining healthy weight to help manage his diabetes.    History of 2019 novel coronavirus disease (COVID-19): required hospitalization and O2- now doing well. Cough  resolved. Lungs clear. Back to work and exercise tolerance improved.    Plantar warts: Advised OTC treatment- he will check with insurance to see cost of cryotherapy here in clinic.    Other orders  -     Td, Preservative Free (green label)  -     HPV vaccine 9 valent 3 dose IM        Follow-up in 3 months, repeat A1C.       Options for treatment and follow-up care were reviewed with the patient. Saravanan Park and/or guardian was engaged and actively involved in the decision making process. Coe HIMANSHU ShepardVivian and/or guardian verbalized understanding of the options discussed and was satisfied with the final plan.      Kelly Watt MD

## 2021-06-11 NOTE — TELEPHONE ENCOUNTER
Forms Request  Name of form/paperwork: Other:  Unemployment form  Have you been seen for this request: Yes:  8/25/20  Do we have the form: No  When is form needed by: as soon as possible   How would you like the form returned: Fax:  N/A  Patient Notified form requests are processed in 3-5 business days: Yes    Okay to leave a detailed message? Yes

## 2021-06-11 NOTE — TELEPHONE ENCOUNTER
----- Message from Kelly Watt MD sent at 9/21/2020  2:49 PM CDT -----  Attempted to call. No answer. Please call patient and let him know his A1C was much better- he should continue the insulin and metformin and we will recheck this A1C number in 3 months. His kidney function tests and cholesterol all looked good. His liver showed some mild inflammation (but much improved from 1 month ago)- weight loss and healthy eating and avoiding alcohol can continue to keep his liver healthy.

## 2021-06-12 NOTE — PROGRESS NOTES
"HPI: Saravanan Park is a 25 y.o. male referred to see me by Kelly Watt MD for a lump in the perianal region.  He notes  a several year history of the lumps which she describes as intermittently draining abscesses.  He states they are on both sides.  He does note that when he tries to pass flatus he has extra air passing through the chest cavity. He denies any nausea, vomiting, fevers, chills or any other symptoms at present.       Allergies:Patient has no known allergies.    Past Medical History:   Diagnosis Date     Diabetes (H)      History of 2019 novel coronavirus disease (COVID-19) 9/18/2020    Require hospitalization, no intubation.     Hyperlipidemia      Morbid obesity (H) 8/12/2020     Obesity      Vitamin D deficiency     Created by Conversion  Replacement Utility updated for latest IMO load       Past Surgical History:   Procedure Laterality Date     DE KNEE SCOPE,DIAGNOSTIC      Description: Arthroscopy Knee Right;  Proc Date: 09/23/2010;  Comments: football injury on 9/7/10     right hand surgery  2015       CURRENT MEDS:    Current Outpatient Medications:      ACCU-CHEK GUIDE ME GLUCOSE MTR Misc, Use As Directed., Disp: , Rfl:      ACCU-CHEK GUIDE TEST STRIPS strips, USE TO TEST BG QID, Disp: , Rfl:      BD ULTRA-FINE MAYA PEN NEEDLE 32 gauge x 5/32\" Ndle, USE WITH INSULIN QID, Disp: , Rfl:      insulin lispro (HUMALOG KWIKPEN) 100 unit/mL pen, Inject 10 Units under the skin 3 (three) times a day with meals. Check blood sugar four (4) times daily. 11.65 Type 2 with hyperglycemia BD Ultra-fine Maya Pen Needles - NDC [address]-71 - dispense 1 case, refill PRN for 1 year Accu-chek Guide test strips (50 ct. boxes) - dispense 1, refill PRN for 1 year Accu-chek FastClix lancets (box of 102 ct.) - dispense 1, refill PRN for 1 year, Disp: 9 mL, Rfl: 11     LANTUS SOLOSTAR U-100 INSULIN 100 unit/mL (3 mL) pen, Inject 30 Units under the skin at bedtime. 11.65 Type 2 with hyperglycemia Contact provider if " insulin prescribed is not the preferred insulin per insurance., Disp: 9 mL, Rfl: 11     metFORMIN (GLUCOPHAGE XR) 500 MG 24 hr tablet, Take 1 tablet by mouth twice daily for 7 days, then increase to 2 tablets by mouth twice daily, Disp: 120 tablet, Rfl: 11    Family History   Problem Relation Age of Onset     Diabetes Mother      Hypertension Father      Diabetes Brother         reports that he is a non-smoker but has been exposed to tobacco smoke. He has never used smokeless tobacco. He reports current alcohol use. He reports that he does not use drugs.    Review of Systems:  The 12 system review is within normal limits except for as mentioned above.  General ROS: No complaints or constitutional symptoms  Ophthalmic ROS: No complaints of visual changes  Skin: As per HPI  Endocrine: No complaints or symptoms  Hematologic/Lymphatic: No symptoms or complaints  Psychiatric: No symptoms or complaints  Respiratory ROS: no cough, shortness of breath, or wheezing  Cardiovascular ROS: no chest pain or dyspnea on exertion  Gastrointestinal ROS: No complaints of pain or other symptoms  Genito-Urinary ROS: no dysuria, trouble voiding, or hematuria  Musculoskeletal ROS: no joint or muscle pain  Neurological ROS: no TIA or stroke symptoms      EXAM:  There were no vitals taken for this visit.  GENERAL: Well developed male, No acute distress, pleasant and conversant   EYES: Pupils equal, round and reactive, no scleral icterus  CARDIAC: Regular rate and rhythm, no obvious murmurs noted  CHEST/LUNG: Clear to ascultation bilaterally, No ronchi, No wheezes  ABDOMEN: Soft, non tender, no masses  SKIN: Pink, warm and dry  Perianal region-left-sided draining sinus at the 9 o'clock position of the perianal region with evidence of previous abscesses in the perianal region just anteriorly  NEURO:No focal deficits, ambulatory  MUSCULOSKELETAL:No obvious deformities, no swelling, normal appearing      LABS:  Lab Results   Component Value  Date    WBC 4.9 09/18/2020    HGB 15.3 09/18/2020    HCT 46.1 09/18/2020    MCV 87 09/18/2020     09/18/2020     INR/Prothrombin Time      Lab Results   Component Value Date    ALT 42 09/18/2020    AST 42 (H) 09/18/2020    ALKPHOS 40 (L) 09/18/2020    BILITOT 0.8 09/18/2020         Assessment/Plan:   Saravanan Park is a 25 y.o. 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  I have explained the pathophysiology of fistulous in detail as well as the surgical versus non-operative management strategies.      The risks of surgery were discussed in detail which include, but are not limited to, bleeding, infection, blood clots, stroke, heart attack and death.  Additionally, the risks of non operative management were discussed which include, but are not limited to,enlargin of the mass, infection and pain.      He understands everything which was discussed and has consented to proceed with an examination under anesthesia and possible seton placement possible fistulotomy      Singh Silverio D.O. [facility]  [phone]  [facility] Department of Surgery

## 2021-06-12 NOTE — TELEPHONE ENCOUNTER
Called and discussed with RN. Discontinue eliquis. Metformin should be 500 mg (take two tablets twice daily). Reviewed his insulin doses- he is taking lantus 40 units daily with 10 units lispro with meals. Will place diabetes education referral. She will also give him # again for bariatric medicine that he was referred to by Dr West at last appointment. Follow-up December for A1C recheck. Continue checking sugars four times a day.

## 2021-06-12 NOTE — TELEPHONE ENCOUNTER
Spoke with Saravanan and went over surgery details:    We've received instruction to get you scheduled for surgery with Dr Silverio. We have that arranged as follows:     Surgery Date: Tuesday November 17th    Location: [facility]                 3rd Floor, [facility]                  [address] [place] [id number]     Arrival Time: Approximately 12:30 PM (unless instructed otherwise by the pre-op nurse)    Prep:     1. Dr. Silverio will do pre-op the day of surgery.    2. COVID19 testing is required within 4 days of surgery. We have [facility] scheduled [facility], [address] [place] [address] on Saturday Nov.14th at 11:50 AM. Follow the specific instructions you receive by Nicolle. If your test is positive, your surgery will be canceled.     3. Nothing to eat or drink for 8 hours before surgery unless instructed differently by the preop nurse.    4. No blood thinners including aspirin for one week prior to surgery. Verify this is safe for you with your primary care doctor before stopping.     5. You need an adult to drive you home and stay with you 24 hours after surgery. Because of COVID19 related visitor restrictions, surgical patients are allowed one visitor only during the preoperative phase of surgery.    6. When you arrive to the facility, you will be screened for COVID19 symptoms. If you screen positive, your surgery will need to be postponed for your safety.    7. If the community sees a new surge in COVID19 hospital admissions, your procedure may need to be postponed. We will contact you if this happens.    8. We always encourage you to notify your insurance any time you have something scheduled including surgery. The number is usually right on the back of your insurance card.     Post op 11/30/2020 with Dr. Singh Silverio at 9:00 AM    Call our office if you have any questions! Thank you!     Glory Magaña  Surgery Scheduler  LILY  [facility]  Direct line: [phone]   Main Line: [phone]

## 2021-06-12 NOTE — TELEPHONE ENCOUNTER
Who is calling:  Brian Bass RN Nurse   Reason for Call:  Calling to report she has multiple concerns regarding this patient. Patient reports -    Not taking his medications correctly and or missing doses.    Reports patient is not checking blood sugar as ordered, patient reports he is only checking his blood sugars before breakfast.     Patient reports he is still taking Eliquis.  Patient reports he is taking his insulin but feels he is to be titering this because Metformin was added.    Would this patient be a candidate for Diabetic education?    Reports it can be hard unless you call after 3 pm because patient is working.  Please advise and call nurse .  Date of last appointment with primary care: 9/29/2020  Okay to leave a detailed message: Yes

## 2021-06-13 NOTE — PROGRESS NOTES
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  He had a recent exam under anesthesia with seton placement.  He is doing well with no complaints of pain.  Allergies, Medications, Social History, Past Medical History and Past Surgical History were reviewed and are noted in the chart.    There were no vitals taken for this visit.  There is no height or weight on file to calculate BMI.      EXAM:   GENERAL: Appears well  Perianal region-left-sided seton in place with no surrounding stigmata of infection.  Draining sinus on the right perianal region but no stigmata of infection    Drain Left Buttock  (Active)       Incision 11/17/20 Surgical Rectum Bilateral (Active)   Dressing Mesh panties 11/17/20 1600       Assessment/Plan: Saravanan Park continues to do well.  I discussed the pathophysiology of rectocutaneous fistulas as well as anal fistulas.  He does have a seton in place which is controlling the drainage.  At this point I will for him to colorectal surgery for evaluation and treatment of his fistulas.  He may have a second fistula developing on the right perianal region as well.    Singh Silverio DO [facility] Department of Surgery

## 2021-06-13 NOTE — PROGRESS NOTES
I left a message for Saravanan regarding the recommendation from Dr. Watt to start Trulicity.  I let him know the prescription had been sent to his pharmacy and we did use a voucher from [employer] so there would no charge to him for this month's supply.  I asked him to review the use of the Trulicity pen with the pharmacist when he picks it up and discussed that this is a once per week injection.  He will follow up with his MD On 12/16/20.  I will plan on following up with Saravanan after his MD visit.     Candi Loza

## 2021-06-13 NOTE — ANESTHESIA PREPROCEDURE EVALUATION
Anesthesia Evaluation        Airway   Mallampati: II  Neck ROM: full   Pulmonary - normal exam   (+) pneumonia,                          Cardiovascular - negative ROS and normal exam   Neuro/Psych      Endo/Other    (+) diabetes mellitus,      GI/Hepatic/Renal - negative ROS           Dental - normal exam                        Anesthesia Plan  Planned anesthetic: MAC    ASA 3   Induction: intravenous   Anesthetic plan and risks discussed with: patient    Post-op plan: routine recovery

## 2021-06-13 NOTE — TELEPHONE ENCOUNTER
Spoke with patient; he was still at work and had forgotten about diabetes education appointment and wanted to r/s. He would prefer a late afternoon appointment (at/after 3 pm). Please call patient to schedule the initial 60 minute consult.     Thanks  Vibha Hastings RDN, LD  Diabetes Care and Education

## 2021-06-13 NOTE — PROGRESS NOTES
"SUBJECTIVE  Saravanan Park is a 25 y.o. male here for:    Chief Complaint   Patient presents with     Diabetes     DM type 2  Has established with diabetes education  On metformin 2 g daily  Was prescribed insulin lantus 30 units daily and insulin short acting 10 units with meals after hospitalization  Has not taken insulin since hospital. He did pick it up but never took a dose.  He was nervous to tell his care providers this and wanted to wait until his A1C came back  Prescribed trulicity 0.7 mg weekly a few weeks ago but has not yet picked up  Lost significant amount of weight in August after being diagnosed and hospitalized with Covid-19  Has gained 16 lbs since last visit in September  He is trying to \"lay off sweets\" but finds it difficult, especially with ice cream  He has # to bariatric medicine- he wants to get his diabetes under control and then he does want to call and schedule appointment- he is considering surgical options in the future/gastric bypass  Had eye exam 10/8/20  Last A1C 9.3 9/18/20  Fasting 140-150- does not check regularly.   He reports drinking 8-10 beers on the weekends sometimes with his brothers- he reports his family are big drinkers. Denies any withdrawal symptoms and does not drink during the week. He is concerned about his liver.    ROS  Complete 10 point review of systems negative except as noted above in HPI    Reviewed Past Medical History, Medications, Family History and Social History in Epic and up to date with no new changes.    OBJECTIVE  /85 (Patient Site: Right Arm, Patient Position: Sitting, Cuff Size: Adult Large)   Pulse (!) 104   Temp 99.2  F (37.3  C) (Oral)   Ht 5' 9\" (1.753 m)   Wt (!) 336 lb (152.4 kg)   SpO2 97%   BMI 49.62 kg/m       General: Cooperative, pleasant, in no acute distress  CV: RRR, normal S1/S2, no murmur, rubs, gallops  Resp: No respiratory distress. Clear to auscultation bilaterally. No wheezes, rales, rhonchi    LABS & IMAGES "   Results for orders placed or performed in visit on 12/16/20   Glycosylated Hemoglobin A1c   Result Value Ref Range    Hemoglobin A1c 7.0 (H) <=5.6 %       ASSESSMENT/PLAN:   Saravanan was seen today for diabetes.    Diagnoses and all orders for this visit:    Type 2 diabetes mellitus with hyperglycemia, unspecified whether long term insulin use (H): A1C 7.0 today- his medication list had insulin lantus 30 units and mealtime insulin 10 units and he was recently prescribed trulicity- however, he reports that he never has used any of these medications since being discharged- he was using metformin but stopped about 1 month ago. Based on this new information- I removed the insulin and trulicity off his list- to avoid hypoglycemia I would not recommend restarting at this time. Encouraged him to restart metformin 2 g daily (he took 4 pills in the morning previously) and to return in 3 months for another A1C check. If elevated, then would consider GLP-1. Encouraged patient to continue working on diet, exercise- he follows with diabetes education and has # to call and a referral to bariatric medicine- he is considering surgical options for weight management in the future but wants to focus on controlling his diabetes. I also expressed concern about his binge-type drinking behaviors on the weekends- he also had slightly elevated liver enzyme on his blood work- denies history of withdrawal and does not feel he needs any resources for treatment- he mostly drinks with his brothers socially- reviewed appropriate amounts of EtOH to avoid long-term damage to his body- he expressed understanding.   -     Glycosylated Hemoglobin A1c        Follow-up in 3 months for diabetes check      Options for treatment and follow-up care were reviewed with the patient. Saravanan Park and/or guardian was engaged and actively involved in the decision making process. Saravanan Park and/or guardian verbalized understanding of the options discussed and  was satisfied with the final plan.      Kelly Watt MD

## 2021-06-13 NOTE — PROGRESS NOTES
Diabetes Educator has received verbal consent for a telephone visit for the patient./ 45 minutes      Assessment:   --initial education for Diabetes with Saravanan today.  --patient stated that he has made some dietary changes since finding out he had diabetes: he cut out his energy drinks which use to be 1-2x/daily, plus reduced juice and soda intake to 1-2x/week.  --he stated his appetite has returned, but he finds that he is eating too much, he would like to lose weight  --he has not plan for exercise, he is a member of a gym and would like to return when they open again.   --two meals/day:  Rice-2-3 scoops, protein source, sometimes vegetables  Snacks: rice  Beverages: water, zero vitamin water  ETOH on one day of the weekend: 9-10 light beers, plus two shots  - no tobacco use    Blood glucose record: November 2020, not consistent with checking, 11/1-11/15 no SMBG  Educator did not see the meter  FBS: 150,133,126,132,121,156  Post meal: 154,160    Medications prescribed:  XR Metformin 500 mg (2) twice daily( misses 1-2x/week, patient was taking six tablets/day, reviewed correct prescribed dose)  Lantus 30 units/daily, misses 1-2x/week  Humalog 10 units 1-2x/day for meals    Education:  --reviewed BG record, goals for BG, and A1C, SMBG, testing times and importance of consistent testing when on insulin and in order to make adjustments.  --discussed insulin injection process, storage/expiration, site rotation  --reviewed intake: discussed CHO foods, cutting portions by 30-50%, adding more protein/vegetables, hydration with water  --discussed ETOH intake, risk for hypoglycemia, impact of calories on weight mgmt  --disc exercise benefits,     Plan:   1. Test glucose twice daily: fasting and two hours post meal.  2. Take Humalog 10-15 minutes prior to meal time.  3. Have a CHO snack on nights when ETOH is consumed.  4. Cut rice/noodle portions by 30-50% at meals, add more protein and vegetables to meals.   5. Eliminated  sugary beverages: soda/juice.  6. Follow up with MD On 20  7. Educator to consult with MD re: starting a GLP1, if started and next A1c is < 8%, basal insulin would need to be decreased by 20%.     Subjective and Objective:      Saravanan Park is referred by Kelly Watt for Diabetes Education.     Lab Results   Component Value Date    HGBA1C 9.3 (H) 2020                Education:     Monitoring   Meter (per above goals): Assessed, Discussed and Literature provided  Monitoring: Assessed, Discussed and Literature provided  BG goals: Assessed, Discussed and Literature provided    Nutrition Management  Nutrition Management: Assessed, Discussed and Literature provided  Weight: Assessed and Discussed  Portions/Balance: Assessed, Discussed and Literature provided  Carb ID/Count: Not addressed  Label Reading: Not addressed  Heart Healthy Fats: Not addressed  Menu Planning: Assessed and Discussed  Dining Out: Not addressed  Physical Activity: Assessed and Discussed    Orals: Assessed and Discussed  Injected Medications: Assessed and Discussed   Storage/Exp:Assessed and Discussed   Site Rotation: Assessed and Discussed   Sites Assessed: no    Diabetes Disease Process: Assessed, Discussed and Literature provided    Acute Complications: Prevent, Detect, Treat:  Hypoglycemia: Assessed, Discussed and Literature provided  Hyperglycemia: Assessed, Discussed and Literature provided  Sick Days: Not addressed  Driving: Not addressed    Chronic Complications  Foot Care:Not addressed  Skin Care: Not addressed  Eye: Not addressed  ABC: Assessed  Teeth:Not addressed  Goal Setting and Problem Solving: Assessed and Discussed  Barriers: Assessed and Discussed  Psychosocial Adjustments: Assessed      Time spent with the patient: 45 minutes for diabetes education and counseling.   Previous Education: no  Visit Type:DSMT  Hours Remainin, DSMT 9 and MNT 3      Candi Loza  12/3/2020

## 2021-06-13 NOTE — ANESTHESIA POSTPROCEDURE EVALUATION
Patient: Saravanan Park  Procedure(s):  EXAM UNDER ANESTHESIA, seton placement, biopsy of perianal lesion  Anesthesia type: MAC    Patient location: PACU  Last vitals:   Vitals Value Taken Time   /94 11/17/20 1517   Temp 37  C (98.6  F) 11/17/20 1515   Pulse 85 11/17/20 1524   Resp 12 11/17/20 1515   SpO2 96 % 11/17/20 1524   Vitals shown include unvalidated device data.  Post vital signs: stable  Level of consciousness: awake and responds to simple questions  Post-anesthesia pain: pain controlled  Post-anesthesia nausea and vomiting: no  Pulmonary: unassisted, return to baseline  Cardiovascular: stable and blood pressure at baseline  Hydration: adequate  Anesthetic events: no    QCDR Measures:  ASA# 11 - Sparkle-op Cardiac Arrest: ASA11B - Patient did NOT experience unanticipated cardiac arrest  ASA# 12 - Sparkle-op Mortality Rate: ASA12B - Patient did NOT die  ASA# 13 - PACU Re-Intubation Rate: ASA13B - Patient did NOT require a new airway mgmt  ASA# 10 - Composite Anes Safety: ASA10A - No serious adverse event    Additional Notes:

## 2021-06-13 NOTE — ANESTHESIA CARE TRANSFER NOTE
Last vitals:   Vitals:    11/17/20 1515   BP: 145/78   Pulse: 95   Resp: 12   Temp: 37  C (98.6  F)   SpO2: 97%     Patient's level of consciousness is awake  Spontaneous respirations: yes  Maintains airway independently: yes  Dentition unchanged: yes  Oropharynx: oropharynx clear of all foreign objects    QCDR Measures:  ASA# 20 - Surgical Safety Checklist: WHO surgical safety checklist completed prior to induction    PQRS# 430 - Adult PONV Prevention: 4558F - Pt received => 2 anti-emetic agents (different classes) preop & intraop  ASA# 8 - Peds PONV Prevention: NA - Not pediatric patient, not GA or 2 or more risk factors NOT present  PQRS# 424 - Sparkle-op Temp Management: 4559F - At least one body temp DOCUMENTED => 35.5C or 95.9F within required timeframe  PQRS# 426 - PACU Transfer Protocol: - Transfer of care checklist used  ASA# 14 - Acute Post-op Pain: ASA14B - Patient did NOT experience pain >= 7 out of 10

## 2021-06-14 NOTE — ANESTHESIA CARE TRANSFER NOTE
Last vitals:   Vitals:    01/27/21 0927   BP: 165/87   Pulse: (!) 114   Resp: 18   Temp: 36.6  C (97.9  F)   SpO2: 100%     Patient's level of consciousness is drowsy  Spontaneous respirations: yes  Maintains airway independently: yes  Dentition unchanged: yes  Oropharynx: oropharynx clear of all foreign objects    QCDR Measures:  ASA# 20 - Surgical Safety Checklist: WHO surgical safety checklist completed prior to induction    PQRS# 430 - Adult PONV Prevention: 4558F - Pt received => 2 anti-emetic agents (different classes) preop & intraop  ASA# 8 - Peds PONV Prevention: NA - Not pediatric patient, not GA or 2 or more risk factors NOT present  PQRS# 424 - Sparkle-op Temp Management: 4559F - At least one body temp DOCUMENTED => 35.5C or 95.9F within required timeframe  PQRS# 426 - PACU Transfer Protocol: - Transfer of care checklist used  ASA# 14 - Acute Post-op Pain: ASA14B - Patient did NOT experience pain >= 7 out of 10

## 2021-06-14 NOTE — ANESTHESIA POSTPROCEDURE EVALUATION
Patient: Saravanan Park  Procedure(s):  EXAM UNDER ANESTHESIA WITH PARTIAL  FISUTLOTOMY, REVISION SETON, AND PLACEMENT OF SETON, EXCISION THIGH SKIN TAGS  Anesthesia type: general    Patient location: PACU  Last vitals:   Vitals Value Taken Time   /95 01/27/21 1001   Temp 36.8  C (98.2  F) 01/27/21 1000   Pulse 96 01/27/21 1003   Resp 29 01/27/21 1002   SpO2 97 % 01/27/21 1003   Vitals shown include unvalidated device data.  Post vital signs: stable  Level of consciousness: awake and responds to simple questions  Post-anesthesia pain: pain controlled  Post-anesthesia nausea and vomiting: no  Pulmonary: unassisted, return to baseline  Cardiovascular: stable and blood pressure at baseline  Hydration: adequate  Anesthetic events: no    QCDR Measures:  ASA# 11 - Sparkle-op Cardiac Arrest: ASA11B - Patient did NOT experience unanticipated cardiac arrest  ASA# 12 - Sparkle-op Mortality Rate: ASA12B - Patient did NOT die  ASA# 13 - PACU Re-Intubation Rate: ASA13B - Patient did NOT require a new airway mgmt  ASA# 10 - Composite Anes Safety: ASA10A - No serious adverse event    Additional Notes:

## 2021-06-14 NOTE — PROGRESS NOTES
[address]  [facility] [address]  Dept: [phone]  Dept Fax: [phone]  Primary Provider: Kelly Watt MD  Pre-op Performing Provider: KELLY WATT    PREOPERATIVE EVALUATION:  Today's date: 1/22/2021    Saravanan Park is a 25 y.o. male who presents for a preoperative evaluation.    Surgical Information:  Surgery/Procedure: Seton replacement  Surgery Location: [facility]  Surgeon: Antione Kwok   Surgery Date: 01/27/2021  Time of Surgery: unsure  Where patient plans to recover: At home with family  Fax number for surgical facility: unsure    Type of Anesthesia Anticipated: to be determined    Subjective     HPI related to upcoming procedure:     History of anal fistula- he had seton placement but needs replacement and is here today for preoperative visit. No other concerns.     Preop Questions 1/22/2021   Have you ever had a heart attack or stroke? No   Have you ever had surgery on your heart or blood vessels, such as a stent placement, a coronary artery bypass, or surgery on an artery in your head, neck, heart, or legs? No   Do you have chest pain with activity? No   Do you have a history of  heart failure? No   Do you currently have a cold, bronchitis or symptoms of other infection? No   Do you have a cough, shortness of breath, or wheezing? No   Do you or anyone in your family have previous history of blood clots? No   Do you or does anyone in your family have a serious bleeding problem such as prolonged bleeding following surgeries or cuts? No   Have you ever had problems with anemia or been told to take iron pills? No   Have you had any abnormal blood loss such as black, tarry or bloody stools? No   Have you ever had a blood transfusion? No   Are you willing to have a blood transfusion if it is medically needed before, during, or after your surgery? Yes   Have you or any of your relatives ever had problems with anesthesia? No   Do you have  sleep apnea, excessive snoring or daytime drowsiness? No   Do you have any artifical heart valves or other implanted medical devices like a pacemaker, defibrillator, or continuous glucose monitor? No   Do you have artificial joints? No   Are you allergic to latex? No         Health Care Directive:  Patient does not have a Health Care Directive or Living Will:       Review of Systems  Constitutional, neuro, ENT, endocrine, pulmonary, cardiac, gastrointestinal, genitourinary, musculoskeletal, integument and psychiatric systems are negative, except as otherwise noted.      Patient Active Problem List    Diagnosis Date Noted     Type 2 diabetes mellitus without complication, without long-term current use of insulin (H) 12/16/2020     Perianal abscess 10/29/2020     History of 2019 novel coronavirus disease (COVID-19) 09/18/2020     Morbid obesity (H) 08/12/2020     Hypokalemia      Hyponatremia 08/05/2020     Vitamin D Deficiency      Hyperlipidemia      Past Medical History:   Diagnosis Date     Diabetes (H)      History of 2019 novel coronavirus disease (COVID-19) 9/18/2020    Require hospitalization, no intubation.     Hyperlipidemia      Morbid obesity (H) 8/12/2020     Obesity      Pneumonia     August 2020     Vitamin D deficiency     Created by Conversion  Replacement Utility updated for latest IMO load     Past Surgical History:   Procedure Laterality Date     MD KNEE SCOPE,DIAGNOSTIC      Description: Arthroscopy Knee Right;  Proc Date: 09/23/2010;  Comments: football injury on 9/7/10     right hand surgery  2015     Current Outpatient Medications   Medication Sig Dispense Refill     ACCU-CHEK GUIDE ME GLUCOSE MTR Misc Use As Directed.       ACCU-CHEK GUIDE TEST STRIPS strips USE TO TEST BG QID       metFORMIN (GLUCOPHAGE XR) 500 MG 24 hr tablet Take 1 tablet by mouth twice daily for 7 days, then increase to 2 tablets by mouth twice daily 120 tablet 11     No current facility-administered medications for this  visit.        No Known Allergies    Social History     Tobacco Use     Smoking status: Passive Smoke Exposure - Never Smoker     Smokeless tobacco: Current User     Tobacco comment: family smokes outside home   Substance Use Topics     Alcohol use: Yes     Comment: weekends, 10 beers on average       Family History   Problem Relation Age of Onset     Diabetes Mother      Hypertension Father      Diabetes Brother      Social History     Substance and Sexual Activity   Drug Use Never        Objective     /82   Pulse 88   Temp 98.7  F (37.1  C) (Oral)   Resp 20   Wt (!) 344 lb 12 oz (156.4 kg)   SpO2 97%   BMI 50.91 kg/m    Physical Exam    GENERAL APPEARANCE: healthy, alert and no distress     EYES: EOMI,  PERRL     HENT: ear canals and TM's normal and nose and mouth without ulcers or lesions     NECK: thick neck, no adenopathy, no asymmetry, masses, or scars and thyroid normal to palpation     RESP: lungs clear to auscultation - no rales, rhonchi or wheezes     CV: regular rates and rhythm, normal S1 S2, no S3 or S4 and no murmur, click or rub     ABDOMEN:  soft, nontender, no HSM or masses and bowel sounds normal     MS: extremities normal- no gross deformities noted, no evidence of inflammation in joints, FROM in all extremities.     SKIN: no suspicious lesions or rashes     NEURO: mentation intact and speech normal     PSYCH: mentation appears normal. and affect normal/bright     LYMPHATICS: No cervical adenopathy    Recent Labs   Lab Test 09/18/20  1650 08/07/20  1147 08/06/20  0535 08/06/20  0535   HGB 15.3 14.9   < > 14.4    174   < > 118*   INR  --  0.97  --  0.99    133*   < > 135*   K 4.2 3.9   < > 3.1*   CREATININE 0.81 0.83   < > 0.81    < > = values in this interval not displayed.        PRE-OP Diagnostics:   Labs pending at this time. Results will be reviewed when available.  No EKG required for low risk surgery (cataract, skin procedure, breast biopsy, etc).    REVISED CARDIAC  RISK INDEX (RCRI)   The patient has the following serious cardiovascular risks for perioperative complications:   - Diabetes Mellitus (on Insulin) = 1 point    RCRI INTERPRETATION: 1 point: Class II (low risk - 0.9% complication rate)         Assessment & Plan      The proposed surgical procedure is considered LOW risk.    Saravanan was seen today for pre-op exam.    Diagnoses and all orders for this visit:    Type 2 diabetes mellitus without complication, without long-term current use of insulin (H): On metformin- last A1C was at goal- will recheck in March. He takes metformin inconsistently due to side effects of loose stools- encouraged to slowly titrate up to avoid side effects, with goal of 2 g daily.  -     HM2(CBC w/o Differential)  -     Basic Metabolic Panel    Preoperative examination: Planning for seton replacement.  -     HM2(CBC w/o Differential)  -     Basic Metabolic Panel    Morbid obesity (H): He does have referral for bariatric medicine and plans to schedule appointment. Reviewed recommendation for decreasing his EtOH use. He does note snoring- has tolerated anesthesia for seton placement recently, but I did place referral for sleep study that can be done postoperatively.  -     Ambulatory referral to Sleep Medicine        Risks and Recommendations:  The patient has the following additional risks and recommendations for perioperative complications:   - Morbid obesity (BMI >40)        RECOMMENDATION:  APPROVAL GIVEN to proceed with proposed procedure, without further diagnostic evaluation.    Signed Electronically by: Kelly Watt MD    Copy of this evaluation report is provided to requesting physician.    [facility] Guidelines    Revised Cardiac Risk Index

## 2021-06-14 NOTE — ANESTHESIA PREPROCEDURE EVALUATION
Anesthesia Evaluation      Patient summary reviewed     Airway   Mallampati: II  Neck ROM: full   Pulmonary - normal exam   Pneumonia: resolved.                         Cardiovascular - negative ROS and normal exam  Exercise tolerance: > or = 4 METS   Neuro/Psych      Endo/Other    (+) diabetes mellitus (A1c=7) type 2 well controlled, obesity (BMI>50),      GI/Hepatic/Renal - negative ROS      Other findings: Results for CLAUDIA ORELLANA (MRN [id number]) as of 1/27/2021 07:18    1/24/2021 13:33  SARS-CoV-2 Virus Specimen Source: Nasopharyngeal  SARS-CoV-2 PCR Result: NEGATIVE        Dental - normal exam                          Anesthesia Plan  Planned anesthetic: general endotracheal  GAETT  Antiemetics  Local by surgeon  ASA 3   Induction: intravenous   Anesthetic plan and risks discussed with: patient  Anesthesia plan special considerations: antiemetics,   Post-op plan: routine recovery

## 2021-06-15 NOTE — TELEPHONE ENCOUNTER
Central PA team  [phone]  Pool: HE PA MED ([id number])          PA has been initiated.       PA form completed and faxed insurance via Cover My Meds     Key:   [id number]  Rx #: [id number]     Medication:  Victoza 18MG/3ML pen-injectors    Insurance:  [employer]        Response will be received via fax and may take up to 5-10 business days depending on plan

## 2021-06-15 NOTE — PROGRESS NOTES
"New Bariatric Surgery Consultation Note    2021    RE: Saravanan Park  MR#: [id number]  : 1995      Referring provider:   BAR REFERRING PROVIDER 2021   Who referred you? Doctor ALEJO West       Chief Complaint/Reason for visit: evaluation for possible weight loss surgery    Dear Kelly Watt MD,    I had the pleasure of seeing your patient, Saravanan Park, to evaluate his obesity and consider him for possible weight loss surgery. As you know, Saravanan Park is 25 y.o..  He has a height of Height: 5' 9\" (1.753 m)  , a weight of   Vitals:    21 1414   Weight: (!) 344 lb 8 oz (156.3 kg)   , and calculated Body mass index is 50.87 kg/m .  He was diagnosed with type II diabetes last August when contracted severe case of COVID and was hospitalized with numerous inflammatory markers elevated/pneumonia. He's recovered well and since starting treatment with Metformin has improved his A1c from the 14 range down to 7% on recent check last month. He's dealing with some perianal fistulas that are actively draining/wounds healing and so will need to wait on proceeding to surgery until those have healed better. Improved glycemic control should help greatly.    He was interested in a season of medication supported weight loss as he prepares for the surgical tool and I'd like to see his weight under 330 lbs before clearing him to proceed to surgery. We'll start a trial of phentermine: 18.75mg/day and I think his metabolic syndrome/diabetes and morbid obesity would be well served with GLP1 agonist compliment to his metformin.  We'll have a PA for Victoza sent and hopefully start that a week or so from now if approved.     He has recovered from some achilles tendon issues recently and I've encouraged some ROM exercises and if pain returns PT referral would be appropriate. Unclear if any med triggers or just weight related/deconditioning.     He has had some nicotine use and we'll need to avoid that for life due to " ulcer risks. Recommend nicotine check 3 months after quit date.    Plan:  1. Welcome to the Bariatric Program. You were interested in some preoperative non surgical weight loss/medicaiton supported efforts while proceeding to learn about and get clearance for the surgical tool. To start, aim for 3 meals daily with 25-33 grams of lean protein per meal every 4-5 hours. Hydrating well in between meals with 80 oz of water daily and mindful eating strategies to feel in control of appetite and follow up with our dietician for further education/help. To curb appetite trial of phentermine:  Start Half a tablet after breakfast but no closer than 12 hours before your desired bedtime (18.75mg).   Don't use if overly stimulated, anxious/insomnia/racing heart beats/mood swings and don't mix with cold medications or other stimulants.    2. Weight should be below 330 lbs before clearing you.    3. 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 Kwok.    4. We'll try to get Victoza covered this week. If covered, start 0.6mg injected daily and then week two increase to 1.2mg injected daily if tolerated. If doing well, week three increase to 1.8mg injected daily (top dose) and stay on that until our follow up. Stop if any abdominal pains/nausea/vomiting and let me know. Continue metformin. Last A1c was looking better but continued control will be important for skin wounds/fistula healing.     5. Referral for sleep study given high risk for sleep apnea based on neck and airway size/BMI.    6. Check c-peptide today to see how response to weight loss your diabetes will be. Likely very given recent diagnosis last summer.    7. Recheck ferritin levels as I suspect your severe illness/COVID caused high elevations in August.  If persisting elevations, we may consider hematology follow up.    8. Attend support group at least once this spring.    9. Anticipate actigall use  after surgery to reduce the risk of gallstones.    10. Med check/progress check with me in 4 weeks or so. Message if questions on medications arise.    11. Stair step downs to recondition the Achilles tendons, If increased pain let me know and we can refer you to PT.    12. Occ nicotine use. Needs to quit.        HISTORY OF PRESENT ILLNESS:  Weight Loss History Reviewed with Patient 2/4/2021   How long have you been overweight? Since early childhood   What is the most that you have ever weighed? 379   What is the most weight you have lost? 45   I have tried the following methods to lose weight Watching portions or calories, Exercise, Atkins type diet (low carb/high protein)   I have tried the following weight loss medications? (Check all that apply) None   Have you ever had weight loss surgery? No       CO-MORBIDITIES OF OBESITY INCLUDE:  No flowsheet data found. DMII complicated by his perianal fistulas    PAST MEDICAL HISTORY:  Past Medical History:   Diagnosis Date     Diabetes (H)      History of 2019 novel coronavirus disease (COVID-19) 9/18/2020    Require hospitalization, no intubation.     Hyperlipidemia      Morbid obesity (H) 8/12/2020     Obesity      Pneumonia     August 2020     Vitamin D deficiency     Created by Conversion  Replacement Utility updated for latest IMO load       PAST SURGICAL HISTORY:  Past Surgical History:   Procedure Laterality Date     WI KNEE SCOPE,DIAGNOSTIC      Description: Arthroscopy Knee Right;  Proc Date: 09/23/2010;  Comments: football injury on 9/7/10     WI PLACEMENT,SETON N/A 1/27/2021    Procedure: EXAM UNDER ANESTHESIA WITH PARTIAL  FISUTLOTOMY, REVISION SETON, AND PLACEMENT OF SETON, EXCISION THIGH SKIN TAGS;  Surgeon: Antione Kwok MD;  Location: [facility];  Service: General     right hand surgery  2015       FAMILY HISTORY:   Family History   Problem Relation Age of Onset     Diabetes Mother      Hypertension Father      Diabetes Brother        SOCIAL  HISTORY:   Social History Questions Reviewed With Patient 2/4/2021   Which best describes your employment status (select all that apply) I work full-time   If you work, what is your occupation?    Which best describes your marital status: Single   Do you have children? No   Who do you have in your support network that can be available to help you for the first 2 weeks after surgery? Family   Who can you count on for support throughout your weight loss surgery journey? Family   Can you afford 3 meals a day?  Yes   Can you afford 50-60 dollars a month for vitamins? Yes       HABITS:  BAR SURGERY - HABITS 2/4/2021   How often do you drink alcohol? 2-4 times a month   If you do drink alcohol, how many drinks might you have in a day? (one drink = 5 oz. wine, 1 can/bottle of beer, 1 shot liquor) 7 to 9   Do you currently use any of the following Nicotine products? No   Have you ever used any of the folowing nicotine products? Cigarettes   Have you or are you currently using street drugs or prescription strength medication for which you do not have a prescription for? No   Do you have a history of chemical dependency (alcohol or drug abuse)? No       PSYCHOLOGICAL HISTORY:   Psychological History Reviewed With Patient 2/4/2021   Have you ever attempted suicide? Never   Have you had thoughts of suicide in the past year? No   Have you ever been hospitalized for mental illness or a suicide attempt? Never   Do you have a history of chronic pain? No   Have you ever been diagnosed with fibromyalgia? No   Are you currently being treated for any of the following? I do not have a mental illness       ROS:  BAR NBS ROS 2/4/2021   Skin: None of the above   HEENT: Missing teeth, None of these   If you answered yes to missing teeth, please indicate how many: 1   Musculoskeletal: Limited mobility, None of the above   Cardiovascular: None of the above   Pulmonary: Shortness of breath with activity   Gastrointestinal:  "None of the above   Genitourinary: None of the above   Hematological: None of the above   Neurological: None of the above       EATING BEHAVIORS:  BAR SURGERY - EATING BEHAVIORS 2/4/2021   Have you or anyone else thought that you had an eating disorder? No   Do you currently binge eat (eat a large amount of food in a short time)? No   Are you an emotional eater? No   Do you get up to eat after falling asleep? No       EXERCISE:  BAR SURGERY - EXERCISE 2/4/2021   How often do you exercise? Less than 1 time per week   What is the duration of your exercise (in minutes)? 60+ Minutes   What types of exercise do you do? walking, gym membership, weightlifting   What keeps you from being more active?  Lack of Time, Too tired       MEDICATIONS:  Current Outpatient Medications   Medication Sig Dispense Refill     ACCU-CHEK GUIDE ME GLUCOSE MTR Misc Use As Directed.       ACCU-CHEK GUIDE TEST STRIPS strips USE TO TEST BG QID       metFORMIN (GLUCOPHAGE XR) 500 MG 24 hr tablet Take 1 tablet by mouth twice daily for 7 days, then increase to 2 tablets by mouth twice daily 120 tablet 11     No current facility-administered medications for this visit.        ALLERGIES:  No Known Allergies    LABS/IMAGING/MEDICAL RECORDS REVIEW: A1c 7%. During August, ferritin over 4000 and A1c over 14%. COVID illness September.    PHYSICAL EXAM:  Vitals:    02/05/21 1414   BP: 124/76   Height: 5' 9\" (1.753 m) (2/5/2021  2:14 PM)  Initial Weight: 344.5 lbs (2/5/2021  2:14 PM)  Weight: (!) 344 lb 8 oz (156.3 kg) (2/5/2021  2:14 PM)  Weight loss from initial: 0 (2/5/2021  2:14 PM)  % Weight loss: 0 % (2/5/2021  2:14 PM)  BMI (Calculated): 50.9 (2/5/2021  2:14 PM)  SpO2: 98 % (1/27/2021 12:00 PM)      General: Young  male in NAD, normal english skills. Ambulatory. Central adiposity w/ some trace sock edema.  CTA without wheezes.   RRR without murmur.  Nontender abdomen. Small fat containing periumbilical hernia.  No tremor.   Anal fistulae not " examined today.  Motivated for change but hesitant toward surgery at present.    In summary, Saravanan Park has Class III obesity with a body mass index of Body mass index is 50.87 kg/m . kg/m2 and the comorbidities stated above. He completed an informational seminar and is a candidate for the TBD but if cleared, both LSG and RNY gastric bypass would offer improved control of weight related comorbidities. Given age and some fistula issues, sleeve gastrectomy may be better option but DMII would likely improve most with RNY gastric bypass.   Once the patient has completed the requirements in their task list and there are no further recommendations, the pt will be allowed to see the surgeon of her choice for consultation on the TBD surgery. Patient verbalizes understanding of the process to surgery and expectations for the postoperative period including the need for lifelong lifestyle changes, vitamin supplementation, and laboratory monitoring.  Sincerely,     Dru Rodriguez MD    I spent a total of 75 minutes face to face with the patient during today's office visit. Over 50% of this time was spent counseling the patient and/or coordinating care.

## 2021-06-15 NOTE — TELEPHONE ENCOUNTER
Bariatric Task List  Status:  Is patient a candidate for bariatric surgery?:  patient is a candidate for bariatric surgery -     Cleared to schedule surgeon consult?:  not cleared to schedule surgeon consult -     Status:  surgery evaluation in process -     Surgeon: Any -     Tentative surgery month/year:   -        Insurance: Insurance:  [facility] -     Cigna: PCP Recommendation and Medical Clearance:    -     HP Referral:    -     Other:  Ama to call to discuss insurance requirements. -        Patient Info: Initial Weight:  344.5#, 5'9, BMI 50.87 -     Date of Initial Weight/Height:  2/5/2021 -     Goal Weight (lbs):  330 -     Required Weight Loss:  330# prior to liquid diet  -     Surgery Type:  undecided -     Multidisciplinary Meeting:    -        Dietician Visits: Structured weight loss required by insurance?:    -     Dietician Visit 1:  Needed -     Dietician Visit 2:    -     Dietician Visit 3:    -     Dietician Visit 4:    -     Dietician Visit 5:    -     Dietician Visit 6:    -     Dietician Visit additional:    -     Clearance from dietician to see surgeon?:  Needed -     Dietician Notes:    -        Psychological Evaluation: Psych eval:  Needed - Vamshi   Therapist letter of support:    -     Psychiatrist letter of support:    -     Establish care with therapist:    -     Complete eating disorder evaluation:    -     Letter of clearance from therapist/eating disorder program:    -     Other:    -        Lab Work: Complete Blood Count:  Needed -     Comprehensive Metabolic Panel:  Needed -     Vitamin D:  Needed -     Hgb A1c:  Needed - and C-peptide   PTH:  Needed -     H. pylori:    -     TSH:  Needed -     Nicotine Testing:  Needed - 3 months after quit date.   Other:  Needed - Inital labs      Consults/ Clearance: Sleep Medicine:  Needed - Sleep Study ordered-call if you don't hear from the sleep clinic at [phone]   Cardiac:    -     Pain:   -     Dental:    -     Endocrine:    -    "  Gastroenterology:    -     Vascular Medicine:    -     Hematology:  Needed - Possibly, if ferritin still elevated after recheck.   Medical Weight Management:   -     Physical Therapy/Exercise:    -     Nephrology:    -     Neurology:    -     Pulmonology:    -     Rheumatology:    -     Other    -     Other    -     Other    -           Testing: UGI:    -     EGD:    -     Other:    -        PCP: Establish care with PCP:    -     Follow up with PCP:    -     PCP letter of support:    -        Smoking: Quit tobacco use (3 months smoke free)?:  Needed - need quit date.   Quit date:    -        Patient Education:  Information Session:  Completed - Online   Given \"Making your decision\" handout?:    -     Given support group information?:  support group contact information provided - send email to [email or web address] to request invitation to next meeting.   Attended support group?:  Needed -     Support plan in place?:  Completed - Family   Research consents signed?:    -        Additional Surgery Requirements: Review Coag plan:  Completed - Standard   HgA1c <8:  Needed -     Inpatient pain consult:    -     Final nicotine screen:  Needed - 3 mos after quit date and w/pre-op labs   Dental work complete:    -     Birth control plan:    -     Other Requirements:  Actigall-needed for 6 months following surgery to prevent gallstones. -     Other Requirements:    -        Final Tasks:  Before surgery online class:  Needed - Pre-op class with dietitian/nurse   After surgery online class:  Needed - 1 week post op class with dietitian   Nurse visit for weigh-in and information:    -     Pre-assessment clinic visit with anesthesia team for H&P:  Needed - Pre-surgery appointment with primary MD within 30 days of surgery.   Final labs (Hgb, plt, T&S, UA):  Needed - Pre-op labs and testing      Notes: Perianal fistulas will need to be healed up before being cleared for surgery, keep us posted and working with . -   "

## 2021-06-15 NOTE — TELEPHONE ENCOUNTER
Prior Authorization Request  Who s requesting:  Pharmacy  Pharmacy Name and Location: [facility]  Medication Name: Victoza  Insurance Plan: [employer]  Insurance Member ID Number:  [id number]  CoverMyMeds Key: [id number]  Informed patient that prior authorizations can take up to 10 business days for response:   Yes  Okay to leave a detailed message: Yes

## 2021-06-15 NOTE — PATIENT INSTRUCTIONS - HE
Plan:  1. Welcome to the Bariatric Program. You were interested in some preoperative non surgical weight loss/medicaiton supported efforts while proceeding to learn about and get clearance for the surgical tool. To start, aim for 3 meals daily with 25-33 grams of lean protein per meal every 4-5 hours. Hydrating well in between meals with 80 oz of water daily and mindful eating strategies to feel in control of appetite and follow up with our dietician for further education/help. To curb appetite trial of phentermine:  Start Half a tablet after breakfast but no closer than 12 hours before your desired bedtime (18.75mg).   Don't use if overly stimulated, anxious/insomnia/racing heart beats/mood swings and don't mix with cold medications or other stimulants.    2. Weight should be below 330 lbs before clearing you.    3. 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 Kwok.    4. We'll try to get Victoza covered this week. If covered, start 0.6mg injected daily and then week two increase to 1.2mg injected daily if tolerated. If doing well, week three increase to 1.8mg injected daily (top dose) and stay on that until our follow up. Stop if any abdominal pains/nausea/vomiting and let me know. Continue metformin. Last A1c was looking better but continued control will be important for skin wounds/fistula healing.     5. Referral for sleep study given high risk for sleep apnea based on neck and airway size/BMI.    6. Check c-peptide today to see how response to weight loss your diabetes will be. Likely very given recent diagnosis last summer.    7. Recheck ferritin levels as I suspect your severe illness/COVID caused high elevations in August.  If persisting elevations, we may consider hematology follow up.    8. Attend support group at least once this spring.    9. Anticipate actigall use after surgery to reduce the risk of gallstones.    10. Med  check/progress check with me in 4 weeks or so. Message if questions on medications arise.    11. Stair step downs to recondition the Achilles tendons, If increased pain let me know and we can refer you to PT.    12. Nicotine cessation mandatory for surgical tool.          Before being submitted for insurance approval, you will need the following:    -Clearance by the Psychologist  -Clearance by the dietitian  -Attend Support Group ([address] [place] [address] at [facility]) 2nd Tuesday of the month 6:30-8pm. Make sure to sign in.  -Routine Health Care Maintenance must be up to date (mammograms yearly after age 40, paps as recommended by your primary provider,  colonoscopy after age 50, earlier if high risk.  -If you are on estrogen, estrogen will be discontinued one month prior to surgery. It may be resumed one month after surgery unless otherwise advised to limit blood clotting risks.  -Pre Operative Lab work-ordered today.    -Structured weight loss visits IF mandated by your insurance carrier or bariatrician.  -Surgeon consult as we get nearer to potential surgery or sooner if you have special considerations that may make your surgery more complex.  -If you have sleep apnea you will need to be using CPAP for at least one month before surgery to reduce your risk of breathing problems after surgery. Make sure to bring your CPAP or BiPAP to the hospital at the time of surgery.    -You will need to be tobacco free for 2 months before surgery and remain a non-smoker thereafter. If you are currently smoking or have recently quit, your urine will be evaluated for tobacco metabolites pre-operatively.  Smoking is a major risk factor for developing ulceration of your surgical sites and potential severe complications.    -If you are on insulin, you might be referred to an endocrinologist who will manage your insulin during the liquid diet and around the time of surgery. This endocrinologist does not replace your primary provider or  your endocrinologist.   -You will need an exercise plan which includes MOVE, ie., walking and MUSCLE, ie.,calisthenics, bands, weight, machines, etc...Maintenance of long term weight loss and quality of life is much improved with regular exercise as the weight comes off.  ______________________________________________________________________    Remember that after your bariatric surgery, vitamin supplementation is a lifelong need.    You will take:    B-12 300-500mcg or higher sublingual (under the tongue) daily or by 1000mcg injection 1-2X/month  D3:  3000- 5000 IUs daily   Multivitamin containing 18mg of iron twice a day  Calcium citrate 1 or 2 daily    To keep your weight off and your vitamin levels up, follow-up is important.    Your labs will be monitored every 6 months for the first two years (every 3 months if you had a duodenal switch) and yearly thereafter.    To avoid ulcers in your stomach avoid tobacco forever, alcohol in excess, caffeine in excess and anti-inflammatories (NSAIDS)  (Aspirin, Ibuprofen, Naproxen and similar medications). Tylenol is fine at usual doses on the box.    If you are told by your physician take Aspirin to protect your heart or for another reason, make sure to take omeprazole or similar medication (protonix, nexium, prevacid) to protect your stomach.    Remember that alcohol affects you differently after bariatric surgery. If you have even ONE drink DO NOT DRIVE due to rapid absorption and fast spiking of blood alcohol levels within minutes of consumption.     Slowly Increasing your exercise capacity such that 3-6 months after your surgery you're tolerating 150-250 minutes of exercise weekly will help optimize your metabolism and improve the chance of good weight loss maintenance.                  LEAN PROTEIN SOURCES  Getting 20-30 grams of protein, 3 meals daily, is appropriate for most people, some need more but more than about 40 grams per meal is not useful.  General rule is  drinking one ounce of water per gram of protein eaten over the course of the day:  70 grams of protein each day, drink 70 oz of water.  Protein Source Portion Calories Grams of Protein                           Nonfat, plain Greek yogurt    (10 grams sugar or less) 3/4 cup (6 oz)  12-17   Light Yogurt (10 grams sugar or less) 3/4 cup (6 oz)  6-8   Protein Shake 1 shake 110-180 15-30   Skim/1% Milk or lactose-free milk 1 cup ( 8 oz)  8   Plain or light, flavored soymilk 1 cup  7-8   Plain or light, hemp milk 1 cup 110 6   Fat Free or 1% Cottage Cheese 1/2 cup 90 15   Part skim ricotta cheese 1/2 cup 100 14   Part skim or reduced fat cheese slices 1 ounce 65-80 8     Mozzarella String Cheese 1 80 8   Canned tuna, chicken, crab or salmon  (canned in water)  1/2 cup 100 15-20   White fish (broiled, grilled, baked) 3 ounces 100 21   [place]/Tuna (broiled, grilled, baked) 3 ounces 150-180 21   Shrimp, Scallops, Lobster, Crab 3 ounces 100 21   Pork loin, Pork Tenderloin 3 ounces 150 21   Boneless, skinless chicken /turkey breast                          (broiled, grilled, baked) 3 ounces 120 21   [place], [place], Kinder, and Venison 3 ounces 120 21   Lean cuts of red meat and pork (sirloin,   round, tenderloin, flank, ground 93%-96%) 3 ounces 170 21   Lean or Extra Lean Ground Turkey 1/2 cup 150 20   90-95% Lean [place] Burger 1 rachel 140-180 21   Low-fat casserole with lean meat 3/4 cup 200 17   Luncheon Meats                                                        (turkey, lean ham, roast beef, chicken) 3 ounces 100 21   Egg (boiled, poached, scrambled) 1 Egg 60 7   Egg Substitute 1/2 cup 70 10   Nuts (limit to 1 serving per day)  3 Tbsp. 150 7   Nut [place] (peanut, almond)  Limit to 1 serving or less daily 1 Tbsp. 90 4   Soy Burger (varies) 1  15   Garbanzo, Black, Gamez Beans 1/2 cup 110 7   Refried Beans 1/2 cup 100 7   Kidney and Lima beans 1/2 cup 110 7   Tempeh 3 oz 175 18   Vegan crumbles  "1/2 cup 100 14   Tofu 1/2 cup 110 14   Chili (beans and extra lean beef or turkey) 1 cup 200 23   Lentil Stew/Soup 1 cup 150 12   Black Bean Soup 1 cup 175 12             What makes a person succeed with dramatic and sustained weight loss?    It's being at the right point in your life where you feel the need to lose the weight, not because anyone told you so but because of a voice inside of you that says, \"I am ready for this\".  You're now at a point where you may be feeling anxious, irritable and when you look in the mirror you do not recognize the person looking back.  Your healthy self is buried somewhere in that reflexion and you want to free it again.  This is the sort of motivation that leads to success, and it comes from you.    Because the only person that can lose that extra weight is you, I like my patients to focus on the mindset of being in Weight Loss Season.  This gives you permission to make the changes necessary to be consistent with the diet/activity and behavior changes that lead to successful, healthy weight loss.  Nearly any diet plan can work for weight loss, but keeping it healthy and nutrient based to prevent deficiencies/hair loss/fatigue or irritability is vital.  If you have a plan you want to try, we'll work with you to make sure no adjustments are needed to keep you healthy through your weight loss season and working with our Bariatric Dieticians you'll be given expert guidance to customize your diet plan to suit your particular needs. If you don't have your own ideas in mind, we are always happy to suggest well researched and validated plans that provide enough food to prevent hunger but still tap into your excess fat reserves and lose weight in a sustainable fashion.  There is great evidence that lean protein/healthy fat intake with good fiber intake while minimizing simple starches/carbs produces reliable/sustainable weight loss in most people. But some feel more connected to an " "intermittent fasting/fast mimicking or ketogenic diet.  These protocols can be hard for many to stick with and that's why we prefer the protein/healthy fat focused diets but if these alternative strategies appeal to you, we can work with you to optimize your knowledge and results with these tools.    Losing weight is a temporary commitment, but you need to be \"All In\" to have a good weight loss season.  To avoid frustration, you have to be willing to be on track 19 out of 20 days or even better than that. But, weight loss season is generally only 4-8 months in length. After that length of time, it can be hard to maintain a negative calorie balance and our brain, motivations and metabolism will usually bring you to a plateau that cannot be broken in this modern world where other commitments start to take priority. That's when we look to stabilize the weight loss you've achieved.  If you've reached your goal by that time, fantastic, and job well done.  If there is more to go, then after a few months of stabilization, we can usually attack that previous plateau and break into new territory.    Because of this time limitation, we want to really get to work right away and get into a sustainable routine ASAP.  One of the best predictors of how much weight you're going to lose throughout the season is how much you lose in the first 6 weeks, so prepare well and jump in with both feet.      Occasionally, people may feel like they cannot commit fully to the changes necessary and may want to change one thing at a time and \"get used to\" the idea of losing weight.  That is OK because that is where they are in their life, and they cannot fully commit for any number of reasons.  It's part of that internal motivation and they just haven't reached PRIORTY NUMBER ONE status yet. It's possible that what they need is more time to reach that point and I am always willing to work with people that want to \"dabble\", but understand, the " "amount of success obtained with half measures, is much less than half results. Behavior Change cannot occur until we prepare our minds, bodies and environment for what is to come, action!    As you go through your plan, look for things to keep your motivation rolling.  The most successful people have a goal or target/reward that they are working towards.  Having a reward that celebrates your new fitness, mobility and energy is the best sort because it will encourage you to do well with the weight maintenance phase and long term lifestyle changes that promotes keeping the weight off for the long term.  Usually, \"getting healthier\" or improving blood tests or losing weight so your clothes fit better is not as internally motivating as having a tangible reward.  A good weight loss season reward is one that isn't food based, is affordable, but something special:  Something you won't be getting/doing unless you achieve your goal.   It s important to keep to the rule of success:  in order to get the reward, your goal MUST be achieved. Write this reward down, where you can look at it daily and keep it in the front of your mind as you go through your weight loss season and it will help keep you on track.    Tools that help change behavior are vital for success. The most studied and most supported tool for weight loss is nothing more than writing down your food and weight every day.  Every Day.  Accurately and completely.  When you commit to weight loss season, this information tells you whether you're getting ENOUGH food to fuel your weight loss properly as well as teaches you the interaction between different foods you eat and how your body responds with weight loss.  You'll see that sometimes after a heavy workout you don't see the scale move until 2-3 days later.  How saltier meals (chili for example) may make you retain water for 4-5 days before you see the weight come off, you'll get used to the mini-plateaus that " develop after a good 3-8 lb drop in weight as well as how you break through if you keep working the diet as you should.    Weight loss is not a linear process, there are mini ups and downs.  Learning how your body loses weight and getting comfortable with that is very rewarding. The act of writing words on paper also solidifies your will power and commitment to the season of weight loss and that by itself changes your brain chemistry/appetite, motivation and prepares you for maximal success.     Behavior change is all about getting into a new routine.  The old habits and routine have to change because without changing the circumstances of how you gained your weight, it's unlikely you'll enjoy satisfying results. If you have snacking habits, like every time you walk through the kitchen you grab a little something, well, that habit has to change and be replaced by a new habit.  It can be something as simple as keeping a doodle pad on the counter that you make a few scribbles and then walk through the kitchen having not opened the cupboard, or starting with a glass of water and leaving the kitchen without anything else, or checking your food journal to see how many calories you have left for the day.  Boredom is the enemy as are the old habits. Break new ground and try to push those old habits into a deep hole.  There are apps/counseling options available that can help with some of the day to day urges/behaviors if you're struggling. One commercial product that does a good job is Noom.  Unfortunately, there is a subscription fee.    Finally, exercise always helps.  While not mandatory to lose weight, every little bit helps and exercise has so many other benefits that to not work it into your plan is to miss out on all the mood, sleep, stress and general health benefits that come from making yourself a little short of breath and sweaty at least 3-4 days out of the week.  The metabolism and calorie burn benefits aside,  "almost every chronic ailment in medicine gets better with proper, aerobic exercise.  Allow yourself to start slow and let your body prepare itself to accept harder training 4-6 weeks down the road, but start now and commit to a plan.  Whether you have the means to hire a , join a gym or just walk out your front door or go down to your basement for a video workout, get into a exercise routine and  after 3 weeks of at least 3 times a week exercise you should be at a point where you can slowly start ramping up 10% each week to our goal of at least 150-300minutes weekly of aerobic exercise and at least twice weekly resistance training/strenghtening with weights/bands or body weight exercises.     I am a big fan of modifying the free training plan, \"Couch to 5k\", for almost all of my patients. Just type it into [employer] or look it up on your smart phone luz store.  To modify the plan,  you can use the training plan for whatever aerobic activity you do (bike/treadmill/elliptical/rower/pool/etc). During the \"jog\" intervals, you just move a little faster or harder or increase the tension or incline.  You use those little intervals to switch up the workout and recruit more muscles and pump the blood a little more and then recover again in the \"walk\" intervals by slowing down, decreasing the incline or turning down the tension.  3-4 days a week is not that much to ask and the benefits are enormous.  Start slow and develop the base from which you can then build on and reduce the risk of injury.  It's much more important 2-4 months from now to be enjoying your exercise then it is to over exert yourself at the start and hurt yourself.  Starting slowly allows your body to accept the training better down the road when the exercise becomes crucial for weight maintenance.  Without exercise down the road during your maintenance phase, all this hard work you are about to put in can be undone. It usually takes about 100-300 " calories a day of exercise to maintain a weight loss and our focus during weight loss season is to generate the routine/activities and hobbies that make that enjoyable/sustainable.    Thanks for taking this first and most important step in your weight loss season.  Commit to it and we will cheerlead you all the way to success.  When things get tough or off track we'll offer guidance and analysis and when you reach your goal we'll celebrate your success.  In the end, it is all about your success, your health and what you do with it.      Dru Rodriguez MD  [facility]  [phone]    Example Meal Plan for a 4800-5751 Calorie Diet:    In order to fuel your weight loss properly and avoid hunger-induced overeating later in the day, for your height and weight, you will enjoy the most success by following the diet below or similar with adjustments based on your particular tastes and preferences.  Exercise may influence speed, amount of weight loss further.     I recommend getting into a meal routine and keeping it similar day to day in the beginning so you don t have to think too hard about what you re going to make/eat.  Keep snacks healthy, ideally containing protein and some vegetables.  Non-processed food is preferable to packaged items.  Eat at least a few crunchy green vegetables if having a snack, which should be 2-3 hours after your mealtimes(prepare these ahead of time for ease of use).  Drink 64 oz -80 oz of water daily for most, some of you will need more and we'll discuss it at your visit if that is the case.  When changing our diet and reducing our intake,  we can often mistake thirst for hunger or just have some grazing habits we have to break ('bored/mindless eating') and a glass of water and reconsideration of our hunger is often all that is needed.  Having the urge is not the problem, but watching it pass by without acting on it is the goal.    If you re having hunger  problems, add a protein drink/snack to your morning hours or afternoon snack with at least 20grams of protein and not too much sugar (under 10g).  A carton of higher protein/low sugar yogurt can work as well.  If the urge to snack is overwhelming and not satiated, try going for a 10 minute walk/exercise, come home and drink a glass of water and if still hungry, have a  calorie snack (handful of raw/sprouted nuts, veggies and string cheese, protein bar, etc).  Savor it.    It is better to have a large breakfast, a moderate lunch and a smaller dinner to fuel your day.  People lose 10-15% more weight during their weight loss season with this strategy. Optimizing your protein intake at each meal will further keep you more satisfied while eating less food overall.  Getting exercise in early has also been shown to offer the best results (before breakfast ideally but anytime is the right time to exercise if that is not an option for you).    To make sure you re getting adequate vitamins and minerals during weight loss, I recommend one complete multivitamin a day of your choice.  Consider a probiotic and taking some vitamin D 2000 IU daily.    Let supper be your last meal of the day and ideally try to have at least 12 hours between supper and breakfast the next day to tap into some beneficial overnight fasting dynamics.  Water in the evening is fine, unsweetened, non caffeinated herbal tea is helpful as well.  Consolidating your meals within a 8-12 hour period of your day will help tap into these additional metabolic benefits and tends to keep your appetite up for breakfast, further helping to stay on track.  For most of my patients I don't recommend an intermittent fasting style diet (many find it hard to fit in their lifestyle) but an overnight fast is very doable for most patients and helps regulate our hunger drives a little better.  This makes it very important to nail good intake at all three meals to feel  satisfied/energized and still lose weight.  If evening snacking desires are high, consider a glass of fiber supplement for some additional fullness (metamucil or similar). Most of us don't get the 25-30 grams per day of fiber that promotes good gut health/satiety.  Benefiber, metamucil, citrucel are reasonable/affordable options for most people.  Inulin, chicory, psyllium husk are reasonable options but start slow and low in the dose to avoid gas/bloating until your gut gets acclimated (ramping up to 5-10 grams per day of supplemental fiber after 3-4 weeks if needed).      Example Meal Plan:  Breakfast: 450-475 Calories  1 egg cooked on low in olive oil:   calories.  5oz Greek Yogurt (Fage plain classic: ~150 tia)  Handful of Berries of your choice (about a calorie per berry or 20-40cal per handful)    cup(cooked) of  old fashioned oatmeal or 1/2 cup(cooked) steel cut oats. (150 tia)  Sprinkle amount of brown sugar and a pat of butter. (40 tia)  Glass of  Water  Black coffee or unsweetened Tea (0calories).      2-3 hours Later Snack: (195 calories).  Glass of water  One string Cheese (80 calories) or 4 oz creamed cottage cheese (115 calories) with  Crunchy Celery sticks (less than 10 calories per large stalk) 2 stalks. (20 calories)    of a  Large Banana or   of a Large Apple (60 calories):  eat second half at lunch or afternoon snack.     Lunch:300 -350 calories   Chicken Breast  (baked/broiled/roasted/grilled)  4-6 oz.  (125-180 tia), BBQ sauce/hot sauce/mustard/seasoning is free. Just use a reasonable amount. Or a can of tuna with 1 tablespoon mayonnaise.  Salad: lettuce, any other veggies (cucumbers, green peppers/celery you like and a small drizzle of dressing to just flavor.  Go as big on the veggies as you like,  as they are practically calorie free.   A whole, 8 inch cucumber is 45 calories, a whole green pepper is 23 calories, a stalk of celery is 9 calories.  [place] Dressing is 60 calories  per tablespoon..so moderate your desired dressing or do a drizzle of olive oil and splash of balsamic vinegar on top,  Total calories unlikely to be over 150 even with dressing.  Glass of Water.    Option for lunch is meal replacement protein drink/smoothie.  Need at least 20 grams of protein and eat the rest of your apple/banana from the morning snack.      Afternoon Snack: 150-200 calories   Cheese Stick or cottage cheese again  and a fresh fruit OR  Granola Bar (protein Bar acceptable if under 200 calories OR  Homemade smoothies:  8oz skim milk,  a handful of berries (fresh or frozen and a serving of protein powder such as BiPro or Lisa sWhey for example.  If you don't like dairy, make with 8oz water, one small banana, handful of berries and the protein powder, add any veggies you want as well:  roughly 200 calories.   Glass of Water    Dinner: 325 calories  4oz of fresh, Atlantic salmon.  Broiled (salt/pepper/dill) for about 8-8.5 minutes (200calories) or  4oz filet mignon steak or sirloin steak  Salad or vegetable sautéed lightly in olive oil or   Broccoli 1.5  cups chopped and steamed  or micro-waved in a little water (75 calories)  Glass of Water,    Cup of herbal tea (unsweetened, caffeine free)      Herbs and seasonings are encouraged to flavor your foods/vegetables.  Make your food delicious.      Tips for Success:  1.  Prepare proteins ahead of time (broil chicken breasts in bulk so you can grab and go), steel cut oats/lentils can be stored in casserole dish/bowl in the fridge for quick scoop in the morning and rewarm in microwave, make use of crock pot recipes (watch salt content).  Making meals that cover 3-4 future meals is an easy way to stay on track.  2.  Drink a 8-12 oz glass of water every 2-3 hours when awake.  We often mistake hunger for thirst, especially when losing weight.  3. Remember your Reward and Motivation when things get hard.  4.  Weigh yourself every morning and record, you'll stay  "on track better and learn how our biorhythms, diet and elimination patterns show up on the scale. Don't worry about 1 or 2 day patterns, but when on track you'll notice good trend downward of weight over 3-4 day segments.  Plateaus tend to resolve after 4-8 days in most cases if you stay consistent with your plan.  These are natural and part of weight loss, even if you're perfect with your plan execution.  5. Call if problems/concerns.  [employer] is a great tool to stay in touch and provide weekly outside accountability. Check in with questions or if you want to brag.  6.  Find a handful of meals/foods that keep you on track and feeling good and get into a routine that is sustainable for you.  It's OK to have a routine that works for you.  7.  Consider taking a complete multivitamin just to make sure all micronutrients are adequate during weight loss.  8. If losing hair/brittle nails it usually means you are not taking enough protein.  Minimum goal is 60 grams daily of protein for smaller women, 80 grams a day for men. Consider taking Biotin as supplement or a \"Hair and Nail\" multivitamin.        Weight Loss Shopping list:    Having the proper food on hand and ready to go is very important in losing weight.  Everybody has their own preferences/tastes so modify this list as you need but the following are foods that have been found to be helpful in following a calorie restricted diet.   If you are a person that doesn't \"like to eat my vegetables\", I recommend making smoothies and throwing the veggies into the  with some fruit and protein powder.      Fruits:  Generally limit to 2-3 whole fruits a day.  Do not buy Juice.  We depend on the fiber and chewing to slow us down and get phytonutrients/antioxidants available in the skins of fruits for health benefits.  Chewing also signals our stomach to send less hunger signals to our brains:    Apples (1-2 daily), Bananas (no more than one full banana a day), Grapes (2 " "handfuls a day), Clementines/oranges (one daily), berries (blue/rasp/straw:  2 handfuls a day). Watermelon (cubed for easy access, small bowl).    Vegetables:  Eating raw gets most nutrient and fiber benefits but cooked veggies are fine as well, using frozen for cooking or in smoothies is fine as well.  The more chewing/crunchiness the better the hunger control.  No limit to how many a day, but you should at least get 4 handfuls a day or more minimum.  Wash and cut up your vegetables into easy to carry, on the go sizes that are easy to put in plastic bags/pyrex and carry to work/school/etc.  Frozen veggies are just fine as well.     Broccoli, Carrots (no more than 3 a day large carrots or 2 handfuls of baby carrots, as they are very sweet), celery, cucumbers, green peppers, green beans (canned or fresh/frozen), Lettuce(all types), Beets(for roasting, will likely turn urine and stool a bit purple), asparagus.  Go crazy with the veggies, just wash well prior to eating.    Protein:  Women need at least  grams of protein a day and men at least  grams a day, more is fine.  Protein is our \"brain food\" and hunger suppressor and getting enough ensures maintenance of muscle mass during weight loss.  Vegetarians should look to balance their protein intake to get all essential amino acids and discuss with dietician if help is needed (mix of beans/soy/etc).  Protein powders or drinks count and can be a great way to control appetite during the day and easy to grab and go/carry to work/etc.  Premier Protein, BiPro, TarasWhey are all brands with high quality whey protein. For whey sensitive people, rice protein is an option as well.    Canned tuna/sardines or anchovies.  Fresh fish/salmon the day you plan to make it.  Chicken breasts/thighs (skinless while losing weight), filet mignon steak (leaner but ) or marinade sirloin (wipe off before cooking). Eggs cooked in olive oil for breakfast is a good way to " get protein and good fat in the a.m. (cook on low heat to prevent transformation of olive oil into less healthy fat).  Greek Yogurt with at least 20 grams of protein per serving and less than 11 grams of carbs/sugars.  String Cheese  Cottage Cheese: 2% or fat free per your preference.            Exercise Guidance    Nearly everything that bothers us gets better when the proper amount of exercise can be done in the proper amounts.  Getting to that level safely and without injury is the key.  When it comes to weight loss, exercise is especially important in maintaining the weight loss.  Unfortunately, one of the harsh realities is that substantial weight loss slows our metabolism, often anywhere from 5-20%.    Our brain always remembers our heaviest weight and we can return to that if we're not mindful and moving regularly.  Our biology doesn't understand the concept of having too much energy, only not having enough.  As such, when we lose weight, it's thought that the brain interprets this as we're ill or in a famine and dials back our metabolism to limit further weight loss.  This is why exercise is so important in keeping the weight off and is the main reason people have some weight regain from their low weight point after weight loss.  We have to make up that 10-20% of calories not being burned.Since we can restrict our intake for only so long, exercise becomes very important in our long term healthy weigh maintenance to balance out the occasional indiscretion with our diet.    Generally, for every 5% body weight reduction in a weight loss season, a person needs to add  kilocalories of exercise in their daily routine to keep that weight off for the long term.  This is why it's vital to be starting your fitness regimen during weight loss season, so that routine is well established as you move into your maintenance period.    Additionally, all sorts of good enzymes and genes turn on with exercise and our  stress, sleep, mood and bodies feel better when we can get to the point of making ourselves a little sweaty and short of breath 35-50 minutes most days of the week. But we have to start with what we can do first and give ourselves permission to work our way up to this goal.    Who isn't ready for exercise? Well, if you get severe dizziness/palpitations, chest pain or short of breath/faint with even minimal activity like walking across a room or you're having to pause while going up a flight of stairs, then getting your heart and/or lungs fully evaluated prior to starting an exercise regimen is recommended. Everyone else can probably start a program, but everyone may start at a different point:  Some can set a 5-10 minute walking goal and others will be able to ride their bike for an hour.      Start with where you're at and look to add 10% more each week until you're at that 150 minutes or more a week (or 75 minutes/week or more of vigorous exercise). Moderate exercise can be estimated as the pace you can carry on a conversation and vigorous is the pace at which you can get 3-5 words out before having to take a breath.  If you're using heart rate monitoring, Moderate is about 60% of your maximum heart rate and vigorous about 75%. (Max heart rate estimated as 220 beats minus your age:  Example: 220-age of 43 =176 Beats per minute (BPM) maximum. 0.6X 176= 105 BPM (moderate), 132 BMP(vigorous)).    If you like to count steps, the 10,000 steps per day does correlate well with weight maintenance but try to make at least 20-25% of those steps at a brisk pace (like you are about to miss your bus).    Finally, if you are pressed for time, it's important to know that some exercise is better than none.  High Intensity Interval training (HIIT) is a good way to get as much out of a short period of working out. If you can't walk, use the stairs, bike or swim; you could use a punching/arm workout regimen for your activity.  The  "idea with HIIT is to have a 3-6 minute warm up period of low intensity and the 3-6 \"intervals\" where you push the intensity up and then recover and start the next interval. One study showed that 3 intervals of 20 seconds at \"Maximum Effort\" while either biking on a stationary bike or going up stairs and then having 100 seconds recovery time before the next Maximum Effort was equally as beneficial on cardiovascular fitness development as doing 30 minutes of moderately paced walking 3 days weekly over a 6 week period of time.  So intensity matters. You just need to be able to safely do your desired exercise without injury. There are many great HIIT exercises/routines out there. IF you're not doing much exercise currently, I recommend giving your self 2-3 weeks of moderate exercise, 3 days weekly minimum to get your bones/tendons/muscles used to exercise before going for High Intensity workouts.    If you like to use Apps on the phone, the couch to 5k luz and 7 minute workout apps are nice places to start if you are reasonably healthy.  There are hundreds of other options out there.  Consider viewing [email or web address] if gentler exercise/movement is desired. Videos on Sekou Chi and chair yoga for seniors exist and are free. Check them out and let's get that 3-4 days a week routine going.    Let's move!  Dru Rodriguez MD.       MEDICATIONS FOR WEIGHT LOSS  There are several medications available to assist us in weight loss.  By themselves, without compliance to a change in diet and increase in movement/activity these medications are disappointing in their results. However, combined with a closely monitored program of diet change and exercise they can be very effective in controlling appetite and boosting initial weight loss.  All weight loss medications need continual re-evaluation for efficacy as their side effects and health benefits fail to be worthwhile if a person is not continuing to lose weight or in maintaining their " healthy weight.  Some weight loss medications are scheduled drugs, meaning there is at least a theoretical possibility for developing addiction to them but in practice this is rare.  We do anticipate coming off meds in the future after stabilization of weight loss is assurred.  Finally, a tolerance can develop and people s perceived efficacy of medication can diminish.  In communication with your physician, it may be appropriate to intermittently take a break from these medications and then restart again (few weeks off then restart again) if a plateau is reached that cannot be broken through.  Each person can respond to a medication differently and to be a good option for you, it will need to be affordable, effective and well tolerated with minimal side effects.    In most cases, weight loss progress after one month and three months will be obtained and if a patient is not reaching the satisfactory progress towards weight loss, the medications may be discontinued.  The thought is that if a person is taking a weight loss medication and not receiving the potential health benefit of that drug, the side effects are not worthwhile and use should be discontinued.  On the flip side, there are many people on some weight loss medications for years because it continues to be an effective tool in their weight management and they are tolerating the medication without any long-term side effects.  Each person's response and purpose will be evaluated.      PHENTERMINE (Adipex): approved in 1959 for appetite suppression.  It has stimulant effects and cannot be used with Ritalin, Concerta, or other stimulants.  Although it is not highly addictive, it's chemically related to amphetamines which are addictive.  Occasional dependence can develop, but rarely. The most common side effects are dry mouth, increased energy and concentration, increased pulse, and constipation.  You should not take phentermine if you have glaucoma,  hyperthyroidism, or uncontrolled/untreated hypertension or overly anxious. You should stop if dramatic mood swings, severe insomnia, palpations, chest pains, visual changes or if your Blood Pressure is consistently elevated or any time it's over 160/90.   It's ok to go off the med for a few weeks and restart if efficacy is wearing off.  $24-$30 for 90 tablets at [facility]. Females are required to have reliable birth control to reduce the risk birth defects/miscarriage.      TOPIRAMATE (Topamax): Anti-seizure medication, also used to prevent migraines and sometimes for mood stabilization.  Side effects include paresthesia, glaucoma, altered concentration, attention difficulties, memory and speech problems, metabolic acidosis, depression, increase in body temperature and decrease sweating, risk of kidney stones.  Do not take Topamax while taking Depakote as this can cause high ammonia levels.  You must have reliable birth control as Topamax can cause birth defects.  If prolonged use has occurred it should be tapered off slowly to avoid withdrawal issues.  Insurance usually covers Topiramate.  At higher doses, there may be some confusion/forgetfulness associated with this so we try to limit dose to under 75mg twice daily to reduce this risk. Often covered by insurance as it's used for many reasons.  Topamax will cause carbonated beverages to taste bad. A recheck of your kidney/electrolytes may occur within a few months of starting.    QSYMIA (Phentermine + Topamax):  See above information about phentermine and Topamax.  Most common side effects are paresthesia, dizziness, distortion of taste, insomnia, constipation, and dry mouth.  See above descriptions for the two individual agents.Females are required to have reliable birth control to reduce the risk birth defects/miscarriage.  $150-$220 per month      Liraglutide (Victoza/Saxenda):   Part of the family of Glucagon Like Peptide Agonists, liraglutide directly  "suppresses appetite and is often used by diabetic patients due to decrease liver production of glucose, thereby improving glucose levels.  It also slows how quickly the stomach empties. May be hard to get covered for non diabetics and is an injectable medication delivered via a injector pen. It can be very costly without insurance coverage (over $500/month).  Small risk for pancreatitis and dose should be held if increased mid abdominal pain/burning. It is not to be used if previous Multiple Endocrine Neoplasia. In rodents, may increase risk of thyroid tumors and not indicated for anyone with hx of medullary thyroid cancer as a result.  If changes in voice/swallowing should be discontinued. Reliable birth control required in women.    Contrave (Bupropion/Naltrexone).    Synergistic combination of a mild appetite suppressing anti-depressant (Bupropion) whose effects are increased due to interaction with Naltrexone.  Naltrexone may have some effects on craving and is often used in addiction medicine to help previous opiate addicts be less prone to relapse as it blocks the action of opiates. Should be stopped if any need for opiate pain medication, surgery or planned procedures where you'll be given sedation/anesthesia. If prolonged use recommend stepping down bupropion over 2-3 weeks to limit any risk of withdrawal issues. Side effects may include dry mouth, increased heart rate, mild elevation in Blood pressure;  dizziness, ringing in the ears, anxiety (typically due to bupropion), nausea, constipation, and some get fatigued with naltrexone.  About $210 on [facility] for 120 tabs of \"Contrave\", the brand name without insurance coverage. Generic Bupropion 75mg: $25 for 120 tabs, Naltrexone: $55 for 90 tabs without insurance coverage on [facility]. Cannot be used if pregnant/trying to conceive or breast feeding.      Plenity:   Recently available October 2020, by mail order pharmacy only, but expensive. $98/month.  2-3 " "capsules taken 20 minutes before 2 meals daily provides crystals that expand into a gel that provides a mechanical fullness. Gel mixes with your next meal and increases satisfaction by bulking the meal up to feel bigger than it truly is. Plenity is not absorbed and gets passed through the digestive tract and excreted in stools. May cause some bloating/gas/full feeling as it behaves like a fiber in many ways. Cost not available yet. FDA cleared in March of 2019 but not available in stores as of March of 2020. Clearance safety and efficacy data done under \"Gelesis\" name. Not appropriate for people with stomach or bowel motility issues as requires you to pass it through digestive tract. [email or web address].com has more information.Information about the Weight Loss Medication Phentermine    When combined with mindful eating and behavior changes, weight loss medications can be a nice additional tool to maximize your weight loss season.  There are no magic pills and without diet and behavior changes, weight loss will be minimal.  Think of this medication as a tool to make your diet and behavior changes easier and you'll enjoy a higher probability of success.  Remember not to skip meals, but use this medication to tolerate your reduced calories more easily.  If you are very hungry in the evenings, you are likely not eating enough in the first 10 hours of your day and need to focus on getting your protein requirements in at each of your 3 daily meals.      Phentermine is a stimulant medication related to the amphetamine class of medication but with a lower risk of dependence and addiction.  It is used for weight loss by suppressing the appetite region of the brain.  It also may speed up the metabolic rate and give a person more energy.  Like any medication there are potential side effects and the most common are:  Dry mouth occurs in almost everyone (hydrate well), fewer people experience Palpatations, fast heart rate, elevation of " "blood pressure, restlessness, insomnia, dizziness, change in mood, tremor, headache, changes in bowel movements,itchiness, changes in sex drive.  If you are or may have become pregnant, do not use phentermine as it increases the risk for birth defects/miscarriage.  Do not use if breastfeeding.    Some people can develop serious side effects which include:  Heart strain (\"ischemia\").  Tachycardia (fast heart rate or irregular heart rate).  Hypertension  Pulmonary Hypertension  Psychosis  Dependency and abuse has occurred in some.  If you've been on high dose (37.5mg) for long periods, phentermine should be tapered down over a few weeks before abruptly stopping as seizures have been reported rarely.    We do not recommend taking it in combination with the following medications due to potential drug interactions which can increase the risk of side effects and/or potential for seizures:    Absolutely contraindicated are:  Amphetamines or other stimulants like ADHD medication: (dextroamphetamine, amphetamine, diethylpropion, isocarboxazid, methamphetamine, lisdexamfetamine, benzphetamine,dexmethylphenidate, methylphenidate, selegiline patch, sibutramine, tranylcypromine.    Avoid use with:   Dopamine, dobutamine, ephedra, ephedrine, epinephrine, isoproterenol, linezolid, norepinephrine, phenylephrine injection, venlafaxine (Effexor).    Monitor or modify dose with:  Acebutolol, atenolol, betaxolol, bisoprolol, carvedilol, droxidopa, esmolol, labetalol, magnesium citrate, metoprolol, nadolol, nebivolol, penbutolol, pindolol, propranolol, sotalol, timolol.    Caution with: armodafinil,betaxolol eye drops, brexpiprazole, bupropion, busulfan, caffeine, carteolol drops, enzalutaminde, ginseng, green tea, guarana, levobunolol drops, lindane cream, modafinil, afrin nasal spray (oxymetazoline), pamabrom, phenylephrine oral and nasal spray, pseudoephedrine (sudafed), rasgiline, sleegiline, bowel prep, tiagabine, timolol drops,  " "TRAMADOL due to increased risk of seizures.    The current cheapest place to fill your prescription is at [facility], [facility], [facility] or [place] pharmacy,[facility] or [facility] and is around $22for 90 tablets.  Occasionally, [facility] and [facility] have price matched, so call around and get the best price for you.  Other pharmacies may charge closer to$70- $100 for the same prescription. You don't have to be a member to use the pharmacy at [facility] currently.  An alternative some patients have tried is using a voucher system through [employer].  $25 paid on their website gets you a  voucher that can allows you to pick your meds up at [facility] without paying anything more.  [employer] may also offer discounted coupons and give prices around you.    Dosing:  We start with half a tablet for the first 2-3 weeks and if tolerating it without problems, you can take up to one full tablet daily in the morning after breakfast.  For those with evening hunger problems, sometimes half a tablet in the morning and half a tablet around 1 pm can be effective, however, risks of nighttime insomnia/restless increase with afternoon dosing so call me at the clinic if considering this regimen or having any issues.  You only have to use the amount effective for you, not to exceed one full tablet.  It can also be used situationaly and does not have to be taken every day. For more sensitive individuals,: get a pill cutter and cut the half tab into quarters and use a quarter of a tablet, about 9mg, for a couple weeks before increasing to half a tablet (18.75mg) if needed.  As always, if any questions give us a call at the [facility] telephone:  [phone].     Don't use Phentermine if sick/ill or using other stimulants/cold medications and it's OK to skip days that you don't feel the need for appetite suppressant assistance.  This medication works the day you take it and doesn't require \"building up\" in the " system.      Thank you for your interest in Support Group!  We currently have two options for support group but they are in the virtual format only at this time.  Both groups are using Microsoft Teams for their platform and you can access it through the web or an luz that can be downloaded to a smart phone if you have one.      The Pre- and Post-op Connections Group is on the second Tuesday of the month from 6:30-8pm and is hosted by Vamshi Hathaway, PhD.  If you are interested in this group, you will need to email him at [email or web address] each month and then he will in turn send you the invitation to join.      We also have a Post-op focused Connections Group the 4th Wednesday of the month from 11am-12pm that is mostly geared toward post-operative patients who are past three months post-op.  This group is hosted by MARCO ANTONIO Smalls, CBN, CIC and you can email her to join the group at [email or web address] each month and she will send you an invite similar to Vamshi's group.  If you decide you would like to be a regular attender at this group, we can add you to an automatic invitation list of people.    You can see more information about available support groups that the [employer] offers to our patients as well by following the link:    [email or web address]      Please let us know if you have any questions.     Thank you,   [employer] [facility]

## 2021-06-15 NOTE — TELEPHONE ENCOUNTER
Prior Authorization Request  Who s requesting:  Pharmacy  Pharmacy Name and Location: [facility]  Medication Name: Victoza  Insurance Plan: [employer]  Insurance Member ID Number:  [id number]  CoverMyMeds Key: [id number]  Informed patient that prior authorizations can take up to 10 business days for response:   Yes  Okay to leave a detailed message: Yes

## 2021-06-16 NOTE — PROGRESS NOTES
"SUBJECTIVE  Saravanan Park is a 26 y.o. male here for:    Chief Complaint   Patient presents with     Diabetes     DM type 2:   - on metformin, takes 4 tablets 2-3 times per week  - does admit to missing doses  - started phentermine and victoza 2-3 weeks ago- did not bring on his trip to [place]- he was there for about 5 days and just returned  - feels the medications have helped to curb his appetite  - following with bariatric clinic and is having labs drawn today and has follow-up scheduled   - he has not yet had sleep study      ROS  See HPI    Reviewed Past Medical History, Medications, Family History and Social History in Epic and up to date with no new changes.    OBJECTIVE  /80   Pulse 68   Temp 98  F (36.7  C) (Oral)   Ht 5' 9\" (1.753 m)   Wt (!) 347 lb (157.4 kg)   SpO2 98%   BMI 51.24 kg/m       General: Cooperative, pleasant, in no acute distress    LABS & IMAGES   Results for orders placed or performed in visit on 03/17/21   Glycosylated Hemoglobin A1c   Result Value Ref Range    Hemoglobin A1c 8.3 (H) <=5.6 %   Comprehensive Metabolic Panel   Result Value Ref Range    Sodium 137 136 - 145 mmol/L    Potassium 4.0 3.5 - 5.0 mmol/L    Chloride 102 98 - 107 mmol/L    CO2 22 22 - 31 mmol/L    Anion Gap, Calculation 13 5 - 18 mmol/L    Glucose 128 (H) 70 - 125 mg/dL    BUN 9 8 - 22 mg/dL    Creatinine 0.83 0.70 - 1.30 mg/dL    GFR MDRD Af Amer >60 >60 mL/min/1.73m2    GFR MDRD Non Af Amer >60 >60 mL/min/1.73m2    Bilirubin, Total 1.2 (H) 0.0 - 1.0 mg/dL    Calcium 9.3 8.5 - 10.5 mg/dL    Protein, Total 7.5 6.0 - 8.0 g/dL    Albumin 4.1 3.5 - 5.0 g/dL    Alkaline Phosphatase 43 (L) 45 - 120 U/L    AST 27 0 - 40 U/L    ALT 33 0 - 45 U/L   Parathyroid Hormone Intact   Result Value Ref Range    PTH 60 10 - 86 pg/mL   Ferritin   Result Value Ref Range    Ferritin 709 (H) 27 - 300 ng/mL   Magnesium   Result Value Ref Range    Magnesium 1.8 1.8 - 2.6 mg/dL   Folate, Serum   Result Value Ref Range    Folate " 9.5 >=3.5 ng/mL   Thyroid Stimulating Hormone (TSH)   Result Value Ref Range    TSH 2.20 0.30 - 5.00 uIU/mL       ASSESSMENT/PLAN:   Saravanan was seen today for diabetes.    Diagnoses and all orders for this visit:    Type 2 diabetes mellitus without complication, without long-term current use of insulin (H): A1C up from 7 to 8.3 today. Reviewed results with patient. He is not consistently taking his metformin- agreed to take metformin consistently, 2 g daily and continue victoza and dietary modifications with support through bariatric clinic. Repeat A1C in 3 months.   -     Glycosylated Hemoglobin A1c    Obesity, morbid, BMI 50 or higher (H): Has established with bariatric clinic. Labs drawn today per bariatric clinic- he has upcoming follow-up visit. Continue on phentermine and victoza- these are helping with appetite suppression. Encouraged patient to follow-up for sleep study as recommended.  -     Comprehensive Metabolic Panel  -     Vitamin D, Total (25-Hydroxy)  -     Parathyroid Hormone Intact  -     Vitamin B1 (Thiamine), Whole Blood (VIT B1 WB)  -     Ferritin  -     Copper, Serum or Plasma  -     Zinc, Serum or Plasma  -     Magnesium  -     Folate, Serum  -     C-Peptide  -     Vitamin A (Retinol), Serum or Plasma  -     Thyroid Stimulating Hormone (TSH)      Follow-up in 3 months for diabetes check, repeat A1C      Options for treatment and follow-up care were reviewed with the patient. Saravanan Park and/or guardian was engaged and actively involved in the decision making process. Saravanan DOWNS Vivian and/or guardian verbalized understanding of the options discussed and was satisfied with the final plan.      Kelly Watt MD

## 2021-06-16 NOTE — TELEPHONE ENCOUNTER
Central PA team  [phone]  Pool: HE PA MED ([id number])          PA has been initiated.       PA form completed and faxed insurance via Cover My Meds     Key:  [id number]     Medication:  victoza     Insurance:  [employer]        Response will be received via fax and may take up to 5-10 business days depending on plan

## 2021-06-17 NOTE — TELEPHONE ENCOUNTER
Telephone Encounter by Haleigh Wilburn at 2/15/2021  8:20 AM     Author: Haleigh Wilburn Service: -- Author Type: --    Filed: 2/15/2021  8:26 AM Encounter Date: 2/10/2021 Status: Signed    : Haleigh Wilburn       PRIOR AUTHORIZATION DENIED    Denial Rational: Insurance will cover 1.2 mg dosing, but will not cover 1.8 mg dosing until patient has tried and failed 1.2 mg dosing.  Currently prescription is written as a taper dose, patient may need two separate prescriptions one for 1.2 mg dosing (vikoza 2 lulu pens) and one for 1.8 mg dose (vicotoza 3 lulu pens).  The higher dose PA may have to be appealed after patient tries and fails 1.2 mg dosing.      Appeal Information:  If provider would like to appeal please provide a letter of medical necessity and route back to the PA team.

## 2021-06-17 NOTE — TELEPHONE ENCOUNTER
Telephone Encounter by Bernadette Ramirez at 3/22/2021 12:21 PM     Author: Bernadette Ramirez Service: -- Author Type: --    Filed: 3/22/2021 12:22 PM Encounter Date: 3/16/2021 Status: Signed    : Bernadette Ramirez APPROVED:    Approval start date: 3/18/2022  Approval end date:  3/18/2022    Pharmacy has been notified of approval and will contact patient when medication is ready for pickup.

## 2021-06-18 NOTE — PATIENT INSTRUCTIONS - HE
Patient Instructions by Saud West MD at 9/29/2020 11:30 AM     Author: Saud West MD Service: -- Author Type: Physician    Filed: 9/29/2020 12:13 PM Encounter Date: 9/29/2020 Status: Signed    : Saud West MD (Physician)       Patient Education     Abscess Drainage    An abscess is a pocket of pus that forms around an infection. Pus is a fluid made up of germs (bacteria), white blood cells, and other matter. Draining pus from an infected area or organ inside the body may be needed. This helps heal the infection. The procedure is usually done by a specially trained doctor called an interventional radiologist.  How do I get ready for abscess drainage?  Follow any instructions you are given on how to prepare, including:    Don't eat or drink anything for 6 hours before the procedure.    Tell the technologist if you are, or could be, pregnant or if you are breastfeeding.    Tell your healthcare provider and the technologist if you are allergic to X-ray dye (contrast medium) or other medicines.    Be sure your healthcare provider knows about any health conditions you have and all medicines you take. You may be told to stop taking some or all of them before the test. This includes:  ? All prescription medicines  ? Over-the-counter medicines that dont need a prescription  ? Any street drugs you may use   ? Herbs, vitamins, kelp, seaweed, cough syrups, and any other supplements  What happens during abscess drainage?    You will change into a hospital gown and lie on an X-ray table. You may lie on your back, front, or side, depending on the site of the abscess.    An IV line is put into your vein to give you fluids and medicines. You may be given medicine through the IV to help you relax.    The skin over the abscess is cleaned. A local anesthetic is applied to numb the skin.    Using CT scan, X-ray, or ultrasound images as a guide, the radiologist puts a needle through the skin and guides it to the abscess. The  needle is then replaced with a thin, flexible tube (catheter).    Pus drains from the abscess through the catheter. A bag or suction bulb will be attached to the catheter to hold the pus as it drains.    The drainage catheter may be temporarily sutured or taped to your skin to help secure it and prevent it from moving.    The entire procedure may take 30 minutes or longer, depending on the location of the abscess.  What happens after abscess drainage?    A slight fever is normal for the first 24 hours after the procedure.    The catheter and drainage bag will likely remain in place for several days. Follow any instructions you are given for caring for the catheter and drainage site.    See your healthcare provider for a follow-up appointment to check the infection and to have the catheter removed.  When to call the healthcare provider  Call your healthcare provider if you have:    Bleeding    Fever of 100.4 F (38 C) or higher, or as directed by your healthcare provider    New or worsening pain    Fluid stops draining from the tube, the drainage changes color, or the tube moves or comes out   Date Last Reviewed: 10/1/2017    0644-6960 [employer]. [address] [place] [address], [place] [address]. All rights reserved. This information is not intended as a substitute for professional medical care. Always follow your healthcare professional's instructions.

## 2021-06-20 NOTE — LETTER
Letter by Kelly Watt MD at      Author: Kelly Watt MD Service: -- Author Type: --    Filed:  Encounter Date: 9/21/2020 Status: (Other)         Saravanan Park  [address] [place] [address]             October 1, 2020         Dear Mr. Park,    Below are the results from your recent visit:    Resulted Orders   HM2(CBC w/o Differential)   Result Value Ref Range    WBC 4.9 4.0 - 11.0 thou/uL    RBC 5.28 4.40 - 6.20 mill/uL    Hemoglobin 15.3 14.0 - 18.0 g/dL    Hematocrit 46.1 40.0 - 54.0 %    MCV 87 80 - 100 fL    MCH 28.9 27.0 - 34.0 pg    MCHC 33.2 32.0 - 36.0 g/dL    RDW 12.6 11.0 - 14.5 %    Platelets 209 140 - 440 thou/uL    MPV 8.8 7.0 - 10.0 fL   Comprehensive Metabolic Panel   Result Value Ref Range    Sodium 141 136 - 145 mmol/L    Potassium 4.2 3.5 - 5.0 mmol/L    Chloride 107 98 - 107 mmol/L    CO2 24 22 - 31 mmol/L    Anion Gap, Calculation 10 5 - 18 mmol/L    Glucose 123 70 - 125 mg/dL    BUN 9 8 - 22 mg/dL    Creatinine 0.81 0.70 - 1.30 mg/dL    GFR MDRD Af Amer >60 >60 mL/min/1.73m2    GFR MDRD Non Af Amer >60 >60 mL/min/1.73m2    Bilirubin, Total 0.8 0.0 - 1.0 mg/dL    Calcium 9.8 8.5 - 10.5 mg/dL    Protein, Total 8.0 6.0 - 8.0 g/dL    Albumin 3.9 3.5 - 5.0 g/dL    Alkaline Phosphatase 40 (L) 45 - 120 U/L    AST 42 (H) 0 - 40 U/L    ALT 42 0 - 45 U/L    Narrative    Fasting Glucose reference range is 70-99 mg/dL per  American Diabetes Association (ADA) guidelines.   Lipid Cascade RANDOM   Result Value Ref Range    Cholesterol 197 <=199 mg/dL    Triglycerides 152 (H) <=149 mg/dL    HDL Cholesterol 45 >=40 mg/dL    LDL Calculated 122 <=129 mg/dL    Patient Fasting > 8hrs? No    Glycosylated Hemoglobin A1c   Result Value Ref Range    Hemoglobin A1c 9.3 (H) <=5.6 %      Comment:      Normal <5.7% Prediabete 5.7-6.4% Diabletes 6.5% or higher - adopted from ADA consensus guidelines   Microalbumin, Random Urine   Result Value Ref Range    Microalbumin, Random Urine 0.62 0.00 - 1.99 mg/dL     Creatinine, Urine 125.4 mg/dL    Microalbumin/Creatinine Ratio Random Urine 4.9 <=19.9 mg/g    Narrative    Microalbumin, Random Urine  <2.0 mg/dL . . . . . . . . Normal  3.0-30.0 mg/dL . . . . . . Microalbuminuria  >30.0 mg/dL . . . . . .  . Clinical Proteinuria    Microalbumin/Creatinine Ratio, Random Urine  <20 mg/g . . . . .. . . . Normal   mg/g . . . . . . . Microalbuminuria  >300 mg/g . . . . . . . . Clinical Proteinuria           We have attempted to reach you without success and left multiple messages hence the letter.       A1C was much better- he should continue the insulin and metformin and we will recheck this A1C  number in 3 months. His kidney function tests and cholesterol all looked good. His liver showed some  mild inflammation (but much I mproved from 1 month ago)- weight loss and healthy eating and avoiding  alcohol can continue to keep his liver healthy.       Please call with questions or contact us using [employer].    Sincerely,        Electronically signed by Kelly Watt MD

## 2021-06-20 NOTE — LETTER
Letter by Kelly Watt MD at      Author: Kelly Watt MD Service: -- Author Type: --    Filed:  Encounter Date: 8/18/2020 Status: (Other)         August 18, 2020     Patient: Saravanan Park   YOB: 1995   Date of Visit: 8/18/2020       To Whom It May Concern:    It is my medical opinion that Saravanan Park has Covid-19 and is recovering. He will be unable to work due to persistent symptoms from today 8/18/20 through 8/28/20. I will have appointment to reassess his symptoms and readiness to return to work at that time.     If you have any questions or concerns, please don't hesitate to call.    Sincerely,        Electronically signed by Kelly Watt MD

## 2021-06-20 NOTE — LETTER
Letter by Kelly Watt MD at      Author: Kelly Watt MD Service: -- Author Type: --    Filed:  Encounter Date: 8/12/2020 Status: (Other)         August 12, 2020     Patient: Saravanan Park   YOB: 1995   Date of Visit: 8/12/2020       To Whom It May Concern:    It is my medical opinion that Saravanan Park remains off work from today, 8/12/20 until 8/21/20. He was hospitalized for Covid-19 pneumonia and I will continue to assess him weekly to see when he is safe to resume work without restrictions.    If you have any questions or concerns, please don't hesitate to call.    Sincerely,        Electronically signed by Kelly Watt MD

## 2021-06-20 NOTE — LETTER
Letter by Saud West MD at      Author: Saud West MD Service: -- Author Type: --    Filed:  Encounter Date: 9/29/2020 Status: (Other)         September 29, 2020     Patient: Saravanan Park   YOB: 1995   Date of Visit: 9/29/2020       To Whom It May Concern:    It is my medical opinion that Saravanan Park may return to work on October 1, 2020.  Please excuse his absence from work on 09/28/2020 -09/30/2020.     If you have any questions or concerns, please don't hesitate to call.    Sincerely,        Electronically signed by Saud West MD

## 2021-06-20 NOTE — LETTER
Letter by Kelly Watt MD at      Author: Kelly Watt MD Service: -- Author Type: --    Filed:  Encounter Date: 8/25/2020 Status: (Other)         August 25, 2020     Patient: Saravanan Prak   YOB: 1995   Date of Visit: 8/25/2020       To Whom It May Concern:    It is my medical opinion that Saravanan Park may return to work on 8/31/20 without restrictions.    If you have any questions or concerns, please don't hesitate to call.    Sincerely,        Electronically signed by Kelly Watt MD

## 2021-06-21 NOTE — LETTER
Letter by Singh Silverio DO at      Author: Singh Silverio DO Service: -- Author Type: --    Filed:  Encounter Date: 11/30/2020 Status: (Other)         November 30, 2020     Patient: Saravanan Park   YOB: 1995   Date of Visit: 11/30/2020       To Whom It May Concern:    Please excuse Saravanan Park from work Monday, November 30th due to having a follow up appointment with me.    If you have any questions or concerns, please don't hesitate to call.    Sincerely,        Electronically signed by Singh Silverio DO

## 2021-06-21 NOTE — LETTER
Letter by Kelly Watt MD at      Author: Kelly Watt MD Service: -- Author Type: --    Filed:  Encounter Date: 1/22/2021 Status: (Other)         January 22, 2021     Patient: Saravanan Park   YOB: 1995   Date of Visit: 1/22/2021       To Whom it May Concern:    Saravanan Park was seen in my clinic on 1/22/2021.    If you have any questions or concerns, please don't hesitate to call.    Sincerely,         Electronically signed by Kelly Watt MD

## 2021-06-21 NOTE — LETTER
Letter by Kelly Watt MD at      Author: Kelly Watt MD Service: -- Author Type: --    Filed:  Encounter Date: 2/10/2021 Status: (Other)       February 10, 2021     Patient: Saravanan Park   YOB: 1995       To Whom It May Concern:    It is my medical opinion that Saravanan Park is excused from work 2/8/21-2/10/21, okay to resume work without restrictions on 2/11/21.    If you have any questions or concerns, please don't hesitate to call.    Sincerely,        Electronically signed by Kelly Watt MD

## 2021-06-29 NOTE — PROGRESS NOTES
Progress Notes by Saud West MD at 9/29/2020 11:30 AM     Author: Saud West MD Service: -- Author Type: Physician    Filed: 9/29/2020  3:59 PM Encounter Date: 9/29/2020 Status: Signed    : Saud West MD (Physician)       ASSESSMENT and plan   1. Abscess of anal and rectal regions  2 distinct abscesses noted one on the left lower buttock and the right lower buttock both measure approximately 3 x 2 cm and have been present chronically.  They are tender.  They need to be drained.  Patient understands he will need to see general surgery  - cephalexin (KEFLEX) 500 MG capsule; Take 1 capsule (500 mg total) by mouth 4 (four) times a day for 10 days.  Dispense: 40 capsule; Refill: 0  - Ambulatory referral to General Surgery    2. Abscess of groin, left  Groin abscess present approximate 3 cm superior to the left scrotum the area is tender to the touch erythematous and has been giving the patient significant discomfort he can use ibuprofen however have prescribed Keflex.  - cephalexin (KEFLEX) 500 MG capsule; Take 1 capsule (500 mg total) by mouth 4 (four) times a day for 10 days.  Dispense: 40 capsule; Refill: 0  - Ambulatory referral to General Surgery    3. Morbid obesity (H)    Patient's weight is above 300 pounds she is fluctuated between 310 to 400 pounds over most of his adult life after college.  He has been diagnosed with diabetes recently after being admitted for COVID-19.  He is interested in losing weight but says exercising is been difficult for him.  He has been going to the gym but is stopped going after his recent COVID-19 infection.  I referred him to bariatric medicine  - Ambulatory referral to Bariatric Care: Surgical and Non-Surgical    4. Cellulitis of groin    Cellulitis present in the groin area.  He is instructed to monitor himself for fever he notes no chills no nausea.  - Ambulatory referral to General Surgery        Patient Instructions     Patient Education     Abscess Drainage    An  abscess is a pocket of pus that forms around an infection. Pus is a fluid made up of germs (bacteria), white blood cells, and other matter. Draining pus from an infected area or organ inside the body may be needed. This helps heal the infection. The procedure is usually done by a specially trained doctor called an interventional radiologist.  How do I get ready for abscess drainage?  Follow any instructions you are given on how to prepare, including:    Don't eat or drink anything for 6 hours before the procedure.    Tell the technologist if you are, or could be, pregnant or if you are breastfeeding.    Tell your healthcare provider and the technologist if you are allergic to X-ray dye (contrast medium) or other medicines.    Be sure your healthcare provider knows about any health conditions you have and all medicines you take. You may be told to stop taking some or all of them before the test. This includes:  ? All prescription medicines  ? Over-the-counter medicines that dont need a prescription  ? Any street drugs you may use   ? Herbs, vitamins, kelp, seaweed, cough syrups, and any other supplements  What happens during abscess drainage?    You will change into a hospital gown and lie on an X-ray table. You may lie on your back, front, or side, depending on the site of the abscess.    An IV line is put into your vein to give you fluids and medicines. You may be given medicine through the IV to help you relax.    The skin over the abscess is cleaned. A local anesthetic is applied to numb the skin.    Using CT scan, X-ray, or ultrasound images as a guide, the radiologist puts a needle through the skin and guides it to the abscess. The needle is then replaced with a thin, flexible tube (catheter).    Pus drains from the abscess through the catheter. A bag or suction bulb will be attached to the catheter to hold the pus as it drains.    The drainage catheter may be temporarily sutured or taped to your skin to help  secure it and prevent it from moving.    The entire procedure may take 30 minutes or longer, depending on the location of the abscess.  What happens after abscess drainage?    A slight fever is normal for the first 24 hours after the procedure.    The catheter and drainage bag will likely remain in place for several days. Follow any instructions you are given for caring for the catheter and drainage site.    See your healthcare provider for a follow-up appointment to check the infection and to have the catheter removed.  When to call the healthcare provider  Call your healthcare provider if you have:    Bleeding    Fever of 100.4 F (38 C) or higher, or as directed by your healthcare provider    New or worsening pain    Fluid stops draining from the tube, the drainage changes color, or the tube moves or comes out   Date Last Reviewed: 10/1/2017    1261-2486 [employer]. [address] [place] [address]. All rights reserved. This information is not intended as a substitute for professional medical care. Always follow your healthcare professional's instructions.               Orders Placed This Encounter   Procedures   ? Ambulatory referral to General Surgery     Referral Priority:   Routine     Referral Type:   Surgical     Referral Reason:   Evaluation and Treatment     Requested Specialty:   General Surgery     Number of Visits Requested:   1   ? Ambulatory referral to Bariatric Care: Surgical and Non-Surgical     Referral Priority:   Routine     Referral Type:   Bariatric     Referral Reason:   Evaluation and Treatment     Number of Visits Requested:   1     There are no discontinued medications.    No follow-ups on file.    CHIEF COMPLAINT:  Chief Complaint   Patient presents with   ? testicle concerns     x 1 month       HISTORY OF PRESENT ILLNESS:  Saravanan is a 25 y.o. male who is here because of pain he is experiencing in his buttock area bilaterally for many years which is been coming and  "going with association with 2 masses in that area he also notes tenderness and swelling above his left scrotum for the last 2 months he did not bring this up at his last physical with his PCP.  He reports that he is embarrassed about it.  He says the pain in his groin on the left side is getting worse and it is difficult when he sits down or stands up suddenly.  He has missed 3 days of work because of discussed discomfort and is been trying to manage the discomfort taking ibuprofen 3-4 times per day.  He is recently diagnosed with diabetes and is trying to control his blood sugars.  He denies any fever chills nausea.    REVIEW OF SYSTEMS:     GI positive for lower abdominal pain   positive for pain in the scrotum and on his buttocks bilaterally  10 point review of  All other systems are negative.    PFSH:    He works in [employer]    TOBACCO USE:  Social History     Tobacco Use   Smoking Status Passive Smoke Exposure - Never Smoker   Smokeless Tobacco Never Used   Tobacco Comment    family smokes outside home       VITALS:  Vitals:    09/29/20 1141   BP: 134/86   Pulse: 80   Resp: 18   Temp: 98.4  F (36.9  C)   TempSrc: Oral   Weight: (!) 323 lb 1 oz (146.5 kg)   Height: 5' 9\" (1.753 m)     Wt Readings from Last 3 Encounters:   09/29/20 (!) 323 lb 1 oz (146.5 kg)   09/18/20 (!) 320 lb (145.2 kg)   08/07/20 (!) 309 lb 14.4 oz (140.6 kg)       PHYSICAL EXAM:  Interactive male sitting in exam room no acute distress  HEENT neck supple mucous members moist there is no lymph enlargement noted in the neck  Respiratory system clear to auscultation equal breath sounds no wheeze no crackles  Abdomen soft is no focal tenderness.  No hepatosplenomegaly bowel sounds are present   no tenderness over the scrotum he does have tenderness of the inguinal area approximately 3 cm superior to the scrotum on the left side.  He has multiple cysts present on his buttocks bilaterally and 2 well demarcated abscesses measuring 2 x 3 " cm on the inferior aspect of his left and right buttocks bilaterally the areas are tender.  Skin erythema noted in the left inguinal area.  dATA REVIEWED:  Additional History from Old Records Summarized (2): Reviewed last PCP note after being hospitalized with COVID-19.  Decision to Obtain Records (1): 0  Radiology Tests Summarized or Ordered (1): 0  Labs Reviewed or Ordered (1):  last lab tests reviewed with patient including last A1c  Medicine Test Summarized or Ordered (1): 0  Independent Review of EKG or X-RAY(2 each): 0    The visit lasted a total of 21 minutes face to face with the patient. Over 50% of the time was spent counseling and educating the patient about   Abscesses cellulitis and morbid obesity.    MEDICATIONS:  Current Outpatient Medications   Medication Sig Dispense Refill   ? insulin lispro (HUMALOG KWIKPEN) 100 unit/mL pen Inject 10 Units under the skin 3 (three) times a day with meals. Check blood sugar four (4) times daily.  11.65 Type 2 with hyperglycemia  BD Ultra-fine Maya Pen Needles - NDC [address]-70 - dispense 1 case, refill PRN for 1 year  Accu-chek Guide test strips (50 ct. boxes) - dispense 1, refill PRN for 1 year  Accu-chek FastClix lancets (box of 102 ct.) - dispense 1, refill PRN for 1 year 9 mL 11   ? LANTUS SOLOSTAR U-100 INSULIN 100 unit/mL (3 mL) pen Inject 30 Units under the skin at bedtime. 11.65 Type 2 with hyperglycemia  Contact provider if insulin prescribed is not the preferred insulin per insurance. 9 mL 11   ? metFORMIN (GLUCOPHAGE XR) 500 MG 24 hr tablet Take 1 tablet by mouth twice daily for 7 days, then increase to 2 tablets by mouth twice daily 120 tablet 11   ? cephalexin (KEFLEX) 500 MG capsule Take 1 capsule (500 mg total) by mouth 4 (four) times a day for 10 days. 40 capsule 0     No current facility-administered medications for this visit.      wil abarca md

## 2021-07-03 NOTE — ADDENDUM NOTE
Addendum Note by Kelly Watt MD at 12/3/2020  3:00 PM     Author: Kelly Watt MD Service: -- Author Type: Physician    Filed: 12/4/2020  8:45 AM Encounter Date: 12/3/2020 Status: Signed    : Kelly Watt MD (Physician)    Addended by: KELLY WATT on: 12/4/2020 08:45 AM        Modules accepted: Orders

## 2021-07-07 ENCOUNTER — OFFICE VISIT - HEALTHEAST (OUTPATIENT)
Dept: FAMILY MEDICINE | Facility: CLINIC | Age: 26
End: 2021-07-07

## 2021-07-07 DIAGNOSIS — E11.9 DIABETES MELLITUS WITHOUT COMPLICATION (H): ICD-10-CM

## 2021-07-07 DIAGNOSIS — E11.65 TYPE 2 DIABETES MELLITUS WITH HYPERGLYCEMIA, UNSPECIFIED WHETHER LONG TERM INSULIN USE (H): ICD-10-CM

## 2021-07-07 DIAGNOSIS — Z11.59 NEED FOR HEPATITIS C SCREENING TEST: ICD-10-CM

## 2021-07-07 DIAGNOSIS — E66.01 MORBID OBESITY (H): ICD-10-CM

## 2021-07-07 DIAGNOSIS — R03.0 ELEVATED BP WITHOUT DIAGNOSIS OF HYPERTENSION: ICD-10-CM

## 2021-07-07 DIAGNOSIS — E55.9 VITAMIN D DEFICIENCY: ICD-10-CM

## 2021-07-07 LAB
FERRITIN SERPL-MCNC: 721 NG/ML (ref 27–300)
HBA1C MFR BLD: 7.2 %
PTH-INTACT SERPL-MCNC: 48 PG/ML (ref 10–86)

## 2021-07-07 ASSESSMENT — MIFFLIN-ST. JEOR: SCORE: 2435.03

## 2021-07-07 NOTE — PATIENT INSTRUCTIONS - HE
Patient Instructions by Kelly Watt MD at 7/7/2021  3:40 PM     Author: Kelly Watt MD Service: -- Author Type: Physician    Filed: 7/7/2021  4:20 PM Encounter Date: 7/7/2021 Status: Signed    : Kelly Watt MD (Physician)       Check blood pressure at home 1-2 times per week and send me the #'s on my chart

## 2021-07-08 ENCOUNTER — COMMUNICATION - HEALTHEAST (OUTPATIENT)
Dept: SURGERY | Facility: CLINIC | Age: 26
End: 2021-07-08

## 2021-07-08 DIAGNOSIS — E66.01 OBESITY, MORBID, BMI 50 OR HIGHER (H): ICD-10-CM

## 2021-07-08 LAB
25(OH)D3 SERPL-MCNC: 15.9 NG/ML (ref 30–80)
HCV AB SERPL QL IA: NEGATIVE

## 2021-07-08 NOTE — TELEPHONE ENCOUNTER
Telephone Encounter by Kaylen Hernandez RN at 7/8/2021  1:29 PM     Author: Kaylen Hernandez RN Service: -- Author Type: Registered Nurse    Filed: 7/8/2021  1:29 PM Encounter Date: 7/8/2021 Status: Signed    : Kaylen Hernandez RN (Registered Nurse)       Patient needs some follow-up.  Dr. Rodriguez had wanted to see him back in 4 weeks which would have been in March.  [employer] Message sent to the patient.  Kaylen Hernandez RN

## 2021-07-08 NOTE — PROGRESS NOTES
"Progress Notes by Kelly Watt MD at 7/7/2021  3:40 PM     Author: Kelly Watt MD Service: -- Author Type: Physician    Filed: 7/7/2021 10:33 PM Encounter Date: 7/7/2021 Status: Signed    : Kelly Watt MD (Physician)       SUBJECTIVE  Saravanan Park is a 26 y.o. male here for:    Chief Complaint   Patient presents with   ? Diabetes     Last A1C 8.3. He took metformin for about 1.5 months after his last visit in March but then he stopped taking. He does get some stomach upset/diarrhea from the metformin. He continues to take victoza and phentermine. He had one visit with bariatric medicine and was started on these medications. He feels the phentermine is helping with appetite suppression. He has also been fasting- won't eat until 4pm when he gets home from work and then he will eat either 1 or 2 meals before sleeping around 8pm. He does still drink EtOH on weekends- but has cut back to maybe once per weekend. He has tried to go to gym on some Friday evenings. Has lost around 20 lbs.      ROS  See HPI    Reviewed Past Medical History, Medications, Family History and Social History in Epic and up to date with no new changes.    OBJECTIVE  BP (!) 140/100   Pulse (!) 111   Temp 99  F (37.2  C) (Oral)   Ht 5' 9\" (1.753 m)   Wt (!) 324 lb (147 kg)   SpO2 98%   BMI 47.85 kg/m       General: Cooperative, pleasant, in no acute distress    LABS & IMAGES   Results for orders placed or performed in visit on 07/07/21   Glycosylated Hemoglobin A1c   Result Value Ref Range    Hemoglobin A1c 7.2 (H) <=5.6 %   Parathyroid Hormone Intact   Result Value Ref Range    PTH 48 10 - 86 pg/mL         ASSESSMENT/PLAN:   Saravanan was seen today for diabetes.    Diagnoses and all orders for this visit:    Diabetes mellitus without complication (H): A1C improved, down to 7.2- almost at goal. He is taking victoza only- encouraged to restart metformin and titrate up slowly to avoid GI side effects. Continue lifestyle " modifications. Check A1C in 3 months at annual preventive visit.  -     JIC RED  -     JIC LAV  -     Glycosylated Hemoglobin A1c    Obesity, morbid, BMI 47.85 (H): Has lost 20 lbs since last visit- he has changed diet (fasting) and also started phentermine and victoza. He had one visit with bariatric clinic- he is not interested in surgical treatment but is interested in continuing medication since the phentermine has significantly reduced his appetite. Will check repeat labs as recommended by bariatric clinic. Encouraged cutting back on EtOH.  -     Ferritin  -     Parathyroid Hormone Intact  -     Thiamin (Vitamin B1), Whole Blood; Future    Elevated BP without diagnosis of hypertension: BP elevated on 2 separate checks today. Borderline BP in the past. Reviewed treatment options. He will check ambulatory readings and send me [employer] message with his readings. He has lost weight and is motivated to continue aerobic exercise and dietary changes. Encouraged cutting back on EtOH as well. If still elevated despite lifestyle modifications at his upcoming physical in 3 months, then would consider treatment with medication    Type 2 diabetes mellitus with hyperglycemia, unspecified whether long term insulin use (H)  -     metFORMIN (GLUCOPHAGE XR) 500 MG 24 hr tablet; Take 1 tablet by mouth twice daily for 7 days, then increase to 2 tablets by mouth twice daily    Need for hepatitis C screening test  -     Hepatitis C Antibody (Anti-HCV)    Vitamin D deficiency: s/p repletion per bariatric clinic. Per notes, recommended follow-up vitamin D testing in June- will check this today.  -     Vitamin D, Total (25-Hydroxy)          30 minutes spent on the date of the encounter doing chart review, history and exam, documentation and further activities as noted above        Options for treatment and follow-up care were reviewed with the patient. Saravanan Park and/or guardian was engaged and actively involved in the decision  making process. Saravanan Park and/or guardian verbalized understanding of the options discussed and was satisfied with the final plan.      Kelly Watt MD

## 2021-07-10 VITALS
TEMPERATURE: 99 F | HEIGHT: 69 IN | OXYGEN SATURATION: 98 % | WEIGHT: 315 LBS | HEART RATE: 111 BPM | BODY MASS INDEX: 46.65 KG/M2 | SYSTOLIC BLOOD PRESSURE: 140 MMHG | DIASTOLIC BLOOD PRESSURE: 100 MMHG

## 2021-07-14 PROBLEM — J96.01 ACUTE RESPIRATORY FAILURE WITH HYPOXIA (H): Status: RESOLVED | Noted: 2020-08-04 | Resolved: 2020-08-12

## 2021-07-22 NOTE — LETTER
Letter by Kelly Watt MD at      Author: Kelly Watt MD Service: -- Author Type: --    Filed:  Encounter Date: 7/7/2021 Status: (Other)         July 7, 2021     Patient: Saravanan Park   YOB: 1995   Date of Visit: 7/7/2021       To Whom it May Concern:    Saravanan Park was seen in my clinic on 7/7/2021.    If you have any questions or concerns, please don't hesitate to call.    Sincerely,         Electronically signed by Kelly Watt MD

## 2021-09-07 DIAGNOSIS — Z11.59 ENCOUNTER FOR SCREENING FOR OTHER VIRAL DISEASES: ICD-10-CM

## 2021-09-26 ENCOUNTER — HEALTH MAINTENANCE LETTER (OUTPATIENT)
Age: 26
End: 2021-09-26

## 2021-10-01 ENCOUNTER — TELEPHONE (OUTPATIENT)
Dept: SURGERY | Facility: CLINIC | Age: 26
End: 2021-10-01

## 2021-10-01 ENCOUNTER — OFFICE VISIT (OUTPATIENT)
Dept: SURGERY | Facility: CLINIC | Age: 26
End: 2021-10-01
Payer: COMMERCIAL

## 2021-10-01 VITALS
DIASTOLIC BLOOD PRESSURE: 78 MMHG | SYSTOLIC BLOOD PRESSURE: 130 MMHG | HEIGHT: 69 IN | WEIGHT: 315 LBS | BODY MASS INDEX: 46.65 KG/M2

## 2021-10-01 DIAGNOSIS — E66.01 OBESITY, MORBID, BMI 50 OR HIGHER (H): Primary | ICD-10-CM

## 2021-10-01 DIAGNOSIS — E11.65 TYPE 2 DIABETES MELLITUS WITH HYPERGLYCEMIA, WITHOUT LONG-TERM CURRENT USE OF INSULIN (H): ICD-10-CM

## 2021-10-01 PROCEDURE — 99214 OFFICE O/P EST MOD 30 MIN: CPT | Performed by: EMERGENCY MEDICINE

## 2021-10-01 RX ORDER — METFORMIN HCL 500 MG
TABLET, EXTENDED RELEASE 24 HR ORAL
COMMUNITY
Start: 2021-07-07 | End: 2023-01-20

## 2021-10-01 RX ORDER — PHENTERMINE HYDROCHLORIDE 37.5 MG/1
TABLET ORAL
Qty: 30 TABLET | Refills: 0 | Status: SHIPPED | OUTPATIENT
Start: 2021-10-01 | End: 2021-12-02

## 2021-10-01 RX ORDER — LIRAGLUTIDE 6 MG/ML
INJECTION SUBCUTANEOUS
COMMUNITY
Start: 2021-02-15 | End: 2021-10-01

## 2021-10-01 RX ORDER — LIRAGLUTIDE 6 MG/ML
1.8 INJECTION SUBCUTANEOUS DAILY
Qty: 27 ML | Refills: 3 | Status: SHIPPED | OUTPATIENT
Start: 2021-10-01 | End: 2021-12-02

## 2021-10-01 RX ORDER — LANOLIN ALCOHOL/MO/W.PET/CERES
CREAM (GRAM) TOPICAL
COMMUNITY
Start: 2021-03-24 | End: 2021-12-02

## 2021-10-01 ASSESSMENT — MIFFLIN-ST. JEOR: SCORE: 2503.98

## 2021-10-01 NOTE — LETTER
[facility] [address]  [facility] [address]  [phone]          October 1, 2021    RE:  Saravanan Park                                                                                                                                                       [address] [place] [address]            To whom it may concern:    Saravanan Park is under my professional care for    Obesity, morbid, BMI 50 or higher (H)  Type 2 diabetes mellitus with hyperglycemia, without long-term current use of insulin (H) He  may return to work with the following: The employee is ABLE to return to work today.    When the patient returns to work, no new restrictions.    Sincerely,        Dru Rodriguez MD

## 2021-10-01 NOTE — LETTER
10/1/2021         RE: Saravanan Park  [address] [place] [address]        Dear Colleague,    Thank you for referring your patient, Saravanan Park, to the [facility] [place]. Please see a copy of my visit note below.    Bariatric Clinic Follow-Up Visit:    Saravanan Park is a 26 year old  male with Body mass index is 50.09 kg/m .  presenting here today for follow-up on non-surgical efforts for weight loss. Original Intake visit occurred on 2521 with a weight of 344 ;bslbs and BMI of 50.9.  Along with diet and behavior changes, he has been using victoza and phentermine to assist his weight loss goals.  See his intake visit notes for details on identified contributors to weight gain in the past. Chart review shows no dietician visit ever and protein we'll shoot for an initial protein daily goal of 75-95g/day.    Weight:   Wt Readings from Last 3 Encounters:   10/01/21 (!) 153.9 kg (339 lb 3.2 oz)   07/07/21 147 kg (324 lb)   03/17/21 (!) 157.4 kg (347 lb)    pounds      Comorbidities:  Patient Active Problem List   Diagnosis     Morbid obesity (H)       Current Outpatient Medications:      cholecalciferol 50 MCG (2000 UT) CAPS, One capsule daily to maintain vitamin D levels., Disp: , Rfl:      liraglutide (VICTOZA) 18 MG/3ML solution, Start 0.1ml injected daily for one week, then increase to 0.2ml (1.2mg) daily thereafter if tolerated., Disp: , Rfl:      metFORMIN (GLUCOPHAGE-XR) 500 MG 24 hr tablet, Take 1 tablet by mouth twice daily for 7 days, then increase to 2 tablets by mouth twice daily, Disp: , Rfl:      thiamine (B-1) 100 MG tablet, Take once daily for 30 days then decrease to once weekly thereafter until finished., Disp: , Rfl:       Interim: Since our last visit, he has had some increased weight since June/July of 10 lbs    Plan:   1.  Diet: follow up with dietician. Pursuing non surgical weight loss now. To start, increase mealtime protein and timing of  "meals:  25-30grams of protein with each meal every 5-6 hours ideally.   2.exercise: aiming for some strength work and 150minutes/week  3. Medication: phentermine refilled, don't start until after your procedure next week, restart at half a tablet after breakfast for at least a month before increasing to one full tablet daily if tolerated. Reduce back down to half a tablet if over stimulation/mood swings, insomnia. Refilled victoza, continue 1.8mg/day.  4. Aiming to see about a 2-3 lb reduction weekly if on track. A food journal can help and answering the question each day of, \" was I in control of my appetite\" or was I reacting to it.  5. Recheck with me in about 6-8 weeks, call/message if issues arise.      We discussed [facility] Bariatric Basics including:  -eating 3 meals daily  -reviewed metabolic needs for weight loss based on Resting Metabolic Rate  -protein goals supportive of healthy weight loss  -avoiding/limiting calorie containing beverages  -We discussed the importance of restorative sleep and stress management in maintaining a healthy weight.  -We discussed the National Weight Control Registry healthy weight maintenance strategies and ways to optimize metabolism.  -We discussed the importance of physical activity including cardiovascular and strength training in maintaining a healthier weight and explored viable options.    Most recent labs:  Lab Results   Component Value Date    WBC 6.9 01/22/2021    HGB 15.6 01/22/2021    HCT 47.0 01/22/2021    MCV 87 01/22/2021     01/22/2021     Lab Results   Component Value Date    CHOL 197 09/18/2020     Lab Results   Component Value Date    HDL 45 09/18/2020     No components found for: LDLCALC  Lab Results   Component Value Date    TRIG 152 (H) 09/18/2020     No components found for: CHOLHDL  Lab Results   Component Value Date    ALT 33 03/17/2021    AST 27 03/17/2021    ALKPHOS 43 (L) 03/17/2021     No results found for: HGBA1C  No components found for: " "HQISSEFY98  No components found for: TBGFNLZI22SX  No components found for: FERRITIN  No components found for: PTH  No components found for: 34848  No components found for: 7597  Lab Results   Component Value Date    TSH 2.20 03/17/2021     No results found for: TESTOSTERONE    DIETARY HISTORY    Positive Changes Since Last Visit: has tried to go without phentermine. Continues to use victoza  Struggling With: increased snacking on intermittent fasting so we discussed more daily restriction model today.    Knowledgeable in Reading Food Labels: reviewed goals today  Getting Adequate Protein: no  Sleeping 7-8 hours/day often  Has perianal procedure next week with Dr. Kwok again.    PHYSICAL ACTIVITY PATTERNS:  Cardiovascular: warehouse work  Strength Training: same    REVIEW OF SYSTEMS  No fever. A1c improving this summer. Tolerated medication well without upset stomach or over stimulation..  PHYSICAL EXAM:  Vitals: /78   Ht 1.753 m (5' 9\")   Wt (!) 153.9 kg (339 lb 3.2 oz)   BMI 50.09 kg/m    Weight:   Wt Readings from Last 3 Encounters:   10/01/21 (!) 153.9 kg (339 lb 3.2 oz)   07/07/21 147 kg (324 lb)   03/17/21 (!) 157.4 kg (347 lb)         GEN: Pleasant, well groomed, in no acute distress   .  NECK: No swelling.  HEART: RRR.  LUNGS: No respiratory difficulty noted. No cough. .  ABDOMEN: obese, central adiposity.nontender..  EXTREMITIES: No tremor. Ambulation is independent..  NEURO: Alert and Oriented X3, fluent speech.  SKIN: No visible rashes.    Medical Decision Making:  Interim study results: A1c down to 7.2% in July.. vitamin D improving. 15.9(8.8 start).       30 minutes spent on the date of the encounter doing chart review, history and exam, documentation and further activities per the note   Dru Rodriguez MD  [facility]  11:08 AM  10/1/2021      Again, thank you for allowing me to participate in the care of your patient.        Sincerely,        Dru Rodriguez MD    "

## 2021-10-01 NOTE — PROGRESS NOTES
Bariatric Clinic Follow-Up Visit:    Saravanan Park is a 26 year old  male with Body mass index is 50.09 kg/m .  presenting here today for follow-up on non-surgical efforts for weight loss. Original Intake visit occurred on 2521 with a weight of 344 ;bslbs and BMI of 50.9.  Along with diet and behavior changes, he has been using victoza and phentermine to assist his weight loss goals.  See his intake visit notes for details on identified contributors to weight gain in the past. Chart review shows no dietician visit ever and protein we'll shoot for an initial protein daily goal of 75-95g/day.    Weight:   Wt Readings from Last 3 Encounters:   10/01/21 (!) 153.9 kg (339 lb 3.2 oz)   07/07/21 147 kg (324 lb)   03/17/21 (!) 157.4 kg (347 lb)    pounds      Comorbidities:  Patient Active Problem List   Diagnosis     Morbid obesity (H)       Current Outpatient Medications:      cholecalciferol 50 MCG (2000 UT) CAPS, One capsule daily to maintain vitamin D levels., Disp: , Rfl:      liraglutide (VICTOZA) 18 MG/3ML solution, Start 0.1ml injected daily for one week, then increase to 0.2ml (1.2mg) daily thereafter if tolerated., Disp: , Rfl:      metFORMIN (GLUCOPHAGE-XR) 500 MG 24 hr tablet, Take 1 tablet by mouth twice daily for 7 days, then increase to 2 tablets by mouth twice daily, Disp: , Rfl:      thiamine (B-1) 100 MG tablet, Take once daily for 30 days then decrease to once weekly thereafter until finished., Disp: , Rfl:       Interim: Since our last visit, he has had some increased weight since June/July of 10 lbs    Plan:   1.  Diet: follow up with dietician. Pursuing non surgical weight loss now. To start, increase mealtime protein and timing of meals:  25-30grams of protein with each meal every 5-6 hours ideally.   2.exercise: aiming for some strength work and 150minutes/week  3. Medication: phentermine refilled, don't start until after your procedure next week, restart at half a tablet after breakfast for at  "least a month before increasing to one full tablet daily if tolerated. Reduce back down to half a tablet if over stimulation/mood swings, insomnia. Refilled victoza, continue 1.8mg/day.  4. Aiming to see about a 2-3 lb reduction weekly if on track. A food journal can help and answering the question each day of, \" was I in control of my appetite\" or was I reacting to it.  5. Recheck with me in about 6-8 weeks, call/message if issues arise.      We discussed [facility]ariat[facility] Basics including:  -eating 3 meals daily  -reviewed metabolic needs for weight loss based on Resting Metabolic Rate  -protein goals supportive of healthy weight loss  -avoiding/limiting calorie containing beverages  -We discussed the importance of restorative sleep and stress management in maintaining a healthy weight.  -We discussed the National Weight Control Registry healthy weight maintenance strategies and ways to optimize metabolism.  -We discussed the importance of physical activity including cardiovascular and strength training in maintaining a healthier weight and explored viable options.    Most recent labs:  Lab Results   Component Value Date    WBC 6.9 01/22/2021    HGB 15.6 01/22/2021    HCT 47.0 01/22/2021    MCV 87 01/22/2021     01/22/2021     Lab Results   Component Value Date    CHOL 197 09/18/2020     Lab Results   Component Value Date    HDL 45 09/18/2020     No components found for: LDLCALC  Lab Results   Component Value Date    TRIG 152 (H) 09/18/2020     No components found for: CHOLHDL  Lab Results   Component Value Date    ALT 33 03/17/2021    AST 27 03/17/2021    ALKPHOS 43 (L) 03/17/2021     No results found for: HGBA1C  No components found for: [id number]  No components found for: [id number]  No components found for: FERRITIN  No components found for: PTH  No components found for: 93756  No components found for: 7597  Lab Results   Component Value Date    TSH 2.20 03/17/2021     No results found for: " "TESTOSTERONE    DIETARY HISTORY    Positive Changes Since Last Visit: has tried to go without phentermine. Continues to use victoza  Struggling With: increased snacking on intermittent fasting so we discussed more daily restriction model today.    Knowledgeable in [place] [employer]: reviewed goals today  Getting Adequate Protein: no  Sleeping 7-8 hours/day often  Has perianal procedure next week with Dr. Kwok again.    PHYSICAL ACTIVITY PATTERNS:  Cardiovascular: warehouse work  Strength Training: same    REVIEW OF SYSTEMS  No fever. A1c improving this summer. Tolerated medication well without upset stomach or over stimulation..  PHYSICAL EXAM:  Vitals: /78   Ht 1.753 m (5' 9\")   Wt (!) 153.9 kg (339 lb 3.2 oz)   BMI 50.09 kg/m    Weight:   Wt Readings from Last 3 Encounters:   10/01/21 (!) 153.9 kg (339 lb 3.2 oz)   07/07/21 147 kg (324 lb)   03/17/21 (!) 157.4 kg (347 lb)         GEN: Pleasant, well groomed, in no acute distress   .  NECK: No swelling.  HEART: RRR.  LUNGS: No respiratory difficulty noted. No cough. .  ABDOMEN: obese, central adiposity.nontender..  EXTREMITIES: No tremor. Ambulation is independent..  NEURO: Alert and Oriented X3, fluent speech.  SKIN: No visible rashes.    Medical Decision Making:  Interim study results: A1c down to 7.2% in July.. vitamin D improving. 15.9(8.8 start).       30 minutes spent on the date of the encounter doing chart review, history and exam, documentation and further activities per the note   Dru Rodriguez MD  [facility]  11:08 AM  10/1/2021  "

## 2021-10-01 NOTE — PATIENT INSTRUCTIONS
"Plan:   1.  Diet: follow up with dietician. Pursuing non surgical weight loss now. To start, increase mealtime protein and timing of meals:  25-30grams of protein with each meal every 5-6 hours ideally.   2.exercise: aiming for some strength work and 150minutes/week  3. Medication: phentermine refilled, don't start until after your procedure next week, restart at half a tablet after breakfast for at least a month before increasing to one full tablet daily if tolerated. Reduce back down to half a tablet if over stimulation/mood swings, insomnia. Refilled victoza, continue 1.8mg/day.  4. Aiming to see about a 2-3 lb reduction weekly if on track. A food journal can help and answering the question each day of, \" was I in control of my appetite\" or was I reacting to it.  5. Recheck with me in about 6-8 weeks, call/message if issues arise.          LEAN PROTEIN SOURCES  Getting 20-30 grams of protein, 3 meals daily, is appropriate for most people, some need more but more than about 40 grams per meal is not useful.  General rule is drinking one ounce of water per gram of protein eaten over the course of the day:  70 grams of protein each day, drink 70 oz of water.  Protein Source Portion Calories Grams of Protein                           Nonfat, plain Greek yogurt    (10 grams sugar or less) 3/4 cup (6 oz)  12-17   Light Yogurt (10 grams sugar or less) 3/4 cup (6 oz)  6-8   Protein Shake 1 shake 110-180 15-30   Skim/1% Milk or lactose-free milk 1 cup ( 8 oz)  8   Plain or light, flavored soymilk 1 cup  7-8   Plain or light, hemp milk 1 cup 110 6   Fat Free or 1% Cottage Cheese 1/2 cup 90 15   Part skim ricotta cheese 1/2 cup 100 14   Part skim or reduced fat cheese slices 1 ounce 65-80 8     Mozzarella String Cheese 1 80 8   Canned tuna, chicken, crab or salmon  (canned in water)  1/2 cup 100 15-20   White fish (broiled, grilled, baked) 3 ounces 100 21   [place]/Tuna (broiled, grilled, baked) 3 " ounces 150-180 21   Shrimp, Scallops, Lobster, Crab 3 ounces 100 21   Pork loin, Pork Tenderloin 3 ounces 150 21   Boneless, skinless chicken /turkey breast                          (broiled, grilled, baked) 3 ounces 120 21   [place], [place], [place], and Venison 3 ounces 120 21   Lean cuts of red meat and pork (sirloin,   round, tenderloin, flank, ground 93%-96%) 3 ounces 170 21   Lean or Extra Lean Ground Turkey 1/2 cup 150 20   90-95% Lean [place] Burger 1 rachel 140-180 21   Low-fat casserole with lean meat 3/4 cup 200 17   Luncheon Meats                                                        (turkey, lean ham, roast beef, chicken) 3 ounces 100 21   Egg (boiled, poached, scrambled) 1 Egg 60 7   Egg Substitute 1/2 cup 70 10   Nuts (limit to 1 serving per day)  3 Tbsp. 150 7   Nut [place] (peanut, almond)  Limit to 1 serving or less daily 1 Tbsp. 90 4   Soy Burger (varies) 1  15   Garbanzo, Black, Gamez Beans 1/2 cup 110 7   Refried Beans 1/2 cup 100 7   Kidney and Lima beans 1/2 cup 110 7   Tempeh 3 oz 175 18   Vegan crumbles 1/2 cup 100 14   Tofu 1/2 cup 110 14   Chili (beans and extra lean beef or turkey) 1 cup 200 23   Lentil Stew/Soup 1 cup 150 12   Black Bean Soup 1 cup 175 12         Example Meal Plan for a 5976-7452 Calorie Diet:  Coe, add 30% more to portions listed below, aiming for 1800-2000kcal/day and 75-90g of lean protein.    In order to fuel your weight loss properly and avoid hunger-induced overeating later in the day, for your height and weight, you will enjoy the most success by following the diet below or similar with adjustments based on your particular tastes and preferences.  Exercise may influence speed, amount of weight loss further.     I recommend getting into a meal routine and keeping it similar day to day in the beginning so you don t have to think too hard about what you re going to make/eat.  Keep snacks healthy, ideally containing protein and some vegetables.  Non-processed food  is preferable to packaged items.  Eat at least a few crunchy green vegetables if having a snack, which should be 2-3 hours after your mealtimes(prepare these ahead of time for ease of use).  Drink 64 oz -80 oz of water daily for most, some of you will need more and we'll discuss it at your visit if that is the case.  When changing our diet and reducing our intake,  we can often mistake thirst for hunger or just have some grazing habits we have to break ('bored/mindless eating') and a glass of water and reconsideration of our hunger is often all that is needed.  Having the urge is not the problem, but watching it pass by without acting on it is the goal.    If you re having hunger problems, add a protein drink/snack to your morning hours or afternoon snack with at least 20grams of protein and not too much sugar (under 10g).  A carton of higher protein/low sugar yogurt can work as well.  If the urge to snack is overwhelming and not satiated, try going for a 10 minute walk/exercise, come home and drink a glass of water and if still hungry, have a  calorie snack (handful of raw/sprouted nuts, veggies and string cheese, protein bar, etc).  Savor it.    It is better to have a large breakfast, a moderate lunch and a smaller dinner to fuel your day.  People lose 10-15% more weight during their weight loss season with this strategy. Optimizing your protein intake at each meal will further keep you more satisfied while eating less food overall.  Getting exercise in early has also been shown to offer the best results (before breakfast ideally but anytime is the right time to exercise if that is not an option for you).    To make sure you re getting adequate vitamins and minerals during weight loss, I recommend one complete multivitamin a day of your choice.  Consider a probiotic and taking some vitamin D 2000 IU daily.    Let supper be your last meal of the day and ideally try to have at least 12 hours between supper and  breakfast the next day to tap into some beneficial overnight fasting dynamics.  Water in the evening is fine, unsweetened, non caffeinated herbal tea is helpful as well.  Consolidating your meals within a 8-12 hour period of your day will help tap into these additional metabolic benefits and tends to keep your appetite up for breakfast, further helping to stay on track.  For most of my patients I don't recommend an intermittent fasting style diet (many find it hard to fit in their lifestyle) but an overnight fast is very doable for most patients and helps regulate our hunger drives a little better.  This makes it very important to nail good intake at all three meals to feel satisfied/energized and still lose weight.  If evening snacking desires are high, consider a glass of fiber supplement for some additional fullness (metamucil or similar). Most of us don't get the 25-30 grams per day of fiber that promotes good gut health/satiety.  Benefiber, metamucil, citrucel are reasonable/affordable options for most people.  Inulin, chicory, psyllium husk are reasonable options but start slow and low in the dose to avoid gas/bloating until your gut gets acclimated (ramping up to 5-10 grams per day of supplemental fiber after 3-4 weeks if needed).      Example Meal Plan:  Breakfast: 450-475 Calories  1 egg cooked on low in olive oil:   calories.  5oz Greek Yogurt (Fage plain classic: ~150 tia)  Handful of Berries of your choice (about a calorie per berry or 20-40cal per handful)    cup(cooked) of  old fashioned oatmeal or 1/2 cup(cooked) steel cut oats. (150 tia)  Sprinkle amount of brown sugar and a pat of butter. (40 tia)  Glass of  Water  Black coffee or unsweetened Tea (0calories).      2-3 hours Later Snack: (195 calories).  Glass of water  One string Cheese (80 calories) or 4 oz creamed cottage cheese (115 calories) with  Crunchy Celery sticks (less than 10 calories per large stalk) 2 stalks. (20 calories)    of a   Large Banana or   of a Large Apple (60 calories):  eat second half at lunch or afternoon snack.     Lunch:300 -350 calories   Chicken Breast  (baked/broiled/roasted/grilled)  4-6 oz.  (125-180 tia), BBQ sauce/hot sauce/mustard/seasoning is free. Just use a reasonable amount. Or a can of tuna with 1 tablespoon mayonnaise.  Salad: lettuce, any other veggies (cucumbers, green peppers/celery you like and a small drizzle of dressing to just flavor.  Go as big on the veggies as you like,  as they are practically calorie free.   A whole, 8 inch cucumber is 45 calories, a whole green pepper is 23 calories, a stalk of celery is 9 calories.  [place] Dressing is 60 calories per tablespoon..so moderate your desired dressing or do a drizzle of olive oil and splash of balsamic vinegar on top,  Total calories unlikely to be over 150 even with dressing.  Glass of Water.    Option for lunch is meal replacement protein drink/smoothie.  Need at least 20 grams of protein and eat the rest of your apple/banana from the morning snack.      Afternoon Snack: 150-200 calories   Cheese Stick or cottage cheese again  and a fresh fruit OR  Granola Bar (protein Bar acceptable if under 200 calories OR  Homemade smoothies:  8oz skim milk,  a handful of berries (fresh or frozen and a serving of protein powder such as BiPro or Lisa sWhey for example.  If you don't like dairy, make with 8oz water, one small banana, handful of berries and the protein powder, add any veggies you want as well:  roughly 200 calories.   Glass of Water    Dinner: 325 calories  4oz of fresh, [place] salmon.  Broiled (salt/pepper/dill) for about 8-8.5 minutes (200calories) or  4oz filet mignon steak or sirloin steak  Salad or vegetable sautéed lightly in olive oil or   Broccoli 1.5  cups chopped and steamed  or micro-waved in a little water (75 calories)  Glass of Water,    Cup of herbal tea (unsweetened, caffeine free)      Herbs and seasonings are encouraged to  "flavor your foods/vegetables.  Make your food delicious.      Tips for Success:  1.  Prepare proteins ahead of time (broil chicken breasts in bulk so you can grab and go), steel cut oats/lentils can be stored in casserole dish/bowl in the fridge for quick scoop in the morning and rewarm in microwave, make use of crock pot recipes (watch salt content).  Making meals that cover 3-4 future meals is an easy way to stay on track.  2.  Drink a 8-12 oz glass of water every 2-3 hours when awake.  We often mistake hunger for thirst, especially when losing weight.  3. Remember your Reward and Motivation when things get hard.  4.  Weigh yourself every morning and record, you'll stay on track better and learn how our biorhythms, diet and elimination patterns show up on the scale. Don't worry about 1 or 2 day patterns, but when on track you'll notice good trend downward of weight over 3-4 day segments.  Plateaus tend to resolve after 4-8 days in most cases if you stay consistent with your plan.  These are natural and part of weight loss, even if you're perfect with your plan execution.  5. Call if problems/concerns.  [employer] is a great tool to stay in touch and provide weekly outside accountability. Check in with questions or if you want to brag.  6.  Find a handful of meals/foods that keep you on track and feeling good and get into a routine that is sustainable for you.  It's OK to have a routine that works for you.  7.  Consider taking a complete multivitamin just to make sure all micronutrients are adequate during weight loss.  8. If losing hair/brittle nails it usually means you are not taking enough protein.  Minimum goal is 60 grams daily of protein for smaller women, 80 grams a day for men. Consider taking Biotin as supplement or a \"Hair and Nail\" multivitamin.        Information about the Weight Loss Medication Phentermine    When combined with mindful eating and behavior changes, weight loss medications can be a nice " "additional tool to maximize your weight loss season.  There are no magic pills and without diet and behavior changes, weight loss will be minimal.  Think of this medication as a tool to make your diet and behavior changes easier and you'll enjoy a higher probability of success.  Remember not to skip meals, but use this medication to tolerate your reduced calories more easily.  If you are very hungry in the evenings, you are likely not eating enough in the first 10 hours of your day and need to focus on getting your protein requirements in at each of your 3 daily meals.      Phentermine is a stimulant medication related to the amphetamine class of medication but with a lower risk of dependence and addiction.  It is used for weight loss by suppressing the appetite region of the brain.  It also may speed up the metabolic rate and give a person more energy.  Like any medication there are potential side effects and the most common are:  Dry mouth occurs in almost everyone (hydrate well), fewer people experience Palpatations, fast heart rate, elevation of blood pressure, restlessness, insomnia, dizziness, change in mood, tremor, headache, changes in bowel movements,itchiness, changes in sex drive.  If you are or may have become pregnant, do not use phentermine as it increases the risk for birth defects/miscarriage.  Do not use if breastfeeding.    Some people can develop serious side effects which include:  Heart strain (\"ischemia\").  Tachycardia (fast heart rate or irregular heart rate).  Hypertension  Pulmonary Hypertension  Psychosis  Dependency and abuse has occurred in some.  If you've been on high dose (37.5mg) for long periods, phentermine should be tapered down over a few weeks before abruptly stopping as seizures have been reported rarely.    We do not recommend taking it in combination with the following medications due to potential drug interactions which can increase the risk of side effects and/or potential " for seizures:    Absolutely contraindicated are:  Amphetamines or other stimulants like ADHD medication: (dextroamphetamine, amphetamine, diethylpropion, isocarboxazid, methamphetamine, lisdexamfetamine, benzphetamine,dexmethylphenidate, methylphenidate, selegiline patch, sibutramine, tranylcypromine.    Avoid use with:   Dopamine, dobutamine, ephedra, ephedrine, epinephrine, isoproterenol, linezolid, norepinephrine, phenylephrine injection, venlafaxine (Effexor).    Monitor or modify dose with:  Acebutolol, atenolol, betaxolol, bisoprolol, carvedilol, droxidopa, esmolol, labetalol, magnesium citrate, metoprolol, nadolol, nebivolol, penbutolol, pindolol, propranolol, sotalol, timolol.    Caution with: armodafinil,betaxolol eye drops, brexpiprazole, bupropion, busulfan, caffeine, carteolol drops, enzalutaminde, ginseng, green tea, guarana, levobunolol drops, lindane cream, modafinil, afrin nasal spray (oxymetazoline), pamabrom, phenylephrine oral and nasal spray, pseudoephedrine (sudafed), rasgiline, sleegiline, bowel prep, tiagabine, timolol drops,  TRAMADOL due to increased risk of seizures.    The current cheapest place to fill your prescription is at [facility], [facility] or [place] [facility] or [facility] and is around $22for 90 tablets.  Occasionally, [employer] and [employer] have price matched, so call around and get the best price for you.  Other pharmacies may charge closer to$70- $100 for the same prescription. You don't have to be a member to use the pharmacy at [facility] currently.  An alternative some patients have tried is using a voucher system through [employer].  $25 paid on their website gets you a  voucher that can allows you to pick your meds up at [facility] without paying anything more.  [employer] may also offer discounted coupons and give prices around you.    Dosing:  We start with half a tablet for the first 2-3 weeks and if tolerating it without problems, you can take up to one full  "tablet daily in the morning after breakfast.  For those with evening hunger problems, sometimes half a tablet in the morning and half a tablet around 1 pm can be effective, however, risks of nighttime insomnia/restless increase with afternoon dosing so call me at the clinic if considering this regimen or having any issues.  You only have to use the amount effective for you, not to exceed one full tablet.  It can also be used situationaly and does not have to be taken every day. For more sensitive individuals,: get a pill cutter and cut the half tab into quarters and use a quarter of a tablet, about 9mg, for a couple weeks before increasing to half a tablet (18.75mg) if needed.  As always, if any questions give us a call at the [facility] telephone:  [phone].     Don't use Phentermine if sick/ill or using other stimulants/cold medications and it's OK to skip days that you don't feel the need for appetite suppressant assistance.  This medication works the day you take it and doesn't require \"building up\" in the system.    MEDICATIONS FOR WEIGHT LOSS  There are several medications available to assist us in weight loss.  By themselves, without compliance to a change in diet and increase in movement/activity these medications are disappointing in their results. However, combined with a closely monitored program of diet change and exercise they can be very effective in controlling appetite and boosting initial weight loss.  All weight loss medications need continual re-evaluation for efficacy as their side effects and health benefits fail to be worthwhile if a person is not continuing to lose weight or in maintaining their healthy weight.  Some weight loss medications are scheduled drugs, meaning there is at least a theoretical possibility for developing addiction to them but in practice this is rare.  We do anticipate coming off meds in the future after stabilization of weight loss is assurred. "  Finally, a tolerance can develop and people s perceived efficacy of medication can diminish.  In communication with your physician, it may be appropriate to intermittently take a break from these medications and then restart again (few weeks off then restart again) if a plateau is reached that cannot be broken through.  Each person can respond to a medication differently and to be a good option for you, it will need to be affordable, effective and well tolerated with minimal side effects.    In most cases, weight loss progress after one month and three months will be obtained and if a patient is not reaching the satisfactory progress towards weight loss, the medications may be discontinued.  The thought is that if a person is taking a weight loss medication and not receiving the potential health benefit of that drug, the side effects are not worthwhile and use should be discontinued.  On the flip side, there are many people on some weight loss medications for years because it continues to be an effective tool in their weight management and they are tolerating the medication without any long-term side effects.  Each person's response and purpose will be evaluated.      PHENTERMINE (Adipex): approved in 1959 for appetite suppression.  It has stimulant effects and cannot be used with Ritalin, Concerta, or other stimulants.  Although it is not highly addictive, it's chemically related to amphetamines which are addictive.  Occasional dependence can develop, but rarely. The most common side effects are dry mouth, increased energy and concentration, increased pulse, and constipation.  You should not take phentermine if you have glaucoma, hyperthyroidism, or uncontrolled/untreated hypertension or overly anxious. You should stop if dramatic mood swings, severe insomnia, palpations, chest pains, visual changes or if your Blood Pressure is consistently elevated or any time it's over 160/90.   It's ok to go off the med for a  few weeks and restart if efficacy is wearing off.  $24-$30 for 90 tablets at [facility]. Females are required to have reliable birth control to reduce the risk birth defects/miscarriage.      TOPIRAMATE (Topamax): Anti-seizure medication, also used to prevent migraines and sometimes for mood stabilization.  Side effects include paresthesia, glaucoma, altered concentration, attention difficulties, memory and speech problems, metabolic acidosis, depression, increase in body temperature and decrease sweating, risk of kidney stones.  Do not take Topamax while taking Depakote as this can cause high ammonia levels.  You must have reliable birth control as Topamax can cause birth defects.  If prolonged use has occurred it should be tapered off slowly to avoid withdrawal issues.  Insurance usually covers Topiramate.  At higher doses, there may be some confusion/forgetfulness associated with this so we try to limit dose to under 75mg twice daily to reduce this risk. Often covered by insurance as it's used for many reasons.  Topamax will cause carbonated beverages to taste bad. A recheck of your kidney/electrolytes may occur within a few months of starting.        Liraglutide (Victoza/Saxenda):   Part of the family of Glucagon Like Peptide Agonists, liraglutide directly suppresses appetite and is often used by diabetic patients due to decrease liver production of glucose, thereby improving glucose levels.  It also slows how quickly the stomach empties. May be hard to get covered for non diabetics and is an injectable medication delivered via a injector pen. It can be very costly without insurance coverage (over $500/month).  Small risk for pancreatitis and dose should be held if increased mid abdominal pain/burning. It is not to be used if previous Multiple Endocrine Neoplasia. In rodents, may increase risk of thyroid tumors and not indicated for anyone with hx of medullary thyroid cancer as a result.  If changes in  voice/swallowing should be discontinued. Reliable birth control required in women.

## 2021-10-01 NOTE — TELEPHONE ENCOUNTER
Prior Authorization Retail Medication Request    Medication/Dose: Liraglutide 1.8 mg dosing  ICD code (if different than what is on RX):    Previously Tried and Failed:    Rationale:      Insurance Name:  [employer]  Insurance ID:  [id number]      Pharmacy Information (if different than what is on RX)  Name:  [facility]  Phone:  [phone]     Key=[id number]    *According to pharmacist at [facility], pt needs a PA for the 1.8 mg dosing, approval is only for 1.2 mg dosing.

## 2021-10-04 ENCOUNTER — LAB (OUTPATIENT)
Dept: LAB | Facility: CLINIC | Age: 26
End: 2021-10-04
Attending: COLON & RECTAL SURGERY
Payer: COMMERCIAL

## 2021-10-04 DIAGNOSIS — Z11.59 ENCOUNTER FOR SCREENING FOR OTHER VIRAL DISEASES: ICD-10-CM

## 2021-10-04 PROCEDURE — U0005 INFEC AGEN DETEC AMPLI PROBE: HCPCS

## 2021-10-04 PROCEDURE — U0003 INFECTIOUS AGENT DETECTION BY NUCLEIC ACID (DNA OR RNA); SEVERE ACUTE RESPIRATORY SYNDROME CORONAVIRUS 2 (SARS-COV-2) (CORONAVIRUS DISEASE [COVID-19]), AMPLIFIED PROBE TECHNIQUE, MAKING USE OF HIGH THROUGHPUT TECHNOLOGIES AS DESCRIBED BY CMS-2020-01-R: HCPCS

## 2021-10-05 LAB — SARS-COV-2 RNA RESP QL NAA+PROBE: NEGATIVE

## 2021-10-05 NOTE — TELEPHONE ENCOUNTER
Central Prior Authorization Team   Phone: [phone]    PA Initiation    Medication: Liraglutide 18 mg/3 ml  Insurance Company: [employer] ([facility]) - Phone [phone] Fax [phone]  Pharmacy Filling the Rx: [id number] - [place], [place] - [address] AT [facility] [address]  Filling Pharmacy Phone: [phone]  Filling Pharmacy Fax:    Start Date: 10/5/2021

## 2021-10-07 ENCOUNTER — ANESTHESIA EVENT (OUTPATIENT)
Dept: SURGERY | Facility: HOSPITAL | Age: 26
End: 2021-10-07
Payer: COMMERCIAL

## 2021-10-07 ENCOUNTER — ANESTHESIA (OUTPATIENT)
Dept: SURGERY | Facility: HOSPITAL | Age: 26
End: 2021-10-07
Payer: COMMERCIAL

## 2021-10-07 ENCOUNTER — OFFICE VISIT (OUTPATIENT)
Dept: FAMILY MEDICINE | Facility: CLINIC | Age: 26
End: 2021-10-07
Payer: COMMERCIAL

## 2021-10-07 ENCOUNTER — HOSPITAL ENCOUNTER (OUTPATIENT)
Facility: HOSPITAL | Age: 26
Discharge: HOME | End: 2021-10-07
Attending: COLON & RECTAL SURGERY | Admitting: COLON & RECTAL SURGERY
Payer: COMMERCIAL

## 2021-10-07 VITALS
HEIGHT: 71 IN | BODY MASS INDEX: 44.1 KG/M2 | DIASTOLIC BLOOD PRESSURE: 86 MMHG | OXYGEN SATURATION: 97 % | WEIGHT: 315 LBS | HEART RATE: 75 BPM | SYSTOLIC BLOOD PRESSURE: 118 MMHG

## 2021-10-07 VITALS
OXYGEN SATURATION: 96 % | HEART RATE: 83 BPM | TEMPERATURE: 98.6 F | RESPIRATION RATE: 16 BRPM | SYSTOLIC BLOOD PRESSURE: 129 MMHG | WEIGHT: 315 LBS | DIASTOLIC BLOOD PRESSURE: 79 MMHG | BODY MASS INDEX: 45.51 KG/M2

## 2021-10-07 DIAGNOSIS — Z01.818 PRE-OP EXAM: Primary | ICD-10-CM

## 2021-10-07 DIAGNOSIS — K60.30 ANAL FISTULA: Primary | ICD-10-CM

## 2021-10-07 DIAGNOSIS — E66.01 MORBID OBESITY (H): ICD-10-CM

## 2021-10-07 DIAGNOSIS — K60.2 ANAL FISSURE: ICD-10-CM

## 2021-10-07 DIAGNOSIS — E11.9 TYPE 2 DIABETES MELLITUS WITHOUT COMPLICATION, WITHOUT LONG-TERM CURRENT USE OF INSULIN (H): ICD-10-CM

## 2021-10-07 PROBLEM — E55.9 VITAMIN D DEFICIENCY: Status: ACTIVE | Noted: 2021-10-07

## 2021-10-07 PROBLEM — K61.0 PERIANAL ABSCESS: Status: ACTIVE | Noted: 2020-10-29

## 2021-10-07 PROBLEM — E78.5 HYPERLIPIDEMIA: Status: ACTIVE | Noted: 2021-10-07

## 2021-10-07 PROBLEM — R03.0 ELEVATED BP WITHOUT DIAGNOSIS OF HYPERTENSION: Status: ACTIVE | Noted: 2021-07-07

## 2021-10-07 PROBLEM — Z86.16 HISTORY OF 2019 NOVEL CORONAVIRUS DISEASE (COVID-19): Status: ACTIVE | Noted: 2020-09-18

## 2021-10-07 LAB
GLUCOSE BLDC GLUCOMTR-MCNC: 128 MG/DL (ref 70–99)
GLUCOSE BLDC GLUCOMTR-MCNC: 152 MG/DL (ref 70–99)
HBA1C MFR BLD: 7.7 % (ref 0–5.6)
HGB BLD-MCNC: 16.7 G/DL (ref 13.3–17.7)

## 2021-10-07 PROCEDURE — 250N000011 HC RX IP 250 OP 636: Performed by: COLON & RECTAL SURGERY

## 2021-10-07 PROCEDURE — 85018 HEMOGLOBIN: CPT | Performed by: FAMILY MEDICINE

## 2021-10-07 PROCEDURE — 710N000012 HC RECOVERY PHASE 2, PER MINUTE: Performed by: COLON & RECTAL SURGERY

## 2021-10-07 PROCEDURE — 82962 GLUCOSE BLOOD TEST: CPT

## 2021-10-07 PROCEDURE — 710N000009 HC RECOVERY PHASE 1, LEVEL 1, PER MIN: Performed by: COLON & RECTAL SURGERY

## 2021-10-07 PROCEDURE — 258N000003 HC RX IP 258 OP 636: Performed by: NURSE ANESTHETIST, CERTIFIED REGISTERED

## 2021-10-07 PROCEDURE — 360N000075 HC SURGERY LEVEL 2, PER MIN: Performed by: COLON & RECTAL SURGERY

## 2021-10-07 PROCEDURE — 36416 COLLJ CAPILLARY BLOOD SPEC: CPT | Performed by: FAMILY MEDICINE

## 2021-10-07 PROCEDURE — 250N000025 HC SEVOFLURANE, PER MIN: Performed by: COLON & RECTAL SURGERY

## 2021-10-07 PROCEDURE — 250N000009 HC RX 250: Performed by: NURSE ANESTHETIST, CERTIFIED REGISTERED

## 2021-10-07 PROCEDURE — 250N000011 HC RX IP 250 OP 636: Performed by: NURSE ANESTHETIST, CERTIFIED REGISTERED

## 2021-10-07 PROCEDURE — 272N000001 HC OR GENERAL SUPPLY STERILE: Performed by: COLON & RECTAL SURGERY

## 2021-10-07 PROCEDURE — 370N000017 HC ANESTHESIA TECHNICAL FEE, PER MIN: Performed by: COLON & RECTAL SURGERY

## 2021-10-07 PROCEDURE — 99214 OFFICE O/P EST MOD 30 MIN: CPT | Performed by: FAMILY MEDICINE

## 2021-10-07 PROCEDURE — 250N000013 HC RX MED GY IP 250 OP 250 PS 637: Performed by: ANESTHESIOLOGY

## 2021-10-07 PROCEDURE — 83036 HEMOGLOBIN GLYCOSYLATED A1C: CPT | Performed by: FAMILY MEDICINE

## 2021-10-07 PROCEDURE — 999N000141 HC STATISTIC PRE-PROCEDURE NURSING ASSESSMENT: Performed by: COLON & RECTAL SURGERY

## 2021-10-07 RX ORDER — OXYCODONE HYDROCHLORIDE 5 MG/1
5 TABLET ORAL
Status: DISCONTINUED | OUTPATIENT
Start: 2021-10-07 | End: 2021-10-07 | Stop reason: HOSPADM

## 2021-10-07 RX ORDER — DEXAMETHASONE SODIUM PHOSPHATE 4 MG/ML
INJECTION, SOLUTION INTRA-ARTICULAR; INTRALESIONAL; INTRAMUSCULAR; INTRAVENOUS; SOFT TISSUE PRN
Status: DISCONTINUED | OUTPATIENT
Start: 2021-10-07 | End: 2021-10-07

## 2021-10-07 RX ORDER — HALOPERIDOL 5 MG/ML
1 INJECTION INTRAMUSCULAR
Status: DISCONTINUED | OUTPATIENT
Start: 2021-10-07 | End: 2021-10-07 | Stop reason: HOSPADM

## 2021-10-07 RX ORDER — ESMOLOL HYDROCHLORIDE 10 MG/ML
INJECTION INTRAVENOUS PRN
Status: DISCONTINUED | OUTPATIENT
Start: 2021-10-07 | End: 2021-10-07

## 2021-10-07 RX ORDER — SODIUM CHLORIDE, SODIUM LACTATE, POTASSIUM CHLORIDE, CALCIUM CHLORIDE 600; 310; 30; 20 MG/100ML; MG/100ML; MG/100ML; MG/100ML
INJECTION, SOLUTION INTRAVENOUS CONTINUOUS PRN
Status: DISCONTINUED | OUTPATIENT
Start: 2021-10-07 | End: 2021-10-07

## 2021-10-07 RX ORDER — PROPOFOL 10 MG/ML
INJECTION, EMULSION INTRAVENOUS PRN
Status: DISCONTINUED | OUTPATIENT
Start: 2021-10-07 | End: 2021-10-07

## 2021-10-07 RX ORDER — HYDROMORPHONE HCL IN WATER/PF 6 MG/30 ML
0.2 PATIENT CONTROLLED ANALGESIA SYRINGE INTRAVENOUS EVERY 5 MIN PRN
Status: DISCONTINUED | OUTPATIENT
Start: 2021-10-07 | End: 2021-10-07 | Stop reason: HOSPADM

## 2021-10-07 RX ORDER — SODIUM CHLORIDE, SODIUM LACTATE, POTASSIUM CHLORIDE, CALCIUM CHLORIDE 600; 310; 30; 20 MG/100ML; MG/100ML; MG/100ML; MG/100ML
INJECTION, SOLUTION INTRAVENOUS CONTINUOUS
Status: DISCONTINUED | OUTPATIENT
Start: 2021-10-07 | End: 2021-10-07 | Stop reason: HOSPADM

## 2021-10-07 RX ORDER — FENTANYL CITRATE 50 UG/ML
25 INJECTION, SOLUTION INTRAMUSCULAR; INTRAVENOUS EVERY 5 MIN PRN
Status: DISCONTINUED | OUTPATIENT
Start: 2021-10-07 | End: 2021-10-07 | Stop reason: HOSPADM

## 2021-10-07 RX ORDER — NALOXONE HYDROCHLORIDE 0.4 MG/ML
0.4 INJECTION, SOLUTION INTRAMUSCULAR; INTRAVENOUS; SUBCUTANEOUS
Status: DISCONTINUED | OUTPATIENT
Start: 2021-10-07 | End: 2021-10-07 | Stop reason: HOSPADM

## 2021-10-07 RX ORDER — OXYCODONE HYDROCHLORIDE 5 MG/1
5-10 TABLET ORAL
Qty: 15 TABLET | Refills: 0 | Status: SHIPPED | OUTPATIENT
Start: 2021-10-07 | End: 2021-12-02

## 2021-10-07 RX ORDER — ONDANSETRON 4 MG/1
4 TABLET, ORALLY DISINTEGRATING ORAL
Status: DISCONTINUED | OUTPATIENT
Start: 2021-10-07 | End: 2021-10-07 | Stop reason: HOSPADM

## 2021-10-07 RX ORDER — LIDOCAINE 40 MG/G
CREAM TOPICAL
Status: DISCONTINUED | OUTPATIENT
Start: 2021-10-07 | End: 2021-10-07 | Stop reason: HOSPADM

## 2021-10-07 RX ORDER — FENTANYL CITRATE 50 UG/ML
25 INJECTION, SOLUTION INTRAMUSCULAR; INTRAVENOUS
Status: DISCONTINUED | OUTPATIENT
Start: 2021-10-07 | End: 2021-10-07 | Stop reason: HOSPADM

## 2021-10-07 RX ORDER — NALOXONE HYDROCHLORIDE 0.4 MG/ML
0.2 INJECTION, SOLUTION INTRAMUSCULAR; INTRAVENOUS; SUBCUTANEOUS
Status: DISCONTINUED | OUTPATIENT
Start: 2021-10-07 | End: 2021-10-07 | Stop reason: HOSPADM

## 2021-10-07 RX ORDER — ONDANSETRON 2 MG/ML
INJECTION INTRAMUSCULAR; INTRAVENOUS PRN
Status: DISCONTINUED | OUTPATIENT
Start: 2021-10-07 | End: 2021-10-07

## 2021-10-07 RX ORDER — ACETAMINOPHEN 325 MG/1
975 TABLET ORAL ONCE
Status: COMPLETED | OUTPATIENT
Start: 2021-10-07 | End: 2021-10-07

## 2021-10-07 RX ORDER — FENTANYL CITRATE 50 UG/ML
INJECTION, SOLUTION INTRAMUSCULAR; INTRAVENOUS PRN
Status: DISCONTINUED | OUTPATIENT
Start: 2021-10-07 | End: 2021-10-07

## 2021-10-07 RX ORDER — KETAMINE HYDROCHLORIDE 50 MG/ML
INJECTION, SOLUTION INTRAMUSCULAR; INTRAVENOUS PRN
Status: DISCONTINUED | OUTPATIENT
Start: 2021-10-07 | End: 2021-10-07

## 2021-10-07 RX ORDER — ONDANSETRON 2 MG/ML
4 INJECTION INTRAMUSCULAR; INTRAVENOUS EVERY 30 MIN PRN
Status: DISCONTINUED | OUTPATIENT
Start: 2021-10-07 | End: 2021-10-07 | Stop reason: HOSPADM

## 2021-10-07 RX ORDER — MEPERIDINE HYDROCHLORIDE 25 MG/ML
12.5 INJECTION INTRAMUSCULAR; INTRAVENOUS; SUBCUTANEOUS
Status: DISCONTINUED | OUTPATIENT
Start: 2021-10-07 | End: 2021-10-07 | Stop reason: HOSPADM

## 2021-10-07 RX ORDER — OXYCODONE HYDROCHLORIDE 5 MG/1
5 TABLET ORAL EVERY 4 HOURS PRN
Status: DISCONTINUED | OUTPATIENT
Start: 2021-10-07 | End: 2021-10-07 | Stop reason: HOSPADM

## 2021-10-07 RX ORDER — LIDOCAINE HYDROCHLORIDE 20 MG/ML
INJECTION, SOLUTION INFILTRATION; PERINEURAL PRN
Status: DISCONTINUED | OUTPATIENT
Start: 2021-10-07 | End: 2021-10-07

## 2021-10-07 RX ORDER — ONDANSETRON 4 MG/1
4 TABLET, ORALLY DISINTEGRATING ORAL EVERY 30 MIN PRN
Status: DISCONTINUED | OUTPATIENT
Start: 2021-10-07 | End: 2021-10-07 | Stop reason: HOSPADM

## 2021-10-07 RX ORDER — BUPIVACAINE HYDROCHLORIDE 2.5 MG/ML
INJECTION, SOLUTION EPIDURAL; INFILTRATION; INTRACAUDAL PRN
Status: DISCONTINUED | OUTPATIENT
Start: 2021-10-07 | End: 2021-10-07 | Stop reason: HOSPADM

## 2021-10-07 RX ORDER — SILVER SULFADIAZINE 10 MG/G
CREAM TOPICAL ONCE
Status: DISCONTINUED | OUTPATIENT
Start: 2021-10-07 | End: 2021-10-07 | Stop reason: HOSPADM

## 2021-10-07 RX ADMIN — ESMOLOL HYDROCHLORIDE 30 MG: 100 INJECTION, SOLUTION INTRAVENOUS at 14:45

## 2021-10-07 RX ADMIN — LIDOCAINE HYDROCHLORIDE 40 MG: 20 INJECTION, SOLUTION INFILTRATION; PERINEURAL at 14:12

## 2021-10-07 RX ADMIN — KETAMINE HYDROCHLORIDE 50 MG: 50 INJECTION, SOLUTION INTRAMUSCULAR; INTRAVENOUS at 14:12

## 2021-10-07 RX ADMIN — ACETAMINOPHEN 975 MG: 325 TABLET ORAL at 16:42

## 2021-10-07 RX ADMIN — ONDANSETRON 4 MG: 2 INJECTION INTRAMUSCULAR; INTRAVENOUS at 14:30

## 2021-10-07 RX ADMIN — SODIUM CHLORIDE, POTASSIUM CHLORIDE, SODIUM LACTATE AND CALCIUM CHLORIDE: 600; 310; 30; 20 INJECTION, SOLUTION INTRAVENOUS at 14:05

## 2021-10-07 RX ADMIN — HYDROMORPHONE HYDROCHLORIDE 0.5 MG: 1 INJECTION, SOLUTION INTRAMUSCULAR; INTRAVENOUS; SUBCUTANEOUS at 14:10

## 2021-10-07 RX ADMIN — DEXAMETHASONE SODIUM PHOSPHATE 4 MG: 4 INJECTION, SOLUTION INTRA-ARTICULAR; INTRALESIONAL; INTRAMUSCULAR; INTRAVENOUS; SOFT TISSUE at 14:12

## 2021-10-07 RX ADMIN — ROCURONIUM BROMIDE 50 MG: 50 INJECTION, SOLUTION INTRAVENOUS at 14:12

## 2021-10-07 RX ADMIN — FENTANYL CITRATE 100 MCG: 50 INJECTION, SOLUTION INTRAMUSCULAR; INTRAVENOUS at 14:12

## 2021-10-07 RX ADMIN — SUGAMMADEX 200 MG: 100 INJECTION, SOLUTION INTRAVENOUS at 14:40

## 2021-10-07 RX ADMIN — MIDAZOLAM HYDROCHLORIDE 2 MG: 1 INJECTION, SOLUTION INTRAMUSCULAR; INTRAVENOUS at 14:05

## 2021-10-07 RX ADMIN — PROPOFOL 300 MG: 10 INJECTION, EMULSION INTRAVENOUS at 14:12

## 2021-10-07 RX ADMIN — ESMOLOL HYDROCHLORIDE 30 MG: 100 INJECTION, SOLUTION INTRAVENOUS at 14:48

## 2021-10-07 RX ADMIN — PHENYLEPHRINE HYDROCHLORIDE 100 MCG: 10 INJECTION INTRAVENOUS at 14:12

## 2021-10-07 ASSESSMENT — MIFFLIN-ST. JEOR: SCORE: 2494

## 2021-10-07 NOTE — H&P (VIEW-ONLY)
[facility]  [address] [place] [id number]  Phone: [phone]  Fax: [phone]  Primary Provider: Kelly Watt  Pre-op Performing Provider: LANI VEGA      PREOPERATIVE EVALUATION:  Today's date: 10/7/2021    Saravanan Park is a 26 year old male who presents for a preoperative evaluation.    Surgical Information:  Surgery/Procedure: POSSIBLE FISTULOTOMY (N/A Anus)   AND SETON PLACEMENT   Surgery Location: [facility]   Surgeon: Dr. Antione Kwok  Surgery Date: 10/7/21  Time of Surgery: 2:15 pm   Where patient plans to recover: At home with family  Fax number for surgical facility: Note does not need to be faxed, will be available electronically in Epic.    Type of Anesthesia Anticipated: General    Assessment & Plan     The proposed surgical procedure is considered LOW risk.    Pre-op exam  Anal fissure  Medically cleared for EUA and seton replacement later today.  COVID swab done on Monday, negative.  - Hemoglobin    Morbid obesity (H)  Congratulated on recent 10lb weight loss; on phentermine. Hold today    Type 2 diabetes mellitus without complication, without long-term current use of insulin (H)  A1c last 3months ago, check today but had been well controlled at 7.2%  - Hemoglobin A1c  - hold metformin today         Risks and Recommendations:  The patient has the following additional risks and recommendations for perioperative complications:   - No identified additional risk factors other than previously addressed      RECOMMENDATION:  APPROVAL GIVEN to proceed with proposed procedure, without further diagnostic evaluation.      Subjective      Pt unintentionally slept past his appt time at 0840 this AM and arrived to clinic around 10am for his same day pre op.      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 Dr. Kwok is going to do an EUA to do possible fistulotomy and seton replacement. Last  done in Jan 2021 and this was uncomplicated.     He has a hx of Diabetes; currently on metformin and Victoza. Last A1c in July was 7.4%. Previously he was on insulin but not anymore.     Preop Questions 10/7/2021   1. Have you ever had a heart attack or stroke? No   2. Have you ever had surgery on your heart or blood vessels, such as a stent placement, a coronary artery bypass, or surgery on an artery in your head, neck, heart, or legs? No   3. Do you have chest pain with activity? No   4. Do you have a history of  heart failure? No   5. Do you currently have a cold, bronchitis or symptoms of other infection? No   6. Do you have a cough, shortness of breath, or wheezing? No   7. Do you or anyone in your family have previous history of blood clots? No   8. Do you or does anyone in your family have a serious bleeding problem such as prolonged bleeding following surgeries or cuts? No   9. Have you ever had problems with anemia or been told to take iron pills? No   10. Have you had any abnormal blood loss such as black, tarry or bloody stools? No   11. Have you ever had a blood transfusion? No   12. Are you willing to have a blood transfusion if it is medically needed before, during, or after your surgery? Yes   13. Have you or any of your relatives ever had problems with anesthesia? No   14. Do you have sleep apnea, excessive snoring or daytime drowsiness? No- was advised a sleep eval at his last pre op appt and hasn't gone yet   15. Do you have any artifical heart valves or other implanted medical devices like a pacemaker, defibrillator, or continuous glucose monitor? No   16. Do you have artificial joints? No   17. Are you allergic to latex? No       Health Care Directive:  Patient does not have a Health Care Directive or Living Will: Discussed advance care planning with patient; however, patient declined at this time.    Preoperative Review of : phentermine per Bariatric clinic, Dr. Kwok Rx for oxycodone #40  tabs in Jan after last seton replacement     reviewed - controlled substances reflected in medication list.      Review of Systems  CONSTITUTIONAL: NEGATIVE for fever, chills, change in weight  ENT/MOUTH: NEGATIVE for ear, mouth and throat problems  RESP: NEGATIVE for significant cough or SOB  CV: NEGATIVE for chest pain, palpitations or peripheral edema   Otherwise 12 point ROS negative    Patient Active Problem List    Diagnosis Date Noted     Vitamin D deficiency 10/07/2021     Priority: Medium     Formatting of this note might be different from the original.  Created by Conversion    Replacement Utility updated for latest IMO load       Hyperlipidemia 10/07/2021     Priority: Medium     Formatting of this note might be different from the original.  Created by Conversion       Elevated BP without diagnosis of hypertension 07/07/2021     Priority: Medium     Type 2 diabetes mellitus without complication, without long-term current use of insulin (H) 12/16/2020     Priority: Medium     Created by [facility] Annotation: Mar  3 2010 12:45PM - Paul Cid: per   mother's   report at 3/3/10 appt. Being seen at [facility] peds endo         Perianal abscess 10/29/2020     Priority: Medium     Formatting of this note might be different from the original.  Added automatically from request for surgery [id number]       History of 2019 novel coronavirus disease (COVID-19) 09/18/2020     Priority: Medium     Formatting of this note might be different from the original.  Require hospitalization, no intubation.       Morbid obesity (H) 06/21/2018     Priority: Medium      Past Medical History:   Diagnosis Date     Diabetes (H)      History of 2019 novel coronavirus disease (COVID-19) 9/18/2020    Require hospitalization, no intubation.     Hyperlipidemia      Morbid obesity (H) 8/12/2020     Obesity      Pneumonia     August 2020     Vitamin D deficiency     Created by Conversion  Replacement Utility updated for  "latest IMO load     Past Surgical History:   Procedure Laterality Date     HC KNEE SCOPE, DIAGNOSTIC      Description: Arthroscopy Knee Right;  Proc Date: 09/23/2010;  Comments: football injury on 9/7/10     OTHER SURGICAL HISTORY  2015    right hand surgery     ND PLACEMENT,SETON N/A 1/27/2021    Procedure: EXAM UNDER ANESTHESIA WITH PARTIAL  FISUTLOTOMY, REVISION SETON, AND PLACEMENT OF SETON, EXCISION THIGH SKIN TAGS;  Surgeon: Antione Kwok MD;  Location: [facility];  Service: General     Current Outpatient Medications   Medication Sig Dispense Refill     cholecalciferol 50 MCG (2000 UT) CAPS One capsule daily to maintain vitamin D levels.       liraglutide (VICTOZA) 18 MG/3ML solution Inject 1.8 mg Subcutaneous daily 27 mL 3     metFORMIN (GLUCOPHAGE-XR) 500 MG 24 hr tablet Take 1 tablet by mouth twice daily for 7 days, then increase to 2 tablets by mouth twice daily       phentermine (ADIPEX-P) 37.5 MG tablet Start half tablet daily after breakfast. 30 tablet 0     thiamine (B-1) 100 MG tablet Take once daily for 30 days then decrease to once weekly thereafter until finished.         No Known Allergies     Social History     Tobacco Use     Smoking status: Passive Smoke Exposure - Never Smoker     Smokeless tobacco: Never Used     Tobacco comment: family smokes outside home   Substance Use Topics     Alcohol use: Yes     Comment: Alcoholic Drinks/day: weekends, 10 beers on average        History   Drug Use Unknown         Objective     /86 (BP Location: Left arm, Patient Position: Sitting, Cuff Size: Thigh)   Pulse 75   Ht 1.803 m (5' 11\")   Wt 149.7 kg (330 lb)   SpO2 97%   BMI 46.03 kg/m      Physical Exam    GENERAL APPEARANCE: healthy, alert and no distress     EYES: EOMI,  PERRL     HENT: ear canals and TM's normal and nose and mouth without ulcers or lesions     NECK: no adenopathy, no asymmetry, masses, or scars and thyroid normal to palpation     RESP: lungs clear to auscultation - " no rales, rhonchi or wheezes     CV: regular rates and rhythm, normal S1 S2, no S3 or S4 and no murmur, click or rub     ABDOMEN:  Obese, soft, nontender, no HSM or masses and bowel sounds normal     MS: extremities normal- no gross deformities noted, no evidence of inflammation in joints, FROM in all extremities.     SKIN: no suspicious lesions or rashes     NEURO: Normal strength and tone, sensory exam grossly normal, mentation intact and speech normal     PSYCH: mentation appears normal. and affect normal/bright     LYMPHATICS: No cervical adenopathy    Recent Labs   Lab Test 07/07/21  1559 03/17/21  1513 01/22/21  0852 12/16/20  1658 09/18/20  1650 08/07/20  1147 08/07/20  1147 08/06/20  1558 08/06/20  0535   HGB  --   --  15.6  --  15.3   < > 14.9   < > 14.4   PLT  --   --  178  --  209   < > 174   < > 118*   INR  --   --   --   --   --   --  0.97  --  0.99   NA  --  137 138  --  141   < > 133*   < > 135*   POTASSIUM  --  4.0 4.3  --  4.2   < > 3.9   < > 3.1*   CR  --  0.83 0.95  --  0.81   < > 0.83   < > 0.81   A1C 7.2* 8.3*  --    < > 9.3*  --   --   --   --     < > = values in this interval not displayed.        Diagnostics:  Labs pending at this time.  Results will be reviewed when available.   No EKG required for low risk surgery (cataract, skin procedure, breast biopsy, etc).    Revised Cardiac Risk Index (RCRI):  The patient has the following serious cardiovascular risks for perioperative complications:   - No serious cardiac risks = 0 points     RCRI Interpretation: 0 points: Class I (very low risk - 0.4% complication rate)           Signed Electronically by: Ernestine Garcia MD  Copy of this evaluation report is provided to requesting physician.

## 2021-10-07 NOTE — BRIEF OP NOTE
[facility]    Brief Operative Note    Pre-operative diagnosis: Anal fistula [K60.3]  Post-operative diagnosis Transphincteric fistula X 2    Procedure: Procedure(s):  EXAM UNDER ANESTHESIA  PARTIAL FISTULOTOMY  AND SETON CHANGE  Surgeon: Surgeon(s) and Role:     * Antione Kwok MD - Primary  Anesthesia: General   Estimated Blood Loss: Less than 10 ml    Drains: None  Specimens: * No specimens in log *  Findings:   Left lateral fistula appears transphincteric rather than superficial-only partial fistulotomy done. No opening at base of scrotum  Complications: None.  Implants: * No implants in log *

## 2021-10-07 NOTE — PROGRESS NOTES
[facility]  [address] [place] [id number]  Phone: [phone]  Fax: [phone]  Primary Provider: Kelly Watt  Pre-op Performing Provider: LANI VEGA      PREOPERATIVE EVALUATION:  Today's date: 10/7/2021    Saravanan Park is a 26 year old male who presents for a preoperative evaluation.    Surgical Information:  Surgery/Procedure: POSSIBLE FISTULOTOMY (N/A Anus)   AND SETON PLACEMENT   Surgery Location: [facility]   Surgeon: Dr. Antione Kwok  Surgery Date: 10/7/21  Time of Surgery: 2:15 pm   Where patient plans to recover: At home with family  Fax number for surgical facility: Note does not need to be faxed, will be available electronically in Epic.    Type of Anesthesia Anticipated: General    Assessment & Plan     The proposed surgical procedure is considered LOW risk.    Pre-op exam  Anal fissure  Medically cleared for EUA and seton replacement later today.  COVID swab done on Monday, negative.  - Hemoglobin    Morbid obesity (H)  Congratulated on recent 10lb weight loss; on phentermine. Hold today    Type 2 diabetes mellitus without complication, without long-term current use of insulin (H)  A1c last 3months ago, check today but had been well controlled at 7.2%  - Hemoglobin A1c  - hold metformin today         Risks and Recommendations:  The patient has the following additional risks and recommendations for perioperative complications:   - No identified additional risk factors other than previously addressed      RECOMMENDATION:  APPROVAL GIVEN to proceed with proposed procedure, without further diagnostic evaluation.      Subjective      Pt unintentionally slept past his appt time at 0840 this AM and arrived to clinic around 10am for his same day pre op.      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 Dr. Kwok is going to do an EUA to do possible fistulotomy and seton replacement. Last  done in Jan 2021 and this was uncomplicated.     He has a hx of Diabetes; currently on metformin and Victoza. Last A1c in July was 7.4%. Previously he was on insulin but not anymore.     Preop Questions 10/7/2021   1. Have you ever had a heart attack or stroke? No   2. Have you ever had surgery on your heart or blood vessels, such as a stent placement, a coronary artery bypass, or surgery on an artery in your head, neck, heart, or legs? No   3. Do you have chest pain with activity? No   4. Do you have a history of  heart failure? No   5. Do you currently have a cold, bronchitis or symptoms of other infection? No   6. Do you have a cough, shortness of breath, or wheezing? No   7. Do you or anyone in your family have previous history of blood clots? No   8. Do you or does anyone in your family have a serious bleeding problem such as prolonged bleeding following surgeries or cuts? No   9. Have you ever had problems with anemia or been told to take iron pills? No   10. Have you had any abnormal blood loss such as black, tarry or bloody stools? No   11. Have you ever had a blood transfusion? No   12. Are you willing to have a blood transfusion if it is medically needed before, during, or after your surgery? Yes   13. Have you or any of your relatives ever had problems with anesthesia? No   14. Do you have sleep apnea, excessive snoring or daytime drowsiness? No- was advised a sleep eval at his last pre op appt and hasn't gone yet   15. Do you have any artifical heart valves or other implanted medical devices like a pacemaker, defibrillator, or continuous glucose monitor? No   16. Do you have artificial joints? No   17. Are you allergic to latex? No       Health Care Directive:  Patient does not have a Health Care Directive or Living Will: Discussed advance care planning with patient; however, patient declined at this time.    Preoperative Review of : phentermine per Bariatric clinic, Dr. Kwok Rx for oxycodone #40  tabs in Jan after last seton replacement     reviewed - controlled substances reflected in medication list.      Review of Systems  CONSTITUTIONAL: NEGATIVE for fever, chills, change in weight  ENT/MOUTH: NEGATIVE for ear, mouth and throat problems  RESP: NEGATIVE for significant cough or SOB  CV: NEGATIVE for chest pain, palpitations or peripheral edema   Otherwise 12 point ROS negative    Patient Active Problem List    Diagnosis Date Noted     Vitamin D deficiency 10/07/2021     Priority: Medium     Formatting of this note might be different from the original.  Created by Conversion    Replacement Utility updated for latest IMO load       Hyperlipidemia 10/07/2021     Priority: Medium     Formatting of this note might be different from the original.  Created by Conversion       Elevated BP without diagnosis of hypertension 07/07/2021     Priority: Medium     Type 2 diabetes mellitus without complication, without long-term current use of insulin (H) 12/16/2020     Priority: Medium     Created by [employer] [facility] Annotation: Mar  3 2010 12:45PM - Paul Cid: per   mother's   report at 3/3/10 appt. Being seen at [facility] peds endo         Perianal abscess 10/29/2020     Priority: Medium     Formatting of this note might be different from the original.  Added automatically from request for surgery [id number]       History of 2019 novel coronavirus disease (COVID-19) 09/18/2020     Priority: Medium     Formatting of this note might be different from the original.  Require hospitalization, no intubation.       Morbid obesity (H) 06/21/2018     Priority: Medium      Past Medical History:   Diagnosis Date     Diabetes (H)      History of 2019 novel coronavirus disease (COVID-19) 9/18/2020    Require hospitalization, no intubation.     Hyperlipidemia      Morbid obesity (H) 8/12/2020     Obesity      Pneumonia     August 2020     Vitamin D deficiency     Created by Conversion  Replacement Utility updated for  "latest IMO load     Past Surgical History:   Procedure Laterality Date     HC KNEE SCOPE, DIAGNOSTIC      Description: Arthroscopy Knee Right;  Proc Date: 09/23/2010;  Comments: football injury on 9/7/10     OTHER SURGICAL HISTORY  2015    right hand surgery     NE PLACEMENT,SETON N/A 1/27/2021    Procedure: EXAM UNDER ANESTHESIA WITH PARTIAL  FISUTLOTOMY, REVISION SETON, AND PLACEMENT OF SETON, EXCISION THIGH SKIN TAGS;  Surgeon: Antione Kwok MD;  Location: [facility];  Service: General     Current Outpatient Medications   Medication Sig Dispense Refill     cholecalciferol 50 MCG (2000 UT) CAPS One capsule daily to maintain vitamin D levels.       liraglutide (VICTOZA) 18 MG/3ML solution Inject 1.8 mg Subcutaneous daily 27 mL 3     metFORMIN (GLUCOPHAGE-XR) 500 MG 24 hr tablet Take 1 tablet by mouth twice daily for 7 days, then increase to 2 tablets by mouth twice daily       phentermine (ADIPEX-P) 37.5 MG tablet Start half tablet daily after breakfast. 30 tablet 0     thiamine (B-1) 100 MG tablet Take once daily for 30 days then decrease to once weekly thereafter until finished.         No Known Allergies     Social History     Tobacco Use     Smoking status: Passive Smoke Exposure - Never Smoker     Smokeless tobacco: Never Used     Tobacco comment: family smokes outside home   Substance Use Topics     Alcohol use: Yes     Comment: Alcoholic Drinks/day: weekends, 10 beers on average        History   Drug Use Unknown         Objective     /86 (BP Location: Left arm, Patient Position: Sitting, Cuff Size: Thigh)   Pulse 75   Ht 1.803 m (5' 11\")   Wt 149.7 kg (330 lb)   SpO2 97%   BMI 46.03 kg/m      Physical Exam    GENERAL APPEARANCE: healthy, alert and no distress     EYES: EOMI,  PERRL     HENT: ear canals and TM's normal and nose and mouth without ulcers or lesions     NECK: no adenopathy, no asymmetry, masses, or scars and thyroid normal to palpation     RESP: lungs clear to auscultation - " no rales, rhonchi or wheezes     CV: regular rates and rhythm, normal S1 S2, no S3 or S4 and no murmur, click or rub     ABDOMEN:  Obese, soft, nontender, no HSM or masses and bowel sounds normal     MS: extremities normal- no gross deformities noted, no evidence of inflammation in joints, FROM in all extremities.     SKIN: no suspicious lesions or rashes     NEURO: Normal strength and tone, sensory exam grossly normal, mentation intact and speech normal     PSYCH: mentation appears normal. and affect normal/bright     LYMPHATICS: No cervical adenopathy    Recent Labs   Lab Test 07/07/21  1559 03/17/21  1513 01/22/21  0852 12/16/20  1658 09/18/20  1650 08/07/20  1147 08/07/20  1147 08/06/20  1558 08/06/20  0535   HGB  --   --  15.6  --  15.3   < > 14.9   < > 14.4   PLT  --   --  178  --  209   < > 174   < > 118*   INR  --   --   --   --   --   --  0.97  --  0.99   NA  --  137 138  --  141   < > 133*   < > 135*   POTASSIUM  --  4.0 4.3  --  4.2   < > 3.9   < > 3.1*   CR  --  0.83 0.95  --  0.81   < > 0.83   < > 0.81   A1C 7.2* 8.3*  --    < > 9.3*  --   --   --   --     < > = values in this interval not displayed.        Diagnostics:  Labs pending at this time.  Results will be reviewed when available.   No EKG required for low risk surgery (cataract, skin procedure, breast biopsy, etc).    Revised Cardiac Risk Index (RCRI):  The patient has the following serious cardiovascular risks for perioperative complications:   - No serious cardiac risks = 0 points     RCRI Interpretation: 0 points: Class I (very low risk - 0.4% complication rate)           Signed Electronically by: Ernestine Garcia MD  Copy of this evaluation report is provided to requesting physician.

## 2021-10-07 NOTE — ANESTHESIA POSTPROCEDURE EVALUATION
Patient: Saravanan Park    Procedure: Procedure(s):  EXAM UNDER ANESTHESIA  PARTIAL FISTULOTOMY  AND SETON CHANGE       Diagnosis:Anal fistula [K60.3]  Diagnosis Additional Information: No value filed.    Anesthesia Type:  General    Note:  Disposition: Outpatient   Postop Pain Control: Uneventful            Sign Out: Well controlled pain   PONV: No   Neuro/Psych: Uneventful            Sign Out: Acceptable/Baseline neuro status   Airway/Respiratory: Uneventful            Sign Out: Acceptable/Baseline resp. status   CV/Hemodynamics: Uneventful            Sign Out: Acceptable CV status; No obvious hypovolemia; No obvious fluid overload   Other NRE: NONE   DID A NON-ROUTINE EVENT OCCUR?            Last vitals:  Vitals Value Taken Time   /83 10/07/21 1501   Temp 36.5  C (97.7  F) 10/07/21 1500   Pulse 104 10/07/21 1509   Resp 17 10/07/21 1509   SpO2 95 % 10/07/21 1509   Vitals shown include unvalidated device data.    Electronically Signed By: Mervin Galarza MD  October 7, 2021  3:09 PM

## 2021-10-07 NOTE — ANESTHESIA CARE TRANSFER NOTE
Patient: Saravanan Park    Procedure: Procedure(s):  EXAM UNDER ANESTHESIA  PARTIAL FISTULOTOMY  AND SETON CHANGE       Diagnosis: Anal fistula [K60.3]  Diagnosis Additional Information: No value filed.    Anesthesia Type:   General     Note:    Oropharynx: oropharynx clear of all foreign objects  Level of Consciousness: awake  Oxygen Supplementation: face mask  Level of Supplemental Oxygen (L/min / FiO2): 6  Independent Airway: airway patency satisfactory and stable  Dentition: dentition unchanged  Vital Signs Stable: post-procedure vital signs reviewed and stable  Report to RN Given: handoff report given  Patient transferred to: PACU    Handoff Report: Identifed the Patient, Identified the Reponsible Provider, Reviewed the pertinent medical history, Discussed the surgical course, Reviewed Intra-OP anesthesia mangement and issues during anesthesia, Set expectations for post-procedure period and Allowed opportunity for questions and acknowledgement of understanding      Vitals:  Vitals Value Taken Time   /102 10/07/21 1458   Temp     Pulse 101 10/07/21 1458   Resp 30 10/07/21 1458   SpO2 100 % 10/07/21 1458   Vitals shown include unvalidated device data.    Electronically Signed By: ESVIN Rivas CRNA  October 7, 2021  2:58 PM

## 2021-10-07 NOTE — ANESTHESIA PREPROCEDURE EVALUATION
Anesthesia Pre-Procedure Evaluation    Patient: Saravanan Park   MRN: [id number] : 1995        Preoperative Diagnosis: Anal fistula [K60.3]    Procedure : Procedure(s):  EXAM UNDER ANESTHESIA  POSSIBLE FISTULOTOMY  AND SETON PLACEMENT          Past Medical History:   Diagnosis Date     Diabetes (H)      History of 2019 novel coronavirus disease (COVID-19) 2020    Require hospitalization, no intubation.     Hyperlipidemia      Morbid obesity (H) 2020     Obesity      Pneumonia     2020     Vitamin D deficiency     Created by Conversion  Replacement Utility updated for latest IMO load      Past Surgical History:   Procedure Laterality Date     HC KNEE SCOPE, DIAGNOSTIC      Description: Arthroscopy Knee Right;  Proc Date: 2010;  Comments: football injury on 9/7/10     OTHER SURGICAL HISTORY      right hand surgery     VA PLACEMENT,SETON N/A 2021    Procedure: EXAM UNDER ANESTHESIA WITH PARTIAL  FISUTLOTOMY, REVISION SETON, AND PLACEMENT OF SETON, EXCISION THIGH SKIN TAGS;  Surgeon: Antione Kwok MD;  [place] [facility] [place]  Service: General      No Known Allergies   Social History     Tobacco Use     Smoking status: Passive Smoke Exposure - Never Smoker     Smokeless tobacco: Never Used     Tobacco comment: family smokes outside home   Substance Use Topics     Alcohol use: Yes     Comment: Alcoholic Drinks/day: weekends, 10 beers on average       Wt Readings from Last 1 Encounters:   10/07/21 148 kg (326 lb 4.8 oz)        Anesthesia Evaluation   Pt has had prior anesthetic. Type: General.    No history of anesthetic complications       ROS/MED HX  ENT/Pulmonary:  - neg pulmonary ROS     Neurologic:  - neg neurologic ROS     Cardiovascular:     (+) hypertension-----    METS/Exercise Tolerance:     Hematologic:  - neg hematologic  ROS     Musculoskeletal:  - neg musculoskeletal ROS     GI/Hepatic:  - neg GI/hepatic ROS     Renal/Genitourinary:  - neg Renal ROS     Endo:      (+) type II DM, Obesity,     Psychiatric/Substance Use:  - neg psychiatric ROS     Infectious Disease:  - neg infectious disease ROS     Malignancy:  - neg malignancy ROS     Other:  - neg other ROS          Physical Exam    Airway  airway exam normal      Mallampati: I   TM distance: > 3 FB   Neck ROM: full   Mouth opening: > 3 cm    Respiratory Devices and Support         Dental  no notable dental history         Cardiovascular   cardiovascular exam normal       Rhythm and rate: regular and normal     Pulmonary   pulmonary exam normal        breath sounds clear to auscultation           OUTSIDE LABS:  CBC:   Lab Results   Component Value Date    WBC 6.9 01/22/2021    WBC 4.9 09/18/2020    HGB 16.7 10/07/2021    HGB 15.6 01/22/2021    HCT 47.0 01/22/2021    HCT 46.1 09/18/2020     01/22/2021     09/18/2020     BMP:   Lab Results   Component Value Date     03/17/2021     01/22/2021    POTASSIUM 4.0 03/17/2021    POTASSIUM 4.3 01/22/2021    CHLORIDE 102 03/17/2021    CHLORIDE 105 01/22/2021    CO2 22 03/17/2021    CO2 18 (L) 01/22/2021    BUN 9 03/17/2021    BUN 9 01/22/2021    CR 0.83 03/17/2021    CR 0.95 01/22/2021     (H) 10/07/2021     (H) 03/17/2021     COAGS:   Lab Results   Component Value Date    PTT 32 08/04/2020    INR 0.97 08/07/2020    FIBR 614 (H) 08/07/2020     POC: No results found for: BGM, HCG, HCGS  HEPATIC:   Lab Results   Component Value Date    ALBUMIN 4.1 03/17/2021    PROTTOTAL 7.5 03/17/2021    ALT 33 03/17/2021    AST 27 03/17/2021    ALKPHOS 43 (L) 03/17/2021    BILITOTAL 1.2 (H) 03/17/2021     OTHER:   Lab Results   Component Value Date    LACT 1.8 08/05/2020    A1C 7.7 (H) 10/07/2021    RODY 9.3 03/17/2021    MAG 1.8 03/17/2021    TSH 2.20 03/17/2021    T4 0.90 08/24/2009    CRP 8.2 (H) 08/07/2020    SED 15 06/19/2018       Anesthesia Plan    ASA Status:  4   NPO Status:  NPO Appropriate    Anesthesia Type: General.     - Airway: ETT   Induction:  Intravenous, Propofol.   Maintenance: Inhalation.        Consents    Anesthesia Plan(s) and associated risks, benefits, and realistic alternatives discussed. Questions answered and patient/representative(s) expressed understanding.     - Discussed with:  Patient      - Extended Intubation/Ventilatory Support Discussed: No.      - Patient is DNR/DNI Status: No    Use of blood products discussed: No .     Postoperative Care    Pain management: Oral pain medications.   PONV prophylaxis: Ondansetron (or other 5HT-3), Dexamethasone or Solumedrol     Comments:                Gil Tinoco MD

## 2021-10-07 NOTE — TELEPHONE ENCOUNTER
Prior Authorization Approval    Authorization Effective Date: 10/5/2021  Authorization Expiration Date: 10/5/2022  Medication: Liraglutide 18 mg/3 ml  Approved Dose/Quantity:   Reference #:     Insurance Company: [employer] ([facility]) - Phone [phone] Fax [phone]  Expected CoPay:       CoPay Card Available:      Foundation Assistance Needed:    Which Pharmacy is filling the prescription (Not needed for infusion/clinic administered): [id number] - [place], [place] - [address] AT [facility] [address]  Pharmacy Notified: Yes  Patient Notified: Yes

## 2021-10-08 NOTE — OP NOTE
Procedure Date: 10/07/2021    PREOPERATIVE DIAGNOSIS:  Anal fistulas.    POSTOPERATIVE DIAGNOSIS:  Two transsphincteric anal fistulas.    OPERATION:  EUA, partial fistulotomy and Seton exchange.    SURGEON:  Antione Kwok MD    ASSISTANT:  [facility] colorectal resident.    ANESTHESIA:  General.    INDICATIONS FOR PROCEDURE:  The patient is a gentleman who has two anal fistulas controlled by Seton.  The ultrasound suggested that one was a superficial fistula that could be treated with fistulotomy and the other was a transsphincteric fistula.  There is also question of another draining site at the base of his scrotum.  He presents for EUA, possible fistulotomy, possible Seton placement.  Risks, benefits, expected outcome discussed including the fact that we would not divide the sphincter muscle intentionally because of the risk of change in continence and he now agrees with this and is eager to proceed.    DESCRIPTION OF PROCEDURE:  After the uneventful induction of general endotracheal anesthesia, the patient was placed comfortably in jackknife prone position.  Care was taken to cushion all points of undue compression.  Buttocks was tract to adhesive tape.  Perineum prepped and draped in the usual sterile fashion.    A 20 mL of 0.25% Marcaine with epinephrine was instilled in the perineal tissues for postop pain relief.  We then performed a careful exam.  The left lateral fistula controlled by Seton seemed to be subcutaneous at its lateral aspect and I unroofed this until I came to the edge of the sphincter mechanism.  At this point, the fistula seemed to go deep to the sphincter muscle.  The internal orifice did appear to be proximal to the intersphincteric groove going through the internal sphincter, making this a transsphincteric fistula rather than a superficial fistula, which would be amenable.  This would not be amenable to fistulotomy as it would require dividing significant external and internal  sphincter.  The right anterolateral quadrant Seton also goes through a transsphincteric fistula at the base of the scrotum, was normal and completely healed.  No evidence of drainage was there.  We changed the Seton on the left side by shortening it and curetting out through the residual fistula tract.  Sterile dressing was applied.  The plan will be to let these tracts mature and I will get an MRI of his pelvis to make sure we understand the anatomy of these fistula tracts better.  He was returned to the recovery room in good condition.    Antione Kwok MD        D: 10/07/2021   T: 10/07/2021   MT: [email or web address]    Name:     CLAUDIA ORELLANA  MRN:      [id number]        Account:        [id number]   :      1995           Procedure Date: 10/07/2021     Document: [id number]

## 2021-11-08 ENCOUNTER — MYC MEDICAL ADVICE (OUTPATIENT)
Dept: FAMILY MEDICINE | Facility: CLINIC | Age: 26
End: 2021-11-08
Payer: COMMERCIAL

## 2021-11-08 NOTE — TELEPHONE ENCOUNTER
RN advised to seek walk-in care if necessary or contact [facility] -patient was seen for this issue thiere in 2019 if appointment before 11/17/21 is necessary.   Bhargavi Mejia RN on 11/8/2021 at 3:54 PM

## 2021-11-08 NOTE — TELEPHONE ENCOUNTER
Reviewed my notes- I don't think I've ever seen patient or evaluated for this foot issue. I do think if it is severe enough that he has been off work, he should be evaluated. I do have upcoming appointment in a few weeks already with patient. I can write a letter for work for the two days listed, but if further work is needed off, then he will need to be seen (unfortunately, I cannot add him earlier than the scheduled appointment, but maybe he could be see at walk-in clinic).    I sent letter via [employer] to patient for excuse from work 11/5 and 11/8

## 2021-11-08 NOTE — LETTER
November 8, 2021      Saravanan Park  [address] [place] [address]        To Whom It May Concern:    Please excuse Saravanan Park from work 11/5/21 and 11/8/21.       Sincerely,        Kelly Watt MD

## 2021-11-08 NOTE — TELEPHONE ENCOUNTER
Routing to provider for assessment/recommendations    Flare of intermittent foot/ankle swelling beginning 11/3/21    Severe enough to prevent work attendance 11/5/21 and 11/8/21    Patient would like a note for work and recommendations about a visit     Bhargavi Mejia RN on 11/8/2021 at 1:32 PM

## 2021-11-08 NOTE — TELEPHONE ENCOUNTER
Can you tell me more about this swelling?     How long has it been going on?   What caused it?   What about it is preventing you from working?     Once I hear more about your symptoms, I can recommend how to move forward.    Bhargavi Mejia RN on 11/8/2021 at 8:19 AM

## 2021-12-02 ENCOUNTER — VIRTUAL VISIT (OUTPATIENT)
Dept: SURGERY | Facility: CLINIC | Age: 26
End: 2021-12-02
Payer: COMMERCIAL

## 2021-12-02 VITALS — BODY MASS INDEX: 45.51 KG/M2 | HEIGHT: 71 IN

## 2021-12-02 DIAGNOSIS — E66.01 OBESITY, CLASS III, BMI 40-49.9 (MORBID OBESITY) (H): ICD-10-CM

## 2021-12-02 DIAGNOSIS — E11.65 TYPE 2 DIABETES MELLITUS WITH HYPERGLYCEMIA, WITHOUT LONG-TERM CURRENT USE OF INSULIN (H): ICD-10-CM

## 2021-12-02 DIAGNOSIS — G47.19 EXCESSIVE DAYTIME SLEEPINESS: Primary | ICD-10-CM

## 2021-12-02 PROCEDURE — 99214 OFFICE O/P EST MOD 30 MIN: CPT | Mod: GT | Performed by: EMERGENCY MEDICINE

## 2021-12-02 RX ORDER — CHOLECALCIFEROL (VITAMIN D3) 50 MCG
1 TABLET ORAL DAILY
COMMUNITY
End: 2023-01-18

## 2021-12-02 RX ORDER — LIRAGLUTIDE 6 MG/ML
1.8 INJECTION SUBCUTANEOUS DAILY
Qty: 27 ML | Refills: 3 | Status: SHIPPED | OUTPATIENT
Start: 2021-12-02 | End: 2022-02-16

## 2021-12-02 RX ORDER — PHENTERMINE HYDROCHLORIDE 37.5 MG/1
TABLET ORAL
Qty: 60 TABLET | Refills: 1 | Status: SHIPPED | OUTPATIENT
Start: 2021-12-02 | End: 2022-05-30

## 2021-12-02 NOTE — LETTER
12/2/2021         RE: Saravanan Park  [address] [place] [address]        Dear Colleague,    Thank you for referring your patient, Saravanan Park, to the [facility] [place]. Please see a copy of my visit note below.    Saravanan Park is 26 year old  male who presents for a billable video visit today.    How would you like to obtain your AVS? MyChart  If dropped from the video visit, the video invitation should be resent by: Text to cell phone: [phone]  Will anyone else be joining your video visit? No      Video Start Time: 3:11 PM    Provider Notes:   Bariatric Clinic Follow-Up Visit:    Saravanan Park is a 26 year old  male with Body mass index is 45.51 kg/m .  presenting here today for follow-up on non-surgical efforts for weight loss. Original Intake visit occurred on 2/5/21 with a weight of 344 lbs and BMI of 50.9.  Along with diet and behavior changes, he has been using intermittent phentermine and had been on victoza/metformin for his DMII discovered in 2020 when had severe COVID illness/hospitalization.  See his intake visit notes for details on identified contributors to weight gain in the past. Chart review shows no dietician visits yet (no showed or cancelled previously).     Weight:   Wt Readings from Last 3 Encounters:   10/07/21 148 kg (326 lb 4.8 oz)   10/07/21 149.7 kg (330 lb)   10/01/21 (!) 153.9 kg (339 lb 3.2 oz)    pounds      Comorbidities:  Patient Active Problem List   Diagnosis     Morbid obesity (H)     Type 2 diabetes mellitus without complication, without long-term current use of insulin (H)     Perianal abscess     Vitamin D deficiency     Hyperlipidemia     History of 2019 novel coronavirus disease (COVID-19)     Elevated BP without diagnosis of hypertension       Current Outpatient Medications:      liraglutide (VICTOZA) 18 MG/3ML solution, Inject 1.8 mg Subcutaneous daily, Disp: 27 mL, Rfl: 3     metFORMIN (GLUCOPHAGE-XR) 500 MG 24  hr tablet, Take 1 tablet by mouth twice daily for 7 days, then increase to 2 tablets by mouth twice daily, Disp: , Rfl:      vitamin D3 (CHOLECALCIFEROL) 50 mcg (2000 units) tablet, Take 1 tablet by mouth daily, Disp: , Rfl:       Interim: Since our last visit, he has had a recheck A1c w/ slight increase to 7.7% from 7.2% (July to October). victoza and metformin are good but needs refill for victoza. He ran out of phentermine. Sleep study has not been done so new referral placed today and he'll get this done this winter (high probability for FERMIN).   occ vaping still.   Plan:   1.  Diet: follow up with dietician for guided targets for nourishing your weight loss. 25g of lean protein per meal is a good place to start, trying to get meals in every 5-6hours with two snacks daily of about 100kcal and 5-10g of protein.  2. Exercise: you felt getting back to the gym would be doable this month. Start slower the first 2-3 weeks to avoid injury/over straining. Focus on the new routine and after the first 6-7 workouts you can start to increase distance/weight/time by 5% weekly.   3. Medication: refilled phentermine: use half a tablet AFTER breakfast. Refilled Victoza, 1.8mg injected daily. If A1c is responding well this winter, consider change to Ozempic if insurance covers well. Continue your metformin and vitamin D3.  4.  Referral for sleep study given high risk for FERMIN.  5. Goals: great work with achieving goal #1 of 5% weight reduction, a good goal this winter is to get weight under 300 lbs. About a 2lb weight loss weekly is a sign that you're in a good spot with your diet change/activity.      We discussed [facility] Bariatric Basics including:  -eating 3 meals daily  -reviewed metabolic needs for weight loss based on Resting Metabolic Rate  -protein goals supportive of healthy weight loss  -avoiding/limiting calorie containing beverages  -We discussed the importance of restorative sleep and stress management in  "maintaining a healthy weight.  -We discussed the National Weight Control Registry healthy weight maintenance strategies and ways to optimize metabolism.  -We discussed the importance of physical activity including cardiovascular and strength training in maintaining a healthier weight and explored viable options.    Most recent labs:  Lab Results   Component Value Date    WBC 6.9 01/22/2021    HGB 16.7 10/07/2021    HCT 47.0 01/22/2021    MCV 87 01/22/2021     01/22/2021     Lab Results   Component Value Date    CHOL 197 09/18/2020     Lab Results   Component Value Date    HDL 45 09/18/2020     No components found for: LDLCALC  Lab Results   Component Value Date    TRIG 152 (H) 09/18/2020     No components found for: CHOLHDL  Lab Results   Component Value Date    ALT 33 03/17/2021    AST 27 03/17/2021    ALKPHOS 43 (L) 03/17/2021     No results found for: HGBA1C  No components found for: KKRILBIO70  No components found for: [id number]  No components found for: FERRITIN  No components found for: PTH  No components found for: 43891  No components found for: 7597  Lab Results   Component Value Date    TSH 2.20 03/17/2021     No results found for: TESTOSTERONE    DIETARY HISTORY    Positive Changes Since Last Visit: feeling more motivated for exercise  Struggling With: routine with food. Hasn't had good instruction with dietician yet so we've arranged.    Knowledgeable in Reading Food Labels: no  Getting Adequate Protein: no  Sleeping 7-8 hours/day no      PHYSICAL ACTIVITY PATTERNS:  Cardiovascular: planning gym again  Strength Training: same.    REVIEW OF SYSTEMS  Perianal fistulas healing well after procedure in October. Tolerates medications. .  PHYSICAL EXAM:  Vitals: Ht 1.803 m (5' 11\")   BMI 45.51 kg/m    Weight:   Wt Readings from Last 3 Encounters:   10/07/21 148 kg (326 lb 4.8 oz)   10/07/21 149.7 kg (330 lb)   10/01/21 (!) 153.9 kg (339 lb 3.2 oz)     He reports no recent weight since " October.    GEN: Pleasant, well groomed, in no acute distress .  NECK: No swelling. But thick.  .  LUNGS: No respiratory difficulty noted. No cough..  ABDOMEN: obese centrally.  EXTREMITIES: No tremor...  NEURO: Alert and Oriented X3, fluent speech..  SKIN: No visible rashes..    Medical Decision Making:  Interim study results: A1c increased as above. .      30 minutes spent on the date of the encounter doing chart review, history and exam, documentation and further activities per the note   Dru Rodriguez MD  [facility]  3:11 PM  12/2/2021      Video-Visit Details    Type of service:  Video Visit    Video End Time (time video stopped): 3:36 PM  Originating Location (pt. Location): Home    Distant Location (provider location):  [facility] [place]     Platform used for Video Visit: [employer]      Again, thank you for allowing me to participate in the care of your patient.        Sincerely,        Dru Rodriguez MD

## 2021-12-02 NOTE — PATIENT INSTRUCTIONS
Plan:   1.  Diet: follow up with dietician for guided targets for nourishing your weight loss. 25g of lean protein per meal is a good place to start, trying to get meals in every 5-6hours with two snacks daily of about 100kcal and 5-10g of protein.  2. Exercise: you felt getting back to the gym would be doable this month. Start slower the first 2-3 weeks to avoid injury/over straining. Focus on the new routine and after the first 6-7 workouts you can start to increase distance/weight/time by 5% weekly.   3. Medication: refilled phentermine: use half a tablet AFTER breakfast. Refilled Victoza, 1.8mg injected daily. If A1c is responding well this winter, consider change to Ozempic if insurance covers well. Continue your metformin and vitamin D3.  4.  Referral for sleep study given high risk for FERMIN.  5. Goals: great work with achieving goal #1 of 5% weight reduction, a good goal this winter is to get weight under 300 lbs. About a 2lb weight loss weekly is a sign that you're in a good spot with your diet change/activity.        LEAN PROTEIN SOURCES  Getting 20-30 grams of protein, 3 meals daily, is appropriate for most people, some need more but more than about 40 grams per meal is not useful.  General rule is drinking one ounce of water per gram of protein eaten over the course of the day:  70 grams of protein each day, drink 70 oz of water.  Protein Source Portion Calories Grams of Protein                           Nonfat, plain Greek yogurt    (10 grams sugar or less) 3/4 cup (6 oz)  12-17   Light Yogurt (10 grams sugar or less) 3/4 cup (6 oz)  6-8   Protein Shake 1 shake 110-180 15-30   Skim/1% Milk or lactose-free milk 1 cup ( 8 oz)  8   Plain or light, flavored soymilk 1 cup  7-8   Plain or light, hemp milk 1 cup 110 6   Fat Free or 1% Cottage Cheese 1/2 cup 90 15   Part skim ricotta cheese 1/2 cup 100 14   Part skim or reduced fat cheese slices 1 ounce 65-80 8     Mozzarella String Cheese  1 80 8   Canned tuna, chicken, crab or salmon  (canned in water)  1/2 cup 100 15-20   White fish (broiled, grilled, baked) 3 ounces 100 21   [place]/Tuna (broiled, grilled, baked) 3 ounces 150-180 21   Shrimp, Scallops, Lobster, Crab 3 ounces 100 21   Pork loin, Pork Tenderloin 3 ounces 150 21   Boneless, skinless chicken /turkey breast                          (broiled, grilled, baked) 3 ounces 120 21   [place], [place], [place], and Venison 3 ounces 120 21   Lean cuts of red meat and pork (sirloin,   round, tenderloin, flank, ground 93%-96%) 3 ounces 170 21   Lean or Extra Lean Ground Turkey 1/2 cup 150 20   90-95% Lean [place] Burger 1 rachel 140-180 21   Low-fat casserole with lean meat 3/4 cup 200 17   Luncheon Meats                                                        (turkey, lean ham, roast beef, chicken) 3 ounces 100 21   Egg (boiled, poached, scrambled) 1 Egg 60 7   Egg Substitute 1/2 cup 70 10   Nuts (limit to 1 serving per day)  3 Tbsp. 150 7   Nut [place] (peanut, almond)  Limit to 1 serving or less daily 1 Tbsp. 90 4   Soy Burger (varies) 1  15   Garbanzo, Black, Gamez Beans 1/2 cup 110 7   Refried Beans 1/2 cup 100 7   Kidney and Lima beans 1/2 cup 110 7   Tempeh 3 oz 175 18   Vegan crumbles 1/2 cup 100 14   Tofu 1/2 cup 110 14   Chili (beans and extra lean beef or turkey) 1 cup 200 23   Lentil Stew/Soup 1 cup 150 12   Black Bean Soup 1 cup 175 12         Example Meal Plan for a 0989-8803 Calorie Diet:  Coe, for you this is not enough food. Increase portions by 30%, aiming for 2000kcal/day for now and a protein goal of 80-90g/day for now.  The example below is more a routine example than strict diet instructions.    In order to fuel your weight loss properly and avoid hunger-induced overeating later in the day, for your height and weight, you will enjoy the most success by following the diet below or similar with adjustments based on your particular tastes and preferences.  Exercise may influence  speed, amount of weight loss further.     I recommend getting into a meal routine and keeping it similar day to day in the beginning so you don t have to think too hard about what you re going to make/eat.  Keep snacks healthy, ideally containing protein and some vegetables.  Non-processed food is preferable to packaged items.  Eat at least a few crunchy green vegetables if having a snack, which should be 2-3 hours after your mealtimes(prepare these ahead of time for ease of use).  Drink 64 oz -80 oz of water daily for most, some of you will need more and we'll discuss it at your visit if that is the case.  When changing our diet and reducing our intake,  we can often mistake thirst for hunger or just have some grazing habits we have to break ('bored/mindless eating') and a glass of water and reconsideration of our hunger is often all that is needed.  Having the urge is not the problem, but watching it pass by without acting on it is the goal.    If you re having hunger problems, add a protein drink/snack to your morning hours or afternoon snack with at least 20grams of protein and not too much sugar (under 10g).  A carton of higher protein/low sugar yogurt can work as well.  If the urge to snack is overwhelming and not satiated, try going for a 10 minute walk/exercise, come home and drink a glass of water and if still hungry, have a  calorie snack (handful of raw/sprouted nuts, veggies and string cheese, protein bar, etc).  Savor it.    It is better to have a large breakfast, a moderate lunch and a smaller dinner to fuel your day.  People lose 10-15% more weight during their weight loss season with this strategy. Optimizing your protein intake at each meal will further keep you more satisfied while eating less food overall.  Getting exercise in early has also been shown to offer the best results (before breakfast ideally but anytime is the right time to exercise if that is not an option for you).    To make  sure you re getting adequate vitamins and minerals during weight loss, I recommend one complete multivitamin a day of your choice.  Consider a probiotic and taking some vitamin D 2000 IU daily.    Let supper be your last meal of the day and ideally try to have at least 12 hours between supper and breakfast the next day to tap into some beneficial overnight fasting dynamics.  Water in the evening is fine, unsweetened, non caffeinated herbal tea is helpful as well.  Consolidating your meals within a 8-12 hour period of your day will help tap into these additional metabolic benefits and tends to keep your appetite up for breakfast, further helping to stay on track.  For most of my patients I don't recommend an intermittent fasting style diet (many find it hard to fit in their lifestyle) but an overnight fast is very doable for most patients and helps regulate our hunger drives a little better.  This makes it very important to nail good intake at all three meals to feel satisfied/energized and still lose weight.  If evening snacking desires are high, consider a glass of fiber supplement for some additional fullness (metamucil or similar). Most of us don't get the 25-30 grams per day of fiber that promotes good gut health/satiety.  Benefiber, metamucil, citrucel are reasonable/affordable options for most people.  Inulin, chicory, psyllium husk are reasonable options but start slow and low in the dose to avoid gas/bloating until your gut gets acclimated (ramping up to 5-10 grams per day of supplemental fiber after 3-4 weeks if needed).      Example Meal Plan:  Breakfast: 450-475 Calories  1 egg cooked on low in olive oil:   calories.  5oz Greek Yogurt (Fage plain classic: ~150 tia)  Handful of Berries of your choice (about a calorie per berry or 20-40cal per handful)    cup(cooked) of  old fashioned oatmeal or 1/2 cup(cooked) steel cut oats. (150 tia)  Sprinkle amount of brown sugar and a pat of butter. (40  tia)  Glass of  Water  Black coffee or unsweetened Tea (0calories).      2-3 hours Later Snack: (195 calories).  Glass of water  One string Cheese (80 calories) or 4 oz creamed cottage cheese (115 calories) with  Crunchy Celery sticks (less than 10 calories per large stalk) 2 stalks. (20 calories)    of a  Large Banana or   of a Large Apple (60 calories):  eat second half at lunch or afternoon snack.     Lunch:300 -350 calories   Chicken Breast  (baked/broiled/roasted/grilled)  4-6 oz.  (125-180 tia), BBQ sauce/hot sauce/mustard/seasoning is free. Just use a reasonable amount. Or a can of tuna with 1 tablespoon mayonnaise.  Salad: lettuce, any other veggies (cucumbers, green peppers/celery you like and a small drizzle of dressing to just flavor.  Go as big on the veggies as you like,  as they are practically calorie free.   A whole, 8 inch cucumber is 45 calories, a whole green pepper is 23 calories, a stalk of celery is 9 calories.  [place] Dressing is 60 calories per tablespoon..so moderate your desired dressing or do a drizzle of olive oil and splash of balsamic vinegar on top,  Total calories unlikely to be over 150 even with dressing.  Glass of Water.    Option for lunch is meal replacement protein drink/smoothie.  Need at least 20 grams of protein and eat the rest of your apple/banana from the morning snack.      Afternoon Snack: 150-200 calories   Cheese Stick or cottage cheese again  and a fresh fruit OR  Granola Bar (protein Bar acceptable if under 200 calories OR  Homemade smoothies:  8oz skim milk,  a handful of berries (fresh or frozen and a serving of protein powder such as BiPro or Lisa sWhey for example.  If you don't like dairy, make with 8oz water, one small banana, handful of berries and the protein powder, add any veggies you want as well:  roughly 200 calories.   Glass of Water    Dinner: 325 calories  4oz of fresh, [place] salmon.  Broiled (salt/pepper/dill) for about 8-8.5 minutes  (200calories) or  4oz filet mignon steak or sirloin steak  Salad or vegetable sautéed lightly in olive oil or   Broccoli 1.5  cups chopped and steamed  or micro-waved in a little water (75 calories)  Glass of Water,    Cup of herbal tea (unsweetened, caffeine free)      Herbs and seasonings are encouraged to flavor your foods/vegetables.  Make your food delicious.      Tips for Success:  1.  Prepare proteins ahead of time (broil chicken breasts in bulk so you can grab and go), steel cut oats/lentils can be stored in casserole dish/bowl in the fridge for quick scoop in the morning and rewarm in microwave, make use of crock pot recipes (watch salt content).  Making meals that cover 3-4 future meals is an easy way to stay on track.  2.  Drink a 8-12 oz glass of water every 2-3 hours when awake.  We often mistake hunger for thirst, especially when losing weight.  3. Remember your Reward and Motivation when things get hard.  4.  Weigh yourself every morning and record, you'll stay on track better and learn how our biorhythms, diet and elimination patterns show up on the scale. Don't worry about 1 or 2 day patterns, but when on track you'll notice good trend downward of weight over 3-4 day segments.  Plateaus tend to resolve after 4-8 days in most cases if you stay consistent with your plan.  These are natural and part of weight loss, even if you're perfect with your plan execution.  5. Call if problems/concerns.  [employer] is a great tool to stay in touch and provide weekly outside accountability. Check in with questions or if you want to brag.  6.  Find a handful of meals/foods that keep you on track and feeling good and get into a routine that is sustainable for you.  It's OK to have a routine that works for you.  7.  Consider taking a complete multivitamin just to make sure all micronutrients are adequate during weight loss.  8. If losing hair/brittle nails it usually means you are not taking enough protein.  Minimum  "goal is 60 grams daily of protein for smaller women, 80 grams a day for men. Consider taking Biotin as supplement or a \"Hair and Nail\" multivitamin.      "

## 2021-12-03 ENCOUNTER — TELEPHONE (OUTPATIENT)
Dept: SURGERY | Facility: CLINIC | Age: 26
End: 2021-12-03
Payer: COMMERCIAL

## 2021-12-03 NOTE — TELEPHONE ENCOUNTER
Prior Authorization Retail Medication Request    Medication/Dose: Victoza 18mg/3ml  ICD code (if different than what is on RX):    Previously Tried and Failed:    Rationale:      Insurance Name:  [employer]  Insurance ID:  [id number]    Key: [id number]    Pharmacy Information (if different than what is on RX)  Name:  [facility]  Phone:  [phone]

## 2021-12-06 NOTE — TELEPHONE ENCOUNTER
Central Prior Authorization Team   Phone: [phone]    PA Initiation    Medication: Victoza 18mg/3ml  Insurance Company: [employer] ([facility]) - Phone [phone] Fax [phone]  Pharmacy Filling the Rx: [id number] - [place], [place] - [address] AT [facility] [address]  Filling Pharmacy Phone: [phone]  Filling Pharmacy Fax:    Start Date: 12/6/2021

## 2021-12-07 NOTE — TELEPHONE ENCOUNTER
Per insurance there is already a PA on file for member.  Pharmacy provided information below along with help desk number to contact to resolve issue.

## 2021-12-10 ENCOUNTER — TELEPHONE (OUTPATIENT)
Dept: SURGERY | Facility: CLINIC | Age: 26
End: 2021-12-10

## 2021-12-10 NOTE — TELEPHONE ENCOUNTER
Called patient when he was not connected for the video nutrition visit. Sent patient a text message video link to connect. Left voicemail instructing patient to connect or to call the clinic (phone number provided) to reschedule appointment.    Kimberly Black RD, LD

## 2022-01-16 ENCOUNTER — HEALTH MAINTENANCE LETTER (OUTPATIENT)
Age: 27
End: 2022-01-16

## 2022-02-16 ENCOUNTER — VIRTUAL VISIT (OUTPATIENT)
Dept: SURGERY | Facility: CLINIC | Age: 27
End: 2022-02-16
Payer: COMMERCIAL

## 2022-02-16 VITALS — HEIGHT: 71 IN | BODY MASS INDEX: 44.1 KG/M2 | WEIGHT: 315 LBS

## 2022-02-16 DIAGNOSIS — R79.89 LOW VITAMIN D LEVEL: ICD-10-CM

## 2022-02-16 DIAGNOSIS — E66.01 OBESITY, CLASS III, BMI 40-49.9 (MORBID OBESITY) (H): ICD-10-CM

## 2022-02-16 DIAGNOSIS — G47.19 EXCESSIVE DAYTIME SLEEPINESS: ICD-10-CM

## 2022-02-16 DIAGNOSIS — E51.9 THIAMINE DEFICIENCY: ICD-10-CM

## 2022-02-16 DIAGNOSIS — E11.65 TYPE 2 DIABETES MELLITUS WITH HYPERGLYCEMIA, WITHOUT LONG-TERM CURRENT USE OF INSULIN (H): Primary | ICD-10-CM

## 2022-02-16 PROCEDURE — 99214 OFFICE O/P EST MOD 30 MIN: CPT | Mod: GT | Performed by: EMERGENCY MEDICINE

## 2022-02-16 NOTE — LETTER
2/16/2022         RE: Saravanan Park  [address] [place] [address]        Dear Colleague,    Thank you for referring your patient, Saravanan Park, to the [facility] [place]. Please see a copy of my visit note below.    Saravanan Park is 26 year old  male who presents for a billable video visit today.    How would you like to obtain your AVS? MyChart  If dropped from the video visit, the video invitation should be resent by: Text to cell phone: [phone]  Will anyone else be joining your video visit? No      Video Start Time: 3:11 PM    Are there any specific questions or needs that you would like addressed at your visit today?     Provider Notes:   Bariatric Clinic Follow-Up Visit:    Saravanan Park is a 26 year old  male with Body mass index is 45.05 kg/m .  presenting here today for follow-up on non-surgical efforts for weight loss. Original Intake visit occurred on 2/5/21 with a weight of 344 lbs and BMI of 50.9 with co-morbid type II diabetes that emerged around the time of his severe COVID infection in 2020.  Along with diet and behavior changes, he has been using phentermine, and his metformin/victoza to assist his weight loss goals.  See his intake visit notes for details on identified contributors to weight gain in the past. Chart review shows Dietician visit was missed in December.    Weight:   Wt Readings from Last 3 Encounters:   02/16/22 146.5 kg (323 lb)   10/07/21 148 kg (326 lb 4.8 oz)   10/07/21 149.7 kg (330 lb)    pounds      Comorbidities:  Patient Active Problem List   Diagnosis     Morbid obesity (H)     Type 2 diabetes mellitus without complication, without long-term current use of insulin (H)     Perianal abscess     Vitamin D deficiency     Hyperlipidemia     History of 2019 novel coronavirus disease (COVID-19)     Elevated BP without diagnosis of hypertension       Current Outpatient Medications:      liraglutide (VICTOZA) 18 MG/3ML  solution, Inject 1.8 mg Subcutaneous daily, Disp: 27 mL, Rfl: 3     metFORMIN (GLUCOPHAGE-XR) 500 MG 24 hr tablet, Take 1 tablet by mouth twice daily for 7 days, then increase to 2 tablets by mouth twice daily, Disp: , Rfl:      phentermine (ADIPEX-P) 37.5 MG tablet, Start half tablet daily after breakfast., Disp: 60 tablet, Rfl: 1     vitamin D3 (CHOLECALCIFEROL) 50 mcg (2000 units) tablet, Take 1 tablet by mouth daily, Disp: , Rfl:       Interim: Since our last visit, he has come down 7 lbs from the Fall. Victoza was not filled by his pharmacy for unclear reasons as his PA was good through March of 2022 on chart review. Metformin tolerated. and phentermine is tolerated but has been skipping meals lately which likely contributed to stalled weight loss in the 320s. Reviewed the need for 3 meals daily, reminding self to get protein rich meals. Given some difficulty with compliance on daily dosing of Victoza and A1c that was above goal this Fall, switch to Ozempic was discussed today and he's open to a trial if covered by his plan. Dual appetite/glycemic benefits anticipated.     . Weight down 21 lbs from intake last year, about 7.5% total body weight reduction. His sleep evaluation has not happened and encouraged to set this up.  He missed the dietician visit in December, 2021.   Gym at least 1-3 times weekly. Lifting regularly w/ '[email or web address]'. The Gym he goes to is in his daisy's basement.    Plan:   1.  Diet: aiming for 1700-1800kcal/day and 80grams of protein daily to help continued weight loss. You missed your dietician visit in December and I encourage you to have a visit as right now the tendency to skip meals leaves you vulnerable to over compensation later in the evenings/weekends. Try to increase to 3 meals daily with the first within 2-3 hours of walking or at least starting with a protein or protein drink of 20-30g of protein.   With your bloating, too much starch or dairy could be contributing and  "increased green vegetable intake and good water intake may help. Consider a probiotic like [employer] Therbiotic 25billion CFUS once or twice daily for a month to calm bowels.   2. Exercise: continue gym workouts with strength training.  3. Medication: given trouble w/ Victoza coverage use and A1c still at 7.7% last fall, trial of Ozempic was put in today:  If covered, start 0.25mg injected once WEEKLY (usually a Thursday) for 4 weeks then increase to 0.5mg once weekly. Remember to \"prime\" the pen (ask the pharmacist to show you the setting). Continue the 0.5mg dose for one more pen and if doing well, we'll increase to the 1.0mg pen thereafter.  4.  Check A1c/labs this next month (orders placed). With metformin we check B1/B12 levels as well. Using a B complex vitamin is recommended for diabetics for some support and if deficient I'll boost you up and give recommendations.  5. Goals: great work with over 7% weight reduction this year. Aiming to get under 300 lbs this spring/summer if all going well.   6. Recheck in 2 months to review med change and lab results.  Check up on 2/25/22 w/ Dietician as planned.  7. We discussed a referral last time for sleep evaluation given high risk for sleep apnea. If you haven't followed this up yet, give them a call at [phone]. Referral was a year ago.      We discussed [facility] Bariatric Basics including:  -eating 3 meals daily  -reviewed metabolic needs for weight loss based on Resting Metabolic Rate  -protein goals supportive of healthy weight loss  -avoiding/limiting calorie containing beverages  -We discussed the importance of restorative sleep and stress management in maintaining a healthy weight.  -We discussed the National Weight Control Registry healthy weight maintenance strategies and ways to optimize metabolism.  -We discussed the importance of physical activity including cardiovascular and strength training in maintaining a healthier weight and explored viable " "options.    Most recent labs:  Lab Results   Component Value Date    WBC 6.9 2021    HGB 16.7 10/07/2021    HCT 47.0 2021    MCV 87 2021     2021     Lab Results   Component Value Date    CHOL 197 2020     Lab Results   Component Value Date    HDL 45 2020     No components found for: LDLCALC  Lab Results   Component Value Date    TRIG 152 (H) 2020     No components found for: CHOLHDL  Lab Results   Component Value Date    ALT 33 2021    AST 27 2021    ALKPHOS 43 (L) 2021     No results found for: HGBA1C  No components found for: LIPYIBRJ34  No components found for: HFOOTQDT02IV  No components found for: FERRITIN  No components found for: PTH  No components found for: 04585  No components found for: 7597  Lab Results   Component Value Date    TSH 2.20 2021     No results found for: TESTOSTERONE    DIETARY HISTORY    Positive Changes Since Last Visit: working out a least 2 days weekly now  Struggling With: meal timing/consistency/protein intake. victoza wasn't picked up last time either due to confusion either in his coverage/pharamcy or self. Sometimes would miss doses so interested in once weekly option.    Knowledgeable in Reading Food Labels: no  Getting Adequate Protein: no  Sleeping 7-8 hours/day yes  Stress management gym workouts/friends    PHYSICAL ACTIVITY PATTERNS:  Cardiovascular: limited to walking  Strength Trainin days weekly often, sometimes 3.    REVIEW OF SYSTEMS  Some abdominal bloating. .  PHYSICAL EXAM:  Vitals: Ht 1.803 m (5' 11\")   Wt 146.5 kg (323 lb)   BMI 45.05 kg/m    Weight:   Wt Readings from Last 3 Encounters:   22 146.5 kg (323 lb)   10/07/21 148 kg (326 lb 4.8 oz)   10/07/21 149.7 kg (330 lb)         GEN: Pleasant, well groomed, in no acute distress  HEENT:  n/a .  NECK: thick neck  HEART: n/a.  LUNGS: No respiratory difficulty noted. No cough. .  ABDOMEN: n/a.  EXTREMITIES: No tremor. Ambulation is " indepedent..  NEURO: Alert and Oriented X3, fluent speech. Appears tired. Facies symmeteric but originally on video in dark room looked to have left eye Ptosis that was in fact artifactual on EOM exam. No sign of entrapment/nystagmus. .  SKIN: No visible rashes. .    Medical Decision Making:  Interim study results: none..      30 minutes spent on the date of the encounter doing chart review, history and exam, documentation and further activities per the note   Dru Rodriguez MD  [facility]  3:11 PM  2/16/2022      Video-Visit Details    Type of service:  Video Visit    Video End Time (time video stopped): 3:47 PM  Originating Location (pt. Location): Home    Distant Location (provider location):  [facility]     Platform used for Video Visit: AmWell      Again, thank you for allowing me to participate in the care of your patient.        Sincerely,        Dru Rodriguez MD

## 2022-02-16 NOTE — PROGRESS NOTES
Saravanan Park is 26 year old  male who presents for a billable video visit today.    How would you like to obtain your AVS? MyChart  If dropped from the video visit, the video invitation should be resent by: Text to cell phone: [phone]  Will anyone else be joining your video visit? No      Video Start Time: 3:11 PM    Are there any specific questions or needs that you would like addressed at your visit today?     Provider Notes:   Bariatric Clinic Follow-Up Visit:    Saravanan Park is a 26 year old  male with Body mass index is 45.05 kg/m .  presenting here today for follow-up on non-surgical efforts for weight loss. Original Intake visit occurred on 2/5/21 with a weight of 344 lbs and BMI of 50.9 with co-morbid type II diabetes that emerged around the time of his severe COVID infection in 2020.  Along with diet and behavior changes, he has been using phentermine, and his metformin/victoza to assist his weight loss goals.  See his intake visit notes for details on identified contributors to weight gain in the past. Chart review shows Dietician visit was missed in December.    Weight:   Wt Readings from Last 3 Encounters:   02/16/22 146.5 kg (323 lb)   10/07/21 148 kg (326 lb 4.8 oz)   10/07/21 149.7 kg (330 lb)    pounds      Comorbidities:  Patient Active Problem List   Diagnosis     Morbid obesity (H)     Type 2 diabetes mellitus without complication, without long-term current use of insulin (H)     Perianal abscess     Vitamin D deficiency     Hyperlipidemia     History of 2019 novel coronavirus disease (COVID-19)     Elevated BP without diagnosis of hypertension       Current Outpatient Medications:      liraglutide (VICTOZA) 18 MG/3ML solution, Inject 1.8 mg Subcutaneous daily, Disp: 27 mL, Rfl: 3     metFORMIN (GLUCOPHAGE-XR) 500 MG 24 hr tablet, Take 1 tablet by mouth twice daily for 7 days, then increase to 2 tablets by mouth twice daily, Disp: , Rfl:      phentermine (ADIPEX-P) 37.5 MG tablet, Start  half tablet daily after breakfast., Disp: 60 tablet, Rfl: 1     vitamin D3 (CHOLECALCIFEROL) 50 mcg (2000 units) tablet, Take 1 tablet by mouth daily, Disp: , Rfl:       Interim: Since our last visit, he has come down 7 lbs from the Fall. Victoza was not filled by his pharmacy for unclear reasons as his PA was good through March of 2022 on chart review. Metformin tolerated. and phentermine is tolerated but has been skipping meals lately which likely contributed to stalled weight loss in the 320s. Reviewed the need for 3 meals daily, reminding self to get protein rich meals. Given some difficulty with compliance on daily dosing of Victoza and A1c that was above goal this Fall, switch to Ozempic was discussed today and he's open to a trial if covered by his plan. Dual appetite/glycemic benefits anticipated.     . Weight down 21 lbs from intake last year, about 7.5% total body weight reduction. His sleep evaluation has not happened and encouraged to set this up.  He missed the dietician visit in December, 2021.   Gym at least 1-3 times weekly. Lifting regularly w/ '[employer] The Gym he goes to is in his [employer]'s basement.    Plan:   1.  Diet: aiming for 1700-1800kcal/day and 80grams of protein daily to help continued weight loss. You missed your dietician visit in December and I encourage you to have a visit as right now the tendency to skip meals leaves you vulnerable to over compensation later in the evenings/weekends. Try to increase to 3 meals daily with the first within 2-3 hours of walking or at least starting with a protein or protein drink of 20-30g of protein.   With your bloating, too much starch or dairy could be contributing and increased green vegetable intake and good water intake may help. Consider a probiotic like [employer] Therbiotic 25billion CFUS once or twice daily for a month to calm bowels.   2. Exercise: continue gym workouts with strength training.  3. Medication: given trouble w/ Victoza  "coverage use and A1c still at 7.7% last fall, trial of Ozempic was put in today:  If covered, start 0.25mg injected once WEEKLY (usually a Thursday) for 4 weeks then increase to 0.5mg once weekly. Remember to \"prime\" the pen (ask the pharmacist to show you the setting). Continue the 0.5mg dose for one more pen and if doing well, we'll increase to the 1.0mg pen thereafter.  4.  Check A1c/labs this next month (orders placed). With metformin we check B1/B12 levels as well. Using a B complex vitamin is recommended for diabetics for some support and if deficient I'll boost you up and give recommendations.  5. Goals: great work with over 7% weight reduction this year. Aiming to get under 300 lbs this spring/summer if all going well.   6. Recheck in 2 months to review med change and lab results.  Check up on 2/25/22 w/ Dietician as planned.  7. We discussed a referral last time for sleep evaluation given high risk for sleep apnea. If you haven't followed this up yet, give them a call at [phone]. Referral was a year ago.      We discussed [facility] Bariat[facility] Basics including:  -eating 3 meals daily  -reviewed metabolic needs for weight loss based on Resting Metabolic Rate  -protein goals supportive of healthy weight loss  -avoiding/limiting calorie containing beverages  -We discussed the importance of restorative sleep and stress management in maintaining a healthy weight.  -We discussed the National Weight Control Registry healthy weight maintenance strategies and ways to optimize metabolism.  -We discussed the importance of physical activity including cardiovascular and strength training in maintaining a healthier weight and explored viable options.    Most recent labs:  Lab Results   Component Value Date    WBC 6.9 01/22/2021    HGB 16.7 10/07/2021    HCT 47.0 01/22/2021    MCV 87 01/22/2021     01/22/2021     Lab Results   Component Value Date    CHOL 197 09/18/2020     Lab Results   Component Value Date    " "HDL 45 2020     No components found for: LDLCALC  Lab Results   Component Value Date    TRIG 152 (H) 2020     No components found for: CHOLHDL  Lab Results   Component Value Date    ALT 33 2021    AST 27 2021    ALKPHOS 43 (L) 2021     No results found for: HGBA1C  No components found for: AVSAANER23  No components found for: [id number]  No components found for: FERRITIN  No components found for: PTH  No components found for: 75116  No components found for: 7597  Lab Results   Component Value Date    TSH 2.20 2021     No results found for: TESTOSTERONE    DIETARY HISTORY    Positive Changes Since Last Visit: working out a least 2 days weekly now  Struggling With: meal timing/consistency/protein intake. victoza wasn't picked up last time either due to confusion either in his coverage/pharamcy or self. Sometimes would miss doses so interested in once weekly option.    Knowledgeable in [place] [employer]: no  Getting Adequate Protein: no  Sleeping 7-8 hours/day yes  Stress management gym workouts/friends    PHYSICAL ACTIVITY PATTERNS:  Cardiovascular: limited to walking  Strength Trainin days weekly often, sometimes 3.    REVIEW OF SYSTEMS  Some abdominal bloating. .  PHYSICAL EXAM:  Vitals: Ht 1.803 m (5' 11\")   Wt 146.5 kg (323 lb)   BMI 45.05 kg/m    Weight:   Wt Readings from Last 3 Encounters:   22 146.5 kg (323 lb)   10/07/21 148 kg (326 lb 4.8 oz)   10/07/21 149.7 kg (330 lb)         GEN: Pleasant, well groomed, in no acute distress  HEENT:  n/a .  NECK: thick neck  HEART: n/a.  LUNGS: No respiratory difficulty noted. No cough. .  ABDOMEN: n/a.  EXTREMITIES: No tremor. Ambulation is indepedent..  NEURO: Alert and Oriented X3, fluent speech. Appears tired. Facies symmeteric but originally on video in dark room looked to have left eye Ptosis that was in fact artifactual on EOM exam. No sign of entrapment/nystagmus. .  SKIN: No visible rashes. .    Medical Decision " Making:  Interim study results: none..      30 minutes spent on the date of the encounter doing chart review, history and exam, documentation and further activities per the note   Dru Rodriguez MD  [facility]  3:11 PM  2/16/2022      Video-Visit Details    Type of service:  Video Visit    Video End Time (time video stopped): 3:47 PM  Originating Location (pt. Location): Home    Distant Location (provider location):  [facility]     Platform used for Video Visit: Facile System

## 2022-02-16 NOTE — PATIENT INSTRUCTIONS
"  Plan:   1.  Diet: aiming for 1700-1800kcal/day and 80grams of protein daily to help continued weight loss. You missed your dietician visit in December and I encourage you to have a visit as right now the tendency to skip meals leaves you vulnerable to over compensation later in the evenings/weekends. Try to increase to 3 meals daily with the first within 2-3 hours of walking or at least starting with a protein or protein drink of 20-30g of protein.   With your bloating, too much starch or dairy could be contributing and increased green vegetable intake and good water intake may help. Consider a probiotic like [employer] Therbiotic 25billion CFUS once or twice daily for a month to calm bowels.   2. Exercise: continue gym workouts with strength training.  3. Medication: given trouble w/ Victoza coverage use and A1c still at 7.7% last fall, trial of Ozempic was put in today:  If covered, start 0.25mg injected once WEEKLY (usually a Thursday) for 4 weeks then increase to 0.5mg once weekly. Remember to \"prime\" the pen (ask the pharmacist to show you the setting). Continue the 0.5mg dose for one more pen and if doing well, we'll increase to the 1.0mg pen thereafter.  4.  Check A1c/labs this next month (orders placed). With metformin we check B1/B12 levels as well. Using a B complex vitamin is recommended for diabetics for some support and if deficient I'll boost you up and give recommendations.  5. Goals: great work with over 7% weight reduction this year. Aiming to get under 300 lbs this spring/summer if all going well.   6. Recheck in 2 months to review med change and lab results.  Check up on 2/25/22 w/ Dietician as planned.  7. We discussed a referral last time for sleep evaluation given high risk for sleep apnea. If you haven't followed this up yet, give them a call at [phone].         Example Meal Plan for a 3185-3179 Calorie Diet:    In order to fuel your weight loss properly and avoid hunger-induced " overeating later in the day, for your height and weight, you will enjoy the most success by following the diet below or similar with adjustments based on your particular tastes and preferences.  Exercise may influence speed, amount of weight loss further.     I recommend getting into a meal routine and keeping it similar day to day in the beginning so you don t have to think too hard about what you re going to make/eat.  Keep snacks healthy, ideally containing protein and some vegetables.  Non-processed food is preferable to packaged items.  Eat at least a few crunchy green vegetables if having a snack, which should be 2-3 hours after your mealtimes(prepare these ahead of time for ease of use).  Drink 64 oz -80 oz of water daily for most, some of you will need more and we'll discuss it at your visit if that is the case.      When changing our diet,  we can often mistake thirst for hunger or just have some distracted eating habits that we need to break free from ('bored/mindless eating', screen time,work, driving,etc).  A glass of water and reconsideration of our hunger is often all that is needed.  Having the urge is not the problem, but watching it pass by without acting on it is the goal.    If you re having hunger problems, add a protein drink/snack to your morning hours or afternoon snack with at least 20grams of protein and not too much sugar (under 10g).  A carton of higher protein/low sugar yogurt can work as well.  If the urge to snack is overwhelming and not satiated, try going for a 10 minute walk/exercise, come home and drink a glass of water and if still hungry, have a  calorie snack (handful of raw/sprouted nuts, veggies and string cheese, protein bar, etc).  Savor it.    It is better to have a large breakfast, a moderate lunch and a smaller dinner to fuel your day.  People lose 10-15% more weight during their weight loss season with this strategy. Optimizing your protein intake at each meal will  further keep you more satisfied while eating less food overall.  Getting exercise in early has also been shown to offer the best results (before breakfast ideally but anytime is the right time to exercise if that is not an option for you).    To make sure you re getting adequate vitamins and minerals during weight loss, I recommend one complete multivitamin a day of your choice.  Consider a probiotic and taking some vitamin D 2000 IU daily.    Let supper be your last meal of the day and ideally try to have at least 12 hours between supper and breakfast the next day to tap into some beneficial overnight fasting dynamics.  Midnight snacks need to go away. Water in the evening is fine, unsweetened, non caffeinated herbal tea is helpful as well.  Consolidating your meals within a 8-12 hour period of your day will help tap into these additional metabolic benefits and tends to keep your appetite up for breakfast, further helping to stay on track.  For most of my patients, I don't recommend an intermittent fasting style diet (many find it hard to fit in their lifestyle) but an overnight fast is very doable for most patients and helps regulate our hunger drives a little better.  This makes it very important to nail good intake at all three meals to feel satisfied/energized and still lose weight.      If evening snacking desires are high, consider a glass of fiber supplement for some additional fullness (metamucil or similar). Most of us don't get the 25-30 grams per day of fiber that promotes good gut health/satiety.  Benefiber, metamucil, citrucel are reasonable/affordable options for most people.  Inulin, chicory, psyllium husk are reasonable options but start slow and low in the dose to avoid gas/bloating until your gut gets acclimated (ramping up to 5-10 grams per day of supplemental fiber after 3-4 weeks if needed).      Example Meal Plan:  Breakfast: 450-475 Calories  1 egg cooked on low in olive oil:    calories.  5oz Greek Yogurt (Fage plain classic: ~150 tia)  Handful of Berries of your choice (about a calorie per berry or 20-40cal per handful)    cup(cooked) of  old fashioned oatmeal or 1/2 cup(cooked) steel cut oats. (150 tia)  Sprinkle amount of brown sugar and a pat of butter. (40 tia)  Glass of  Water  Black coffee or unsweetened Tea (0calories).      2-3 hours Later Snack: (195 calories).  Glass of water  One string Cheese (80 calories) or 4 oz creamed cottage cheese (115 calories) with  Crunchy Celery sticks (less than 10 calories per large stalk) 2 stalks. (20 calories)    of a  Large Banana or   of a Large Apple (60 calories):  eat second half at lunch or afternoon snack.     Lunch:300 -350 calories   Chicken Breast  (baked/broiled/roasted/grilled)  4-6 oz.  (125-180 tia), BBQ sauce/hot sauce/mustard/seasoning is free. Just use a reasonable amount. Or a can of tuna with 1 tablespoon mayonnaise.  Salad: lettuce, any other veggies (cucumbers, green peppers/celery you like and a small drizzle of dressing to just flavor.  Go as big on the veggies as you like,  as they are practically calorie free.   A whole, 8 inch cucumber is 45 calories, a whole green pepper is 23 calories, a stalk of celery is 9 calories.  [place] Dressing is 60 calories per tablespoon..so moderate your desired dressing or do a drizzle of olive oil and splash of balsamic vinegar on top,  Total calories unlikely to be over 150 even with dressing.  Glass of Water.    Option for lunch is meal replacement protein drink/smoothie.  Need at least 20 grams of protein and eat the rest of your apple/banana from the morning snack.      Afternoon Snack: 150-200 calories   Cheese Stick or cottage cheese again  and a fresh fruit OR  Granola Bar (protein Bar acceptable if under 200 calories OR  Homemade smoothies:  8oz skim milk,  a handful of berries (fresh or frozen and a serving of protein powder such as BiPro or Lisa sWhey for example.  If  you don't like dairy, make with 8oz water, one small banana, handful of berries and the protein powder, add any veggies you want as well:  roughly 200 calories.   Glass of Water    Dinner: 325 calories  4oz of fresh, [place] salmon.  Broiled (salt/pepper/dill) for about 8-8.5 minutes (200calories) or  4oz filet mignon steak or sirloin steak  Salad or vegetable sautéed lightly in olive oil or   Broccoli 1.5  cups chopped and steamed  or micro-waved in a little water (75 calories)  Glass of Water,    Cup of herbal tea (unsweetened, caffeine free)      Herbs and seasonings are encouraged to flavor your foods/vegetables.  Make your food delicious.      Tips for Success:  1.  Prepare proteins ahead of time (broil chicken breasts in bulk so you can grab and go), steel cut oats/lentils can be stored in casserole dish/bowl in the fridge for quick scoop in the morning and rewarm in microwave, make use of crock pot recipes (watch salt content).  Making meals that cover 3-4 future meals is an easy way to stay on track.  2.  Drink a 8-12 oz glass of water every 2-3 hours when awake.  We often mistake hunger for thirst, especially when losing weight.  3. Remember your Reward and Motivation when things get hard.  4.  Weigh yourself every morning and record, you'll stay on track better and learn how our biorhythms, diet and elimination patterns show up on the scale. Don't worry about 1 or 2 day patterns, but when on track you'll notice good trend downward of weight over 3-4 day segments.  Plateaus tend to resolve after 4-8 days in most cases if you stay consistent with your plan.  These are natural and part of weight loss, even if you're perfect with your plan execution.  5. Call if problems/concerns.  [employer] is a great tool to stay in touch and provide weekly outside accountability. Check in with questions or if you want to brag.  6.  Find a handful of meals/foods that keep you on track and feeling good and get into a routine  "that is sustainable for you.  It's OK to have a routine that works for you.  7.  Consider taking a complete multivitamin just to make sure all micronutrients are adequate during weight loss.  8. If losing hair/brittle nails it usually means you are not taking enough protein.  Minimum goal is 60 grams daily of protein for smaller women, 80 grams a day for men. Consider taking Biotin as supplement or a \"Hair and Nail\" multivitamin.      LEAN PROTEIN SOURCES  Getting 20-30 grams of protein, 3 meals daily, is appropriate for most people, some need more but more than about 40 grams per meal is not useful.  General rule is drinking one ounce of water per gram of protein eaten over the course of the day:  70 grams of protein each day, drink 70 oz of water.  Protein Source Portion Calories Grams of Protein                           Nonfat, plain Greek yogurt    (10 grams sugar or less) 3/4 cup (6 oz)  12-17   Light Yogurt (10 grams sugar or less) 3/4 cup (6 oz)  6-8   Protein Shake 1 shake 110-180 15-30   Skim/1% Milk or lactose-free milk 1 cup ( 8 oz)  8   Plain or light, flavored soymilk 1 cup  7-8   Plain or light, hemp milk 1 cup 110 6   Fat Free or 1% Cottage Cheese 1/2 cup 90 15   Part skim ricotta cheese 1/2 cup 100 14   Part skim or reduced fat cheese slices 1 ounce 65-80 8     Mozzarella String Cheese 1 80 8   Canned tuna, chicken, crab or salmon  (canned in water)  1/2 cup 100 15-20   White fish (broiled, grilled, baked) 3 ounces 100 21   [place]/Tuna (broiled, grilled, baked) 3 ounces 150-180 21   Shrimp, Scallops, Lobster, Crab 3 ounces 100 21   Pork loin, Pork Tenderloin 3 ounces 150 21   Boneless, skinless chicken /turkey breast                          (broiled, grilled, baked) 3 ounces 120 21   [place], [place], [place], and Venison 3 ounces 120 21   Lean cuts of red meat and pork (sirloin,   round, tenderloin, flank, ground 93%-96%) 3 ounces 170 21   Lean or Extra Lean Ground Turkey 1/2 cup " 150 20   90-95% Lean [place] Burger 1 rachel 140-180 21   Low-fat casserole with lean meat 3/4 cup 200 17   Luncheon Meats                                                        (turkey, lean ham, roast beef, chicken) 3 ounces 100 21   Egg (boiled, poached, scrambled) 1 Egg 60 7   Egg Substitute 1/2 cup 70 10   Nuts (limit to 1 serving per day)  3 Tbsp. 150 7   Nut [place] (peanut, almond)  Limit to 1 serving or less daily 1 Tbsp. 90 4   Soy Burger (varies) 1  15   Garbanzo, Black, Gamez Beans 1/2 cup 110 7   Refried Beans 1/2 cup 100 7   Kidney and Lima beans 1/2 cup 110 7   Tempeh 3 oz 175 18   Vegan crumbles 1/2 cup 100 14   Tofu 1/2 cup 110 14   Chili (beans and extra lean beef or turkey) 1 cup 200 23   Lentil Stew/Soup 1 cup 150 12   Black Bean Soup 1 cup 175 12         MEDICATIONS FOR WEIGHT LOSS  There are several medications available to assist us in weight loss.  By themselves, without compliance to a change in diet and increase in movement/activity these medications are disappointing in their results. However, combined with a closely monitored program of diet change and exercise they can be very effective in controlling appetite and boosting initial weight loss.  All weight loss medications need continual re-evaluation for efficacy as their side effects and health benefits fail to be worthwhile if a person is not continuing to lose weight or in maintaining their healthy weight.  Some weight loss medications are scheduled drugs, meaning there is at least a theoretical possibility for developing addiction to them but in practice this is rare.  We do anticipate coming off meds in the future after stabilization of weight loss is assurred.  Finally, a tolerance can develop and people s perceived efficacy of medication can diminish.  In communication with your physician, it may be appropriate to intermittently take a break from these medications and then restart again (few weeks off then restart again) if a  plateau is reached that cannot be broken through.  Each person can respond to a medication differently and to be a good option for you, it will need to be affordable, effective and well tolerated with minimal side effects.    In most cases, weight loss progress after one month and three months will be obtained and if a patient is not reaching the satisfactory progress towards weight loss, the medications may be discontinued.  The thought is that if a person is taking a weight loss medication and not receiving the potential health benefit of that drug, the side effects are not worthwhile and use should be discontinued.  On the flip side, there are many people on some weight loss medications for years because it continues to be an effective tool in their weight management and they are tolerating the medication without any long-term side effects.  Each person's response and purpose will be evaluated.      PHENTERMINE (Adipex): approved in 1959 for appetite suppression.  It has stimulant effects and cannot be used with Ritalin, Concerta, or other stimulants.  Although it is not highly addictive, it's chemically related to amphetamines which are addictive.  Occasional dependence can develop, but rarely. The most common side effects are dry mouth, increased energy and concentration, increased pulse, and constipation.  You should not take phentermine if you have glaucoma, hyperthyroidism, or uncontrolled/untreated hypertension or overly anxious. You should stop if dramatic mood swings, severe insomnia, palpations, chest pains, visual changes or if your Blood Pressure is consistently elevated or any time it's over 160/90.   It's ok to go off the med for a few weeks and restart if efficacy is wearing off.  $24-$30 for 90 tablets at [facility]. Females are required to have reliable birth control to reduce the risk birth defects/miscarriage.      TOPIRAMATE (Topamax): Anti-seizure medication, also used to prevent  migraines and sometimes for mood stabilization.  Side effects include paresthesia, glaucoma, altered concentration, attention difficulties, memory and speech problems, metabolic acidosis, depression, increase in body temperature and decrease sweating, risk of kidney stones.  Do not take Topamax while taking Depakote as this can cause high ammonia levels.  You must have reliable birth control as Topamax can cause birth defects.  If prolonged use has occurred it should be tapered off slowly to avoid withdrawal issues.  Insurance usually covers Topiramate.  At higher doses, there may be some confusion/forgetfulness associated with this so we try to limit dose to under 75mg twice daily to reduce this risk. Often covered by insurance as it's used for many reasons.  Topamax will cause carbonated beverages to taste bad. A recheck of your kidney/electrolytes may occur within a few months of starting.    QSYMIA (Phentermine + Topamax):  See above information about phentermine and Topamax.  Most common side effects are paresthesia, dizziness, distortion of taste, insomnia, constipation, and dry mouth.  See above descriptions for the two individual agents.Females are required to have reliable birth control to reduce the risk birth defects/miscarriage.  $150-$220 per month      GLP1 Agonists:  Liraglutide (Victoza/Saxenda), Semaglutide (Ozempic/Wegovy):   Part of the family of Glucagon Like Peptide Agonists, these medications directly suppresses appetite and are often used by diabetic patients due to improvements in glucose/insulin balance.  They also slow how quickly the stomach empties so increase fullness. They may be hard to get covered for non diabetics and some plans have exclusions for weight loss purposes.  Currently, these are  injectable medications delivered via autoinjector pen. It can be very costly without insurance coverage (over $500/month).  Small risk for pancreatitis and dose should be held if increased mid  "abdominal pain/burning. It is not to be used if previous Multiple Endocrine Neoplasia. In rodents, may increase risk of thyroid tumors and not indicated for anyone with hx of medullary thyroid cancer as a result.  If changes in voice/swallowing should be discontinued. Reliable birth control required in women. [email or web address], [email or web address] has more information on these medications.    Contrave (Bupropion/Naltrexone).    Synergistic combination of a mild appetite suppressing anti-depressant (Bupropion) whose effects are increased due to interaction with Naltrexone.  Naltrexone may have some effects on craving and is often used in addiction medicine to help previous opiate addicts be less prone to relapse as it blocks the action of opiates. Should be stopped if any need for opiate pain medication, surgery or planned procedures where you'll be given sedation/anesthesia. If prolonged use recommend stepping down bupropion over 2-3 weeks to limit any risk of withdrawal issues. Side effects may include dry mouth, increased heart rate, mild elevation in Blood pressure;  dizziness, ringing in the ears, anxiety (typically due to bupropion), nausea, constipation, and some get fatigued with naltrexone.  About $210 on [facility] for 120 tabs of \"Contrave\", the brand name without insurance coverage. Generic Bupropion 75mg: $25 for 120 tabs, Naltrexone: $55 for 90 tabs without insurance coverage on [facility]. Cannot be used if pregnant/trying to conceive or breast feeding.      Plenity:   Recently available October 2020, by mail order pharmacy only, but expensive. $98/month.  2-3 capsules taken 20 minutes before 2 meals daily provides crystals that expand into a gel that provides a mechanical fullness. Gel mixes with your next meal and increases satisfaction by bulking the meal up to feel bigger than it truly is. Plenity is not absorbed and gets passed through the digestive tract and excreted in stools. May cause some bloating/gas/full " "feeling as it behaves like a fiber in many ways. Cost not available yet. FDA cleared in March of 2019 but not available in stores as of March of 2020. Clearance safety and efficacy data done under \"Gelesis\" name. Not appropriate for people with stomach or bowel motility issues as requires you to pass it through digestive tract. [email or web address] has more information.      Exercise Guidance    Nearly everything that bothers us gets better when the proper amount of exercise can be done in the proper amounts.  Getting to that level safely and without injury is the key.  When it comes to weight loss, exercise is especially important in maintaining the weight loss.  Unfortunately, one of the harsh realities is that substantial weight loss slows our metabolism, often anywhere from 5-20%.    Our brain always remembers our heaviest weight and we can return to that if we're not mindful and moving regularly.  Our biology doesn't understand the concept of having too much energy, only not having enough.  As such, when we lose weight, it's thought that the brain interprets this as we're ill or in a famine and dials back our metabolism to limit further weight loss.  This is why exercise is so important in keeping the weight off and is the main reason people have some weight regain from their low weight point after weight loss.  We have to make up that 10-20% of calories not being burned.Since we can restrict our intake for only so long, exercise becomes very important in our long term healthy weigh maintenance to balance out the occasional indiscretion with our diet.    Generally, for every 5% body weight reduction in a weight loss season, a person needs to add  kilocalories of exercise in their daily routine to keep that weight off for the long term.  This is why it's vital to be starting your fitness regimen during weight loss season, so that routine is well established as you move into your maintenance " period.    Additionally, all sorts of good enzymes and genes turn on with exercise and our stress, sleep, mood and bodies feel better when we can get to the point of making ourselves a little sweaty and short of breath 35-50 minutes most days of the week. But we have to start with what we can do first and give ourselves permission to work our way up to this goal.    Who isn't ready for exercise? Well, if you get severe dizziness/palpitations, chest pain or short of breath/faint with even minimal activity like walking across a room or you're having to pause while going up a flight of stairs, then getting your heart and/or lungs fully evaluated prior to starting an exercise regimen is recommended. Everyone else can probably start a program, but everyone may start at a different point:  Some can set a 5-10 minute walking goal and others will be able to ride their bike for an hour.      Start with where you're at and look to add 10% more each week until you're at that 150 minutes or more a week (or 75 minutes/week or more of vigorous exercise). Moderate exercise can be estimated as the pace you can carry on a conversation and vigorous is the pace at which you can get 3-5 words out before having to take a breath.  If you're using heart rate monitoring, Moderate is about 60% of your maximum heart rate and vigorous about 75%. (Max heart rate estimated as 220 beats minus your age:  Example: 220-age of 43 =176 Beats per minute (BPM) maximum. 0.6X 176= 105 BPM (moderate), 132 BMP(vigorous)).    If you like to count steps, the 10,000 steps per day does correlate well with weight maintenance but try to make at least 20-25% of those steps at a brisk pace (like you are about to miss your bus).    Finally, if you are pressed for time, it's important to know that some exercise is better than none.  High Intensity Interval training (HIIT) is a good way to get as much out of a short period of working out. If you can't walk, use the  "stairs, bike or swim; you could use a punching/arm workout regimen for your activity.  The idea with HIIT is to have a 3-6 minute warm up period of low intensity and the 3-6 \"intervals\" where you push the intensity up and then recover and start the next interval. One study showed that 3 intervals of 20 seconds at \"Maximum Effort\" while either biking on a stationary bike or going up stairs and then having 100 seconds recovery time before the next Maximum Effort was equally as beneficial on cardiovascular fitness development as doing 30 minutes of moderately paced walking 3 days weekly over a 6 week period of time.  So intensity matters. You just need to be able to safely do your desired exercise without injury. There are many great HIIT exercises/routines out there. IF you're not doing much exercise currently, I recommend giving your self 2-3 weeks of moderate exercise, 3 days weekly minimum to get your bones/tendons/muscles used to exercise before going for High Intensity workouts.    If you like to use Apps on the phone, the couch to 5k luz and 7 minute workout apps are nice places to start if you are reasonably healthy.  There are hundreds of other options out there.  Consider viewing [employer] if gentler exercise/movement is desired. Videos on Sekou Chi and chair yoga for seniors exist and are free. Check them out and let's get that 3-4 days a week routine going.    Let's move!  Dru Rodriguez MD.     High Intensity Interval Training (HIIT):    To feel our best, our bodies need to move. Our mental health, sleep, memory, immune function, stress coping and metabolic health depend on exercise and its benefits for optimizing how our body works best. In the modern world, most do not get nearly enough exercise.  However, it's important to understand that ANYTHING is better than nothing and if you can get started with a routine for fitness, even a little bit has some benefit compared to none.  The more you do, the better (up " "to 20 hours weekly, beyond that the benefit tails off), but the NIH guidelines for all adults in [place] is to work up to 150-200 minutes of moderate aerobic activity each week to improve our health.  If you're a person that struggles with time pressure/lack of interest then those 2.5-3 hours weekly may be too much to ask.  So, we have to be very efficient in how we exercise to reap the benefits. It turns out that INTENSITY matters. When we use Vigorous rather than Moderate aerobic activity (Vigorous defined by a heart rate of 70-85% of calculated maximum heart rate; max heart rate equals 220 beats minus your age or the level of effort where you could talk 2-4 words before needing to take a breath), the amount of time required to reap the benefits of exercise is cut in half:  75-90 minutes/week.      A recent study showed that a 10 minute interval workout using a 3-4 minute warm up and then 20 second \"maximum effort\" followed by 1 minute, 40 seconds of recovery and then repeated two more times was as effective in improving fitness as a 30 minute brisk walk.  They utilized exercise bikes or stairs for the effort but you could adapt to whatever geography/gym/pool/bike/hill/staircase that you may have as long as you can safely do the effort without injury.   As your fitness improves, intervals of 30 seconds to 2 minutes of increased effort followed by 1-2 minutes of recovery are options as well. The fitter you become, the harder and more intervals you'll be able to do.  Start with what you CAN do, not what you WANT to do and that will allow your body to adapt/develop fitness down the road to reach your goals.  Give yourself permission to develop a base of fitness the first 3 weeks and then you should be able to ramp up from there nicely and progressively each week.    Jumping right into a High Intensity Interval workout if you've not been having some level of exercise/fitness recently can increase your risk of injury.  " To help develop some base fitness here are some options that would give you a 3-4 week base development, assuming you can walk or ride a stationary bike but haven't been doing much the last few months. 3-4 sessions each week of:      Week Warm up Interval: 3-6 repeats Cooldown   1 3-5 minutes easy walk/spin 20 seconds brisk but doable pace then recover with 90 seconds easy 2-3 minute easy walk/spin   2 3-5 minutes easy walk/spin 25 seconds brisk, 90 seconds recovery 2-3 minute easy walk/spin   3 3-5 minutes easy walk/spin 30 seconds brisk, 90 seconds recovery 2-3 minute easy walk/spin   4 3-5 minutes easy walk/spin 40 seconds brisk, 60 seconds recovery 2-3 minute easy walk/spin     *for base development, keep resistance steady and just  the speed. Once you've completed base phase, feel free to add a little extra resistance to the faster phase to increase vigorousness/heart rate.  During base phase you should be still able to talk comfortable/sing during faster pedaling/walking.     After completing the base phase, start ramping up workouts on the bike/walking. Have one easy pace workout but of longer duration (add 2-3 minutes each week to the time) each week.  One workout weekly should have an interval workout where you push your intensity working up to those 15-30 second maximum efforts and recovering fully and then repeating for at least 3-4 intervals.  Cool down/easy pedal at the end for 1-2 minutes.     Consider a spin class if you have the means/access and remember, it's OK to rest if needed. Make the workout yours and don't focus on other people's abilities, getting started will develop your own fitness every week.  Jogging plans such as the Couch to 10k or any aerobic training program make use of similar intervals and can help you reach a fitter and more capable body regardless of where/how you perform the intervals (walking/biking/swimming/elliptical/row machine/erg arm machine, peddler, raking, lawn  mowing,etc).  Go for it.

## 2022-05-08 ENCOUNTER — HEALTH MAINTENANCE LETTER (OUTPATIENT)
Age: 27
End: 2022-05-08

## 2022-08-28 ENCOUNTER — HEALTH MAINTENANCE LETTER (OUTPATIENT)
Age: 27
End: 2022-08-28

## 2022-11-15 ENCOUNTER — MYC MEDICAL ADVICE (OUTPATIENT)
Dept: FAMILY MEDICINE | Facility: CLINIC | Age: 27
End: 2022-11-15

## 2022-11-16 NOTE — TELEPHONE ENCOUNTER
"Writer attempt to call pt back regarding MyChart message regarding \"Numbness and Tingling symptoms.\" No answer, left non-detailed VM with Matheus's call back number.    Writer replied to patient via Rezee message.    If pt calls back, needs to be triaged for symptoms. Thanks.    VIDAL ReinaN, RN   [facility]    "

## 2022-11-17 ENCOUNTER — NURSE TRIAGE (OUTPATIENT)
Dept: FAMILY MEDICINE | Facility: CLINIC | Age: 27
End: 2022-11-17

## 2022-11-17 NOTE — TELEPHONE ENCOUNTER
"Nurse Triage SBAR    Is this a 2nd Level Triage? NO    Situation:   Patient is concerned about the new onset weakness or numbness of his right arm/hand.    Background:   Symptoms all new.    Assessment:   Symptoms started about a couple weeks ago (Unable to recall exactly when). Patient unsure about the last time he had \"normal symptoms\" of his hand/arm. Numbness and tingling noticable with movement. Numbness and tingling is present. No chest pain, no difficulty breathing, no palpitations. Pt has not had any other neurological symptoms: No vision loss, no changes in speech, no headaches. No other symptoms per patient. His right knee does feel \"weak\" as well, but unsure if it's related.     Patient would like to be seen for his symptoms.    Protocol Recommended Disposition:   See in Office Today    Recommendation:   Care Advice given to Patient.  No [facility] available appointments today. Writer did look at closer clinic, ([facility]) no openings either. Patient would like to be seen. Advised pt that there are no openings at [facility] today. Urgent Care locations options given to pt. [facility] Pt will follow-up with Dr. Watt as scheduled.    Routing message to PCP.    Routed to provider    MARCO ANTONIO Reina, RN   [facility]    Does the patient meet one of the following criteria for ADS visit consideration? 16+ years old, with an [facility] PCP     TIP  Providers, please consider if this condition is appropriate for management at one of our Acute and Diagnostic Services sites.     If patient is a good candidate, please use dotphrase <dot>triageresponse and select Refer to ADS to document.    Reason for Disposition    Patient wants to be seen    Additional Information    Negative: Difficult to awaken or acting confused (e.g., disoriented, slurred speech)    Negative: New neurologic deficit that is present NOW, sudden onset of ANY of the " following: * Weakness of the face, arm, or leg on one side of the body* Numbness of the face, arm, or leg on one side of the body* Loss of speech or garbled speech    Negative: Sounds like a life-threatening emergency to the triager    Negative: Confusion, disorientation, or hallucinations is main symptom    Negative: Dizziness is main symptom    Negative: Followed a head injury within last 3 days    Negative: Headache (with neurologic deficit)    Negative: Unable to urinate (or only a few drops) and bladder feels very full    Negative: Loss of bladder or bowel control (urine or bowel incontinence; wetting self, leaking stool) of new-onset    Negative: Back pain with numbness (loss of sensation) in groin or rectal area    Negative: Neurologic deficit that was brief (now gone), ANY of the following: * Weakness of the face, arm, or leg on one side of the body * Numbness of the face, arm, or leg on one side of the body * Loss of speech or garbled speech    Negative: Patient sounds very sick or weak to the triager    Negative: Neurologic deficit of gradual onset (e.g., days to weeks), ANY of the following: * Weakness of the face, arm, or leg on one side of the body* Numbness of the face, arm, or leg on one side of the body* Loss of speech or garbled speech    Negative: [place] palsy suspected (i.e., weakness only one side of the face, developing over hours to days, no other symptoms)    Negative: Tingling (e.g., pins and needles) of the face, arm or leg on one side of the body, that is present now (Exceptions: Chronic or recurrent symptom lasting > 4 weeks; or tingling from known cause, such as: bumped elbow, carpal tunnel syndrome, pinched nerve, frostbite.)    Negative: Neck pain (with neurologic deficit)    Negative: Back pain (with neurologic deficit)    Protocols used: NEUROLOGIC DEFICIT-A-OH

## 2022-11-17 NOTE — TELEPHONE ENCOUNTER
Please see Nurse Triage encounter for more information.    Closing encounter.    VIDAL ReinaN, RN   [facility]

## 2023-01-14 ENCOUNTER — HEALTH MAINTENANCE LETTER (OUTPATIENT)
Age: 28
End: 2023-01-14

## 2023-01-18 ENCOUNTER — OFFICE VISIT (OUTPATIENT)
Dept: FAMILY MEDICINE | Facility: CLINIC | Age: 28
End: 2023-01-18
Payer: COMMERCIAL

## 2023-01-18 VITALS
OXYGEN SATURATION: 97 % | HEART RATE: 96 BPM | HEIGHT: 71 IN | WEIGHT: 308 LBS | SYSTOLIC BLOOD PRESSURE: 144 MMHG | BODY MASS INDEX: 43.12 KG/M2 | DIASTOLIC BLOOD PRESSURE: 96 MMHG | TEMPERATURE: 98 F

## 2023-01-18 DIAGNOSIS — Z11.3 SCREENING FOR STDS (SEXUALLY TRANSMITTED DISEASES): ICD-10-CM

## 2023-01-18 DIAGNOSIS — E11.9 TYPE 2 DIABETES MELLITUS WITHOUT COMPLICATION, WITHOUT LONG-TERM CURRENT USE OF INSULIN (H): ICD-10-CM

## 2023-01-18 DIAGNOSIS — E66.01 CLASS 3 SEVERE OBESITY WITH SERIOUS COMORBIDITY AND BODY MASS INDEX (BMI) OF 40.0 TO 44.9 IN ADULT, UNSPECIFIED OBESITY TYPE (H): ICD-10-CM

## 2023-01-18 DIAGNOSIS — I10 BENIGN ESSENTIAL HYPERTENSION: ICD-10-CM

## 2023-01-18 DIAGNOSIS — E66.813 CLASS 3 SEVERE OBESITY WITH SERIOUS COMORBIDITY AND BODY MASS INDEX (BMI) OF 40.0 TO 44.9 IN ADULT, UNSPECIFIED OBESITY TYPE (H): ICD-10-CM

## 2023-01-18 DIAGNOSIS — Z23 HIGH PRIORITY FOR 2019-NCOV VACCINE: ICD-10-CM

## 2023-01-18 DIAGNOSIS — Z79.899 ENCOUNTER FOR LONG-TERM (CURRENT) USE OF MEDICATIONS: ICD-10-CM

## 2023-01-18 DIAGNOSIS — Z00.00 ROUTINE GENERAL MEDICAL EXAMINATION AT A HEALTH CARE FACILITY: Primary | ICD-10-CM

## 2023-01-18 DIAGNOSIS — Z13.220 SCREENING FOR HYPERLIPIDEMIA: ICD-10-CM

## 2023-01-18 DIAGNOSIS — E11.65 TYPE 2 DIABETES MELLITUS WITH HYPERGLYCEMIA, WITHOUT LONG-TERM CURRENT USE OF INSULIN (H): ICD-10-CM

## 2023-01-18 DIAGNOSIS — Z00.00 LABORATORY EXAMINATION ORDERED AS PART OF A ROUTINE GENERAL MEDICAL EXAMINATION: ICD-10-CM

## 2023-01-18 PROBLEM — Z86.16 HISTORY OF 2019 NOVEL CORONAVIRUS DISEASE (COVID-19): Status: RESOLVED | Noted: 2020-09-18 | Resolved: 2023-01-18

## 2023-01-18 LAB
ANION GAP SERPL CALCULATED.3IONS-SCNC: 17 MMOL/L (ref 7–15)
BUN SERPL-MCNC: 12 MG/DL (ref 6–20)
CALCIUM SERPL-MCNC: 9.8 MG/DL (ref 8.6–10)
CHLORIDE SERPL-SCNC: 100 MMOL/L (ref 98–107)
CHOLEST SERPL-MCNC: 288 MG/DL
CREAT SERPL-MCNC: 0.77 MG/DL (ref 0.67–1.17)
CREAT UR-MCNC: 101 MG/DL
DEPRECATED HCO3 PLAS-SCNC: 20 MMOL/L (ref 22–29)
ERYTHROCYTE [DISTWIDTH] IN BLOOD BY AUTOMATED COUNT: 12 % (ref 10–15)
GFR SERPL CREATININE-BSD FRML MDRD: >90 ML/MIN/1.73M2
GLUCOSE SERPL-MCNC: 313 MG/DL (ref 70–99)
HBA1C MFR BLD: 10.7 % (ref 0–5.6)
HCT VFR BLD AUTO: 48.1 % (ref 40–53)
HDLC SERPL-MCNC: 29 MG/DL
HGB BLD-MCNC: 16.2 G/DL (ref 13.3–17.7)
LDLC SERPL CALC-MCNC: ABNORMAL MG/DL
LDLC SERPL DIRECT ASSAY-MCNC: 94 MG/DL
MCH RBC QN AUTO: 28.4 PG (ref 26.5–33)
MCHC RBC AUTO-ENTMCNC: 33.7 G/DL (ref 31.5–36.5)
MCV RBC AUTO: 84 FL (ref 78–100)
MICROALBUMIN UR-MCNC: <12 MG/L
MICROALBUMIN/CREAT UR: NORMAL MG/G{CREAT}
NONHDLC SERPL-MCNC: 259 MG/DL
PLATELET # BLD AUTO: 161 10E3/UL (ref 150–450)
POTASSIUM SERPL-SCNC: 4.3 MMOL/L (ref 3.4–5.3)
RBC # BLD AUTO: 5.71 10E6/UL (ref 4.4–5.9)
SODIUM SERPL-SCNC: 137 MMOL/L (ref 136–145)
TRIGL SERPL-MCNC: 879 MG/DL
TSH SERPL DL<=0.005 MIU/L-ACNC: 1.79 UIU/ML (ref 0.3–4.2)
WBC # BLD AUTO: 6.3 10E3/UL (ref 4–11)

## 2023-01-18 PROCEDURE — 99214 OFFICE O/P EST MOD 30 MIN: CPT | Mod: 25 | Performed by: FAMILY MEDICINE

## 2023-01-18 PROCEDURE — 83721 ASSAY OF BLOOD LIPOPROTEIN: CPT | Mod: 59 | Performed by: FAMILY MEDICINE

## 2023-01-18 PROCEDURE — 85027 COMPLETE CBC AUTOMATED: CPT | Performed by: FAMILY MEDICINE

## 2023-01-18 PROCEDURE — 36415 COLL VENOUS BLD VENIPUNCTURE: CPT | Performed by: FAMILY MEDICINE

## 2023-01-18 PROCEDURE — 83036 HEMOGLOBIN GLYCOSYLATED A1C: CPT | Performed by: FAMILY MEDICINE

## 2023-01-18 PROCEDURE — 82043 UR ALBUMIN QUANTITATIVE: CPT | Performed by: FAMILY MEDICINE

## 2023-01-18 PROCEDURE — 84443 ASSAY THYROID STIM HORMONE: CPT | Performed by: FAMILY MEDICINE

## 2023-01-18 PROCEDURE — 80048 BASIC METABOLIC PNL TOTAL CA: CPT | Performed by: FAMILY MEDICINE

## 2023-01-18 PROCEDURE — 87591 N.GONORRHOEAE DNA AMP PROB: CPT | Performed by: FAMILY MEDICINE

## 2023-01-18 PROCEDURE — 87491 CHLMYD TRACH DNA AMP PROBE: CPT | Performed by: FAMILY MEDICINE

## 2023-01-18 PROCEDURE — 80061 LIPID PANEL: CPT | Performed by: FAMILY MEDICINE

## 2023-01-18 PROCEDURE — 99395 PREV VISIT EST AGE 18-39: CPT | Performed by: FAMILY MEDICINE

## 2023-01-18 PROCEDURE — 82570 ASSAY OF URINE CREATININE: CPT | Performed by: FAMILY MEDICINE

## 2023-01-18 ASSESSMENT — ENCOUNTER SYMPTOMS
NAUSEA: 0
WEAKNESS: 0
NERVOUS/ANXIOUS: 0
HEMATURIA: 0
PALPITATIONS: 0
DYSURIA: 0
HEARTBURN: 0
JOINT SWELLING: 0
FREQUENCY: 0
EYE PAIN: 0
COUGH: 0
CONSTIPATION: 0
SHORTNESS OF BREATH: 0
CHILLS: 0
HEMATOCHEZIA: 0
FEVER: 0
HEADACHES: 0
DIARRHEA: 0
SORE THROAT: 0
DIZZINESS: 0
MYALGIAS: 0
ARTHRALGIAS: 0
ABDOMINAL PAIN: 0
PARESTHESIAS: 0

## 2023-01-18 NOTE — PROGRESS NOTES
SUBJECTIVE:   CC: Willy is an 27 year old who presents for preventative health visit.   Patient has been advised of split billing requirements and indicates understanding: Yes  Healthy Habits:     Getting at least 3 servings of Calcium per day:  Yes    Bi-annual eye exam:  Yes    Dental care twice a year:  NO    Sleep apnea or symptoms of sleep apnea:  Daytime drowsiness    Diet:  Breakfast skipped    Frequency of exercise:  2-3 days/week    Duration of exercise:  30-45 minutes    Taking medications regularly:  Yes    Medication side effects:  None    PHQ-2 Total Score: 0    Additional concerns today:  No    He had numbness on right side of body- this has completely resolved. No residual weakness or paresthesias. He reports one of his friends recently  of a stroke- he was young. Patient has new years resolution to start being more healthy. He has started trying to be more active and cut back on EtOH consumption. He plans to schedule a follow-up with weight management clinic.      Today's PHQ-2 Score:   PHQ-2 (  [employer]) 2023   Q1: Little interest or pleasure in doing things 0   Q2: Feeling down, depressed or hopeless 0   PHQ-2 Score 0   Q1: Little interest or pleasure in doing things Not at all   Q2: Feeling down, depressed or hopeless Not at all   PHQ-2 Score 0       Social History     Tobacco Use     Smoking status: Passive Smoke Exposure - Never Smoker     Smokeless tobacco: Never     Tobacco comments:     family smokes outside home   Substance Use Topics     Alcohol use: Yes     Comment: Alcoholic Drinks/day: weekends, 10 beers on average          Alcohol Use 2023   Prescreen: >3 drinks/day or >7 drinks/week? No   AUDIT SCORE  -     Reviewed orders with patient. Reviewed health maintenance and updated orders accordingly - Yes  Labs reviewed in EPIC    Reviewed and updated as needed this visit by clinical staff   Tobacco  Allergies  Meds              Reviewed and updated as needed this visit by  "Provider                     Review of Systems   Constitutional: Negative for chills and fever.   HENT: Negative for congestion, ear pain, hearing loss and sore throat.    Eyes: Negative for pain and visual disturbance.   Respiratory: Negative for cough and shortness of breath.    Cardiovascular: Negative for chest pain, palpitations and peripheral edema.   Gastrointestinal: Negative for abdominal pain, constipation, diarrhea, heartburn, hematochezia and nausea.   Genitourinary: Negative for dysuria, frequency, genital sores, hematuria, impotence, penile discharge and urgency.   Musculoskeletal: Negative for arthralgias, joint swelling and myalgias.   Skin: Negative for rash.   Neurological: Negative for dizziness, weakness, headaches and paresthesias.   Psychiatric/Behavioral: Negative for mood changes. The patient is not nervous/anxious.          OBJECTIVE:   BP (!) 144/96 (BP Location: Right arm, Patient Position: Sitting, Cuff Size: Adult Large)   Pulse 96   Temp 98  F (36.7  C) (Oral)   Ht 1.803 m (5' 11\")   Wt 139.7 kg (308 lb)   SpO2 97%   BMI 42.96 kg/m      Physical Exam  Constitutional: Appears well-developed and well-nourished. Active. No distress.   HENT:   Head: Atraumatic. No signs of injury.   Nose: Nose normal. No nasal discharge.   Mouth/Throat: Mucous membranes are moist. No tonsillar exudate. Oropharynx is clear. Pharynx is normal.   Eyes: Conjunctivae and EOM are normal. Pupils are equal, round, and reactive to light. Right eye exhibits no discharge. Left eye exhibits no discharge.   Neck: Normal range of motion. Neck supple. No adenopathy.   Cardiovascular: Normal rate, regular rhythm, S1 normal and S2 normal. No murmur heard  Pulmonary/Chest: Effort normal and breath sounds normal. No nasal flaring or stridor. No respiratory distress. No wheezes. No rhonchi. No rales. No retraction.   Musculoskeletal: Normal range of motion. No tenderness, deformity or signs of injury.   Neurological: " Alert. No focal deficits.  Skin: Skin is warm. No rash noted.       ASSESSMENT/PLAN:     Saravanan was seen today for physical and imm/inj.    Diagnoses and all orders for this visit:    Routine general medical examination at a health care facility    Type 2 diabetes mellitus without complication, without long-term current use of insulin (H): A1C not at goal. Reviewed plan to restart previous medications, metformin and GLP-1 agonist, when his sugars were well controlled. Encoruaged him to schedule follow-up appointment with weight management clinic. Placed eye exam referral for annual diabetic eye exam.   -     Lipid panel reflex to direct LDL Non-fasting; Future  -     Albumin Random Urine Quantitative with Creat Ratio; Future  -     Adult Eye  Referral; Future  -     HEMOGLOBIN A1C; Future  -     BASIC METABOLIC PANEL; Future  -     Lipid panel reflex to direct LDL Non-fasting  -     HEMOGLOBIN A1C  -     BASIC METABOLIC PANEL  -     Albumin Random Urine Quantitative with Creat Ratio  -     LDL cholesterol direct    Screening for STDs (sexually transmitted diseases): Asymptomatic screening.  -     NEISSERIA GONORRHOEA PCR; Future  -     CHLAMYDIA TRACHOMATIS PCR; Future  -     NEISSERIA GONORRHOEA PCR  -     CHLAMYDIA TRACHOMATIS PCR    Screening for hyperlipidemia  -     Lipid panel reflex to direct LDL Non-fasting; Future  -     Lipid panel reflex to direct LDL Non-fasting  -     LDL cholesterol direct    Benign essential hypertension: BP has consistently been borderline elevated. Reviewed options for treatment. He prefers to work on lifestyle modifications. Follow-up in a few months- if no improvement, then would encourage to start an antihypertensive medication.     Laboratory examination ordered as part of a routine general medical examination  -     TSH with free T4 reflex; Future  -     CBC with platelets; Future  -     TSH with free T4 reflex  -     CBC with platelets    Class 3 severe obesity with  "serious comorbidity and body mass index (BMI) of 40.0 to 44.9 in adult, unspecified obesity type (H): Encouraged him to call to schedule follow-up with weight management clinic. Refer for FERMIN evaluation.  -     Adult Sleep Eval & Management  Referral; Future        COUNSELING:   Reviewed preventive health counseling, as reflected in patient instructions      BMI:   Estimated body mass index is 45.05 kg/m  as calculated from the following:    Height as of 2/16/22: 1.803 m (5' 11\").    Weight as of 2/16/22: 146.5 kg (323 lb).   Weight management plan: Patient referred to endocrine and/or weight management specialty      He reports that he is a non-smoker but has been exposed to tobacco smoke. He has never used smokeless tobacco.            Kelly Watt MD  [facility]  "

## 2023-01-18 NOTE — LETTER
January 18, 2023      Saravanan Park  [address] [place] [address]        To Whom It May Concern:    Saravanan Park  was seen on 01/18/23.  Please excuse his absence.        Sincerely,        Kelly Watt MD

## 2023-01-19 LAB
C TRACH DNA SPEC QL NAA+PROBE: NEGATIVE
N GONORRHOEA DNA SPEC QL NAA+PROBE: NEGATIVE

## 2023-01-20 DIAGNOSIS — E11.65 TYPE 2 DIABETES MELLITUS WITH HYPERGLYCEMIA, WITHOUT LONG-TERM CURRENT USE OF INSULIN (H): ICD-10-CM

## 2023-01-20 RX ORDER — METFORMIN HCL 500 MG
TABLET, EXTENDED RELEASE 24 HR ORAL
Qty: 120 TABLET | Refills: 11 | Status: SHIPPED | OUTPATIENT
Start: 2023-01-20

## 2023-04-23 ENCOUNTER — HEALTH MAINTENANCE LETTER (OUTPATIENT)
Age: 28
End: 2023-04-23

## 2023-08-08 ENCOUNTER — VIRTUAL VISIT (OUTPATIENT)
Dept: FAMILY MEDICINE | Facility: CLINIC | Age: 28
End: 2023-08-08
Payer: COMMERCIAL

## 2023-08-08 DIAGNOSIS — G56.32 RADIAL NERVE PALSY, LEFT: Primary | ICD-10-CM

## 2023-08-08 PROCEDURE — 99213 OFFICE O/P EST LOW 20 MIN: CPT | Mod: VID | Performed by: FAMILY MEDICINE

## 2023-08-08 NOTE — PROGRESS NOTES
Telehealth Visit      Provider discussed the limitations of the telehealth visit and patient would like to proceed with the encounter.  Both patient and provider agree that a telehealth visit would be appropriate to address patient's concerns today.    Location of patient: [place]  Location of provider: [place]    Time at start of telehealth visit: 5:39 PM  Time at start of telehealth visit: 5:50 PM  Time spent in direct cares for telehealth visit: 11 minutes        Assessment/Plan:     Patient Instructions:    -It is good to hear that the nerve function is slowly improving.  As discussed, this may take months (6-12 months) for us to know where your full recovery will be.  You could potentially not have full return of nerve function to normal level.  -Please connect with physical therapy to schedule appointment to complete therapy sessions for recovery of the nerve.  -Please see work note written for you and the letters section on MyChart.    Please seek immediate medical attention (go to the emergency room or urgent care) for the following reasons: worsening symptoms, or any concerning changes.    Please return to clinic in 1 months for follow up, or sooner as needed.      Saravanan was seen today for recheck.  Diagnoses and all orders for this visit:    Radial nerve palsy, left: Improvement noted in motor functions, though still has a long ways to go.  Sensory has not improved yet.  Patient will contact the physical therapy team at the number that was given to him from the urgency room visit.  Reviewed anticipated course of recovery.  Plan for follow-up in a month with PCP.      The diagnoses, treatment options, risk, benefits, and recommendations were reviewed with patient/guardian.  Questions were answered to patient's/guardian satisfaction.  Red flag signs were reviewed.  Patient/guardian is in agreement with above plan.      Subjective: 28 year old male with history of diabetes mellitus type 2, morbid  obesity, hyperlipidemia, elevated blood pressure without diagnosis of hypertension, vitamin D deficiency, who presents to clinic for the following complaints:   Patient presents with:  RECHECK    Patient was seen in the urgency room on 08/01/2023 for evaluation of left arm numbness.  Patient had reported that he woke up from a nap with his left arm numb 3 nights prior after drinking alcohol at a wedding.  He does not know if he slept weird on the arm.  He endorsed left arm weakness.  The patient stated that he was starting to be able to move the left arm.  He denied any headache, vision changes, or difficulty with speech or swallowing.  Patient was diagnosed with a radial nerve palsy and was referred to physical therapy.    Patient reports that he couldn't even move his arm up or down when it happened. The arm was weak and he had trouble even moving it when he went to the UR.     Since then, the left arm has been improved.    From the thumb down to the biceps, the skin is still numb. The thumb is the most numb and the other fingers are good. He can't lift up anything heavy. Even holding a cup of water causes his arm to shake.     Patient did not hear from the physical therapy team yet and he hasn't called them yet, either. There is a contact number on the UR after visit summary that patient has, which she was able to find.  He will contact physical therapy to schedule session.    Patient denies any new weakness, numbness, tingling.  Arms and legs are otherwise working normally.  Denies any severe headaches, fevers, chills, nausea, vomiting, tripping when walking, or other changes from baseline.    He works in medical assembly and wear house production.   Work note: plan to return tomorrow (8/9/2023) and work the following restrictions: Avoid/limit use of the left arm.   Patient needs a work note for missed work from 8/3-8/8.  He got a note from the urgency room doctor for the other days that he had  missed.      Patient reports that he is not taking the Ozempic.  He is only taking metformin.    The 10 point review of system is negative except as stated in the HPI.    Allergies were reviewed and updated.    Objective:   There were no vitals taken for this visit.  General: Active, alert, nontoxic-appearing.  No acute distress.  HEENT: Normocephalic, atraumatic.  Pupils are equal and round.  Sclera is clear.  Normal external ears. Nares patent.  Moist mucous membranes.    Cardiac: Normal skin tone.  Respiratory/chest: Breathing is not labored.  No accessory muscle usage.  Extremities: Voluntary movements intact.  Movement of the left upper extremity appears to be within normal limits.  Unable to test strength.  Patient reporting reduced sensation on the radial side of the forearm, thumb, and distal bicep region.  Integumentary: No concerning rash or skin changes appreciated.      Armin Gruber MD  [facility] [place] [address]  Phone: [phone]  Fax: [phone]    8/11/2023  8:18 AM            Current Outpatient Medications   Medication    metFORMIN (GLUCOPHAGE XR) 500 MG 24 hr tablet     No current facility-administered medications for this visit.       No Known Allergies    Patient Active Problem List    Diagnosis Date Noted    Vitamin D deficiency 10/07/2021     Priority: Medium     Formatting of this note might be different from the original.  Created by Conversion    Replacement Utility updated for latest IMO load      Hyperlipidemia 10/07/2021     Priority: Medium     Formatting of this note might be different from the original.  Created by Conversion      Elevated BP without diagnosis of hypertension 07/07/2021     Priority: Medium    Perianal abscess 10/29/2020     Priority: Medium     Formatting of this note might be different from the original.  Added automatically from request for surgery [id number]      Morbid obesity (H) 06/21/2018     Priority: Medium    Type 2  diabetes mellitus without complication, without long-term current use of insulin (H)      Priority: Medium     Created by [facility]  [employer] [facility] Annotation: Mar  3 2010 12:45PM - Paul Cid: per   mother's   report at 3/3/10 appt. Being seen at [facility] peds endo           Family History   Problem Relation Age of Onset    Diabetes Mother     Hypertension Father     Diabetes Brother        Past Surgical History:   Procedure Laterality Date    EXAM UNDER ANESTHESIA RECTUM N/A 10/7/2021    Procedure: EXAM UNDER ANESTHESIA;  Surgeon: Antione Kwok MD;  Location: [facility] KNEE SCOPE, DIAGNOSTIC      Description: Arthroscopy Knee Right;  Proc Date: 09/23/2010;  Comments: football injury on 9/7/10    OTHER SURGICAL HISTORY  2015    right hand surgery    PLACEMENT OF SETON RECTUM N/A 10/7/2021    Procedure: AND SETON CHANGE;  Surgeon: Antione Kwok MD;  Location: [facility] [place]    CT PLACEMENT,SETON N/A 1/27/2021    Procedure: EXAM UNDER ANESTHESIA WITH PARTIAL  FISUTLOTOMY, REVISION SETON, AND PLACEMENT OF SETON, EXCISION THIGH SKIN TAGS;  Surgeon: Antione Kwok MD;  Location: [facility];  Service: General    REMOVE FISTULA ANAL N/A 10/7/2021    Procedure: PARTIAL FISTULOTOMY;  Surgeon: Antione Kwok MD;  Location: [facility] [place]        Social History     Socioeconomic History    Marital status: Single     Spouse name: Not on file    Number of children: Not on file    Years of education: Not on file    Highest education level: Not on file   Occupational History    Not on file   Tobacco Use    Smoking status: Never     Passive exposure: Yes    Smokeless tobacco: Never    Tobacco comments:     family smokes outside home   Vaping Use    Vaping Use: Some days   Substance and Sexual Activity    Alcohol use: Yes     Comment: Alcoholic Drinks/day: weekends, 10 beers on average     Drug use: Never    Sexual activity: Yes     Partners: Female     Birth control/protection: Condom    Other Topics Concern    Parent/sibling w/ CABG, MI or angioplasty before 65F 55M? 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     Social Determinants of Health     Financial Resource Strain: Not on file   Food Insecurity: Not on file   Transportation Needs: Not on file   Physical Activity: Not on file   Stress: Not on file   Social Connections: Not on file   Intimate Partner Violence: Not on file   Housing Stability: Not on file

## 2023-08-08 NOTE — LETTER
August 8, 2023      Saravanan Park  [address] [place] [address]        To Whom It May Concern:    Saravanan Park was seen in our clinic. He may return to work on 08/09/2023 with the following: Limit/avoid use of the left arm.  This restriction should be an effect through 09/09/2023 or unless cleared on evaluation by a doctor.    Please excuse patient from any missed work from 08/03/2023 - 08/08/2023 due to medical reasons.      Sincerely,          Armin Gruber MD  [facility] [place] [address]  Phone: [phone]  Fax: [phone]    8/8/2023  5:52 PM

## 2023-08-11 PROBLEM — G56.32 RADIAL NERVE PALSY, LEFT: Status: ACTIVE | Noted: 2023-08-11

## 2023-08-11 NOTE — PATIENT INSTRUCTIONS
-Thank you for choosing the [facility].  -It was a pleasure to see you today.  -Please take a look at the information below for more specific details regarding the treatment plan and recommendations.  -In this after visit summary is a list of your medications and specific instructions.  Please review this carefully as there may be changes made to your medication list.  -If there are any particular questions or concerns, please feel free to reach out to Dr. Gruber.  -If any labs have been completed, we will reach out to you about results.  If the results are normal or not concerning, a letter or [employer] message will be sent to you.  If any follow-up is needed, either Dr. Gruber or the nurse will give you a call.  If you have not heard regarding results after 2 weeks, please reach out to the clinic.    Patient Instructions:    -It is good to hear that the nerve function is slowly improving.  As discussed, this may take months (6-12 months) for us to know where your full recovery will be.  You could potentially not have full return of nerve function to normal level.  -Please connect with physical therapy to schedule appointment to complete therapy sessions for recovery of the nerve.  -Please see work note written for you and the letters section on [employer].    Please seek immediate medical attention (go to the emergency room or urgent care) for the following reasons: worsening symptoms, or any concerning changes.    Please return to clinic in 1 months for follow up, or sooner as needed.      --------------------------------------------------------------------------------------------------------------------    -We are always looking for ways to improve.  You may be selected to receive a survey regarding your visit today.  We encourage you to complete the survey and provide specific, constructive feedback to help us improve our processes.  Thank you for your time!  -Please review the contact information  listed on the after visit summary and in the electronic chart.  Below is the phone number that we have on file.  If there are any changes that are needed to be made, please reach out to the clinic.  [phone] (home)

## 2023-09-24 ENCOUNTER — HEALTH MAINTENANCE LETTER (OUTPATIENT)
Age: 28
End: 2023-09-24

## 2024-01-04 ENCOUNTER — PATIENT OUTREACH (OUTPATIENT)
Dept: CARE COORDINATION | Facility: CLINIC | Age: 29
End: 2024-01-04
Payer: COMMERCIAL

## 2024-01-18 ENCOUNTER — PATIENT OUTREACH (OUTPATIENT)
Dept: CARE COORDINATION | Facility: CLINIC | Age: 29
End: 2024-01-18
Payer: COMMERCIAL

## 2024-02-11 ENCOUNTER — HEALTH MAINTENANCE LETTER (OUTPATIENT)
Age: 29
End: 2024-02-11

## 2024-04-21 ENCOUNTER — HEALTH MAINTENANCE LETTER (OUTPATIENT)
Age: 29
End: 2024-04-21

## 2024-06-04 ENCOUNTER — TELEPHONE (OUTPATIENT)
Dept: FAMILY MEDICINE | Facility: CLINIC | Age: 29
End: 2024-06-04
Payer: COMMERCIAL

## 2024-06-04 NOTE — TELEPHONE ENCOUNTER
Patient Quality Outreach    Patient is due for the following:   Diabetes -  A1C, Eye Exam, Microalbumin, and Diabetic Follow-Up Visit    Next Steps:   Schedule a office visit for Diabetes follow up    Type of outreach:    Phone, left message for patient/parent to call back.    Next Steps:  Reach out within 90 days via Phone.    Max number of attempts reached: No. Will try again in 90 days if patient still on fail list.    Questions for provider review:    None           Boris Rivera  Chart routed to Care Team.

## 2024-06-30 ENCOUNTER — HEALTH MAINTENANCE LETTER (OUTPATIENT)
Age: 29
End: 2024-06-30

## 2024-07-16 ENCOUNTER — PATIENT OUTREACH (OUTPATIENT)
Dept: CARE COORDINATION | Facility: CLINIC | Age: 29
End: 2024-07-16
Payer: COMMERCIAL

## 2024-11-17 ENCOUNTER — HEALTH MAINTENANCE LETTER (OUTPATIENT)
Age: 29
End: 2024-11-17

## 2025-03-08 ENCOUNTER — HEALTH MAINTENANCE LETTER (OUTPATIENT)
Age: 30
End: 2025-03-08

## 2025-05-11 ENCOUNTER — HEALTH MAINTENANCE LETTER (OUTPATIENT)
Age: 30
End: 2025-05-11

## 2025-06-22 ENCOUNTER — HEALTH MAINTENANCE LETTER (OUTPATIENT)
Age: 30
End: 2025-06-22

## (undated) DEVICE — TRAY PREP DRY SKIN SCRUB 067

## (undated) DEVICE — BULB W/BLADDER TUBING STRL DISP

## (undated) DEVICE — SOL ADH LIQUID BENZOIN SWAB 0.6ML C1544

## (undated) DEVICE — DRSG KERLIX FLUFFS X5

## (undated) DEVICE — NEEDLE HYPO 22X1-1/2 SAFETY 305900

## (undated) DEVICE — DRAPE OR LAPAROTOMY KC 89228*

## (undated) DEVICE — Device

## (undated) DEVICE — SOL WATER IRRIG 1000ML BOTTLE 2F7114

## (undated) DEVICE — PLATE GROUNDING ADULT W/CORD 9165L

## (undated) DEVICE — GLOVE BIOGEL PI ULTRATOUCH G SZ 6.5 42165

## (undated) DEVICE — SU CHROMIC 3-0 SH 27" G122H

## (undated) DEVICE — TAPE ADH POROUS 3IN CURITY STD 7046C

## (undated) DEVICE — SYR BULB IRRIG 50ML LATEX FREE 0035280

## (undated) DEVICE — ESU LIGASURE ATLAS 20CM  LS1020

## (undated) DEVICE — CUSTOM PACK GEN MAJOR SBA5BGMHEA

## (undated) DEVICE — GOWN IMPERVIOUS BREATHABLE SMART LG 89015

## (undated) DEVICE — GLOVE SURG PI ULTRA TOUCH M SZ 7-1/2 LF

## (undated) DEVICE — SYR 10ML LL W/O NDL 302995

## (undated) DEVICE — TAPE ADH MEDIPORE 2IN HI SOFT CLOTH 2862

## (undated) DEVICE — BRIEF STRETCH XL MPS40

## (undated) DEVICE — DECANTER VIAL 2006S

## (undated) DEVICE — PREP POVIDONE IODINE SOLUTION 10% 4OZ BOTTLE 29906-004

## (undated) DEVICE — ESU PENCIL SMOKE EVAC W/ROCKER SWITCH 0703-047-000

## (undated) DEVICE — VESSEL LOOPS BLUE SUPERMAXI 011022PBX

## (undated) DEVICE — LUBRICANT SURG 2 OZ STRL FLIP TOP 2OZLUB

## (undated) DEVICE — SUCTION MANIFOLD NEPTUNE 2 SYS 1 PORT 702-025-000

## (undated) DEVICE — GLOVE BIOGEL PI INDICATOR 8.0 LF 41680

## (undated) DEVICE — NEEDLE HYPO 21 X 1 SAFETY 305915

## (undated) DEVICE — GLOVE UNDER INDICATOR PI SZ 7.0 LF 41670

## (undated) RX ORDER — BUPIVACAINE HYDROCHLORIDE 2.5 MG/ML
INJECTION, SOLUTION EPIDURAL; INFILTRATION; INTRACAUDAL
Status: DISPENSED
Start: 2021-10-07